# Patient Record
Sex: FEMALE | URBAN - METROPOLITAN AREA
[De-identification: names, ages, dates, MRNs, and addresses within clinical notes are randomized per-mention and may not be internally consistent; named-entity substitution may affect disease eponyms.]

---

## 2021-10-15 ENCOUNTER — HISTORICAL (OUTPATIENT)
Dept: ANESTHESIOLOGY | Facility: HOSPITAL | Age: 61
End: 2021-10-15

## 2021-10-15 LAB
ABS NEUT (OLG): 11.2 X10(3)/MCL (ref 1.5–6.9)
APTT PPP: 23.9 SEC (ref 23.4–34.9)
BASOPHILS # BLD AUTO: 0 X10(3)/MCL (ref 0–0.1)
BASOPHILS NFR BLD AUTO: 0 % (ref 0–1)
BUN SERPL-MCNC: 9 MG/DL (ref 9.8–20.1)
CALCIUM SERPL-MCNC: 9.5 MG/DL (ref 8.4–10.2)
CHLORIDE SERPL-SCNC: 103 MMOL/L (ref 98–107)
CO2 SERPL-SCNC: 26 MMOL/L (ref 23–31)
CREAT SERPL-MCNC: 0.83 MG/DL (ref 0.55–1.02)
CREAT/UREA NIT SERPL: 11
EOSINOPHIL # BLD AUTO: 0 X10(3)/MCL (ref 0–0.6)
EOSINOPHIL NFR BLD AUTO: 0 % (ref 0–5)
ERYTHROCYTE [DISTWIDTH] IN BLOOD BY AUTOMATED COUNT: 12.8 % (ref 11.5–17)
GLUCOSE SERPL-MCNC: 94 MG/DL (ref 82–115)
HCT VFR BLD AUTO: 41.2 % (ref 36–48)
HGB BLD-MCNC: 13.1 GM/DL (ref 12–16)
IMM GRANULOCYTES # BLD AUTO: 0.05 10*3/UL (ref 0–0.02)
IMM GRANULOCYTES NFR BLD AUTO: 0.4 % (ref 0–0.43)
INR PPP: 1 (ref 2–3)
LYMPHOCYTES # BLD AUTO: 0.5 X10(3)/MCL (ref 0.5–4.1)
LYMPHOCYTES NFR BLD AUTO: 4 % (ref 15–40)
MCH RBC QN AUTO: 29 PG (ref 27–34)
MCHC RBC AUTO-ENTMCNC: 32 GM/DL (ref 31–36)
MCV RBC AUTO: 92 FL (ref 80–99)
MONOCYTES # BLD AUTO: 0.6 X10(3)/MCL (ref 0–1.1)
MONOCYTES NFR BLD AUTO: 5 % (ref 4–12)
NEUTROPHILS # BLD AUTO: 11.2 X10(3)/MCL (ref 1.5–6.9)
NEUTROPHILS NFR BLD AUTO: 90 % (ref 43–75)
PLATELET # BLD AUTO: 246 X10(3)/MCL (ref 140–400)
PMV BLD AUTO: 11 FL (ref 6.8–10)
POTASSIUM SERPL-SCNC: 3.3 MMOL/L (ref 3.5–5.1)
PROTHROMBIN TIME: 13 SEC (ref 11.7–14.5)
RBC # BLD AUTO: 4.48 X10(6)/MCL (ref 4.2–5.4)
SARS-COV-2 AG RESP QL IA.RAPID: NOT DETECTED
SODIUM SERPL-SCNC: 140 MMOL/L (ref 136–145)
WBC # SPEC AUTO: 12.4 X10(3)/MCL (ref 4.5–11.5)

## 2022-04-30 NOTE — OP NOTE
PREOPERATIVE DIAGNOSIS:  Right Colles fracture.    POSTOPERATIVE DIAGNOSIS:  Right Colles fracture.    PROCEDURE:  Open reduction, internal fixation, right distal radius fracture.    IMPLANT TYPE:  Synthes volar plate.    INDICATIONS:  The patient is a 61-year-old female who had a severely displaced radius fracture, had numbness with tension on the median nerve.  She was consented and brought to surgery.  I saw her late yesterday afternoon.    DESCRIPTION OF PROCEDURE:  She was brought to the OR.  She was extremely anxious.  She had some tachycardia, blood pressure was a little high, so Anesthesia carefully brought that down.  A 12-lead EKG which Dr. Samaniego reviewed did not feel like it revealed any cardiac changes.  We gave her a block for safety versus general anesthesia.  Tourniquet was inflated.  Volar incision was made. The FCR tendon was retracted.  The flexor pollicis muscle belly was peeled off the pronator.  The pronator was taken down.  The fracture was reduced and secured with a volar plate with a compression screw and a locking screw in the shaft, and then distal locking __________.  The reduction was anatomic.  The wound was irrigated.  Permanents were taken.  The pronator was quite deficient, but I closed what I could.  Subcu was closed, subcuticular, and Exofin glue on the skin. Sterile dressing, volar splint.  No complications.        DILSHAD/WESLEY   DD: 10/15/2021 1424   DT: 10/15/2021 1435  Job # [id number]

## 2025-02-26 ENCOUNTER — HOSPITAL ENCOUNTER (INPATIENT)
Facility: HOSPITAL | Age: 65
LOS: 2 days | Discharge: HOME | DRG: 321 | End: 2025-02-28
Attending: STUDENT IN AN ORGANIZED HEALTH CARE EDUCATION/TRAINING PROGRAM | Admitting: STUDENT IN AN ORGANIZED HEALTH CARE EDUCATION/TRAINING PROGRAM
Payer: MEDICARE

## 2025-02-26 ENCOUNTER — HOSPITAL ENCOUNTER (OUTPATIENT)
Dept: RADIOLOGY | Facility: HOSPITAL | Age: 65
Discharge: HOME | End: 2025-02-26
Attending: FAMILY MEDICINE
Payer: MEDICARE

## 2025-02-26 ENCOUNTER — HOSPITAL ENCOUNTER (EMERGENCY)
Facility: HOSPITAL | Age: 65
Discharge: ANOTHER HOSPITAL | End: 2025-02-26
Attending: INTERNAL MEDICINE
Payer: MEDICARE

## 2025-02-26 VITALS
RESPIRATION RATE: 20 BRPM | TEMPERATURE: 99 F | OXYGEN SATURATION: 99 % | BODY MASS INDEX: 20.91 KG/M2 | HEIGHT: 63 IN | HEART RATE: 95 BPM | SYSTOLIC BLOOD PRESSURE: 138 MMHG | WEIGHT: 118 LBS | DIASTOLIC BLOOD PRESSURE: 99 MMHG

## 2025-02-26 DIAGNOSIS — R10.13 EPIGASTRIC PAIN: ICD-10-CM

## 2025-02-26 DIAGNOSIS — R07.9 CHEST PAIN: ICD-10-CM

## 2025-02-26 DIAGNOSIS — I25.10 CAD (CORONARY ARTERY DISEASE): ICD-10-CM

## 2025-02-26 DIAGNOSIS — R10.13 EPIGASTRIC ABDOMINAL PAIN: Primary | ICD-10-CM

## 2025-02-26 DIAGNOSIS — Z98.61 CAD S/P PERCUTANEOUS CORONARY ANGIOPLASTY: ICD-10-CM

## 2025-02-26 DIAGNOSIS — I21.4 NSTEMI (NON-ST ELEVATED MYOCARDIAL INFARCTION): ICD-10-CM

## 2025-02-26 DIAGNOSIS — R10.0 ACUTE ABDOMEN: ICD-10-CM

## 2025-02-26 DIAGNOSIS — I25.10 CAD S/P PERCUTANEOUS CORONARY ANGIOPLASTY: ICD-10-CM

## 2025-02-26 LAB
ALBUMIN SERPL-MCNC: 3.5 G/DL (ref 3.4–4.8)
ALBUMIN SERPL-MCNC: 4.7 G/DL (ref 3.4–4.8)
ALBUMIN/GLOB SERPL: 1.3 RATIO (ref 1.1–2)
ALBUMIN/GLOB SERPL: 1.6 RATIO (ref 1.1–2)
ALP SERPL-CCNC: 49 UNIT/L (ref 40–150)
ALP SERPL-CCNC: 60 UNIT/L (ref 40–150)
ALT SERPL-CCNC: 10 UNIT/L (ref 0–55)
ALT SERPL-CCNC: 12 UNIT/L (ref 0–55)
ANION GAP SERPL CALC-SCNC: 12 MEQ/L
ANION GAP SERPL CALC-SCNC: 9 MEQ/L
AST SERPL-CCNC: 43 UNIT/L (ref 5–34)
AST SERPL-CCNC: 56 UNIT/L (ref 5–34)
BACTERIA #/AREA URNS AUTO: NORMAL /HPF
BASOPHILS # BLD AUTO: 0.02 X10(3)/MCL
BASOPHILS # BLD AUTO: 0.02 X10(3)/MCL
BASOPHILS NFR BLD AUTO: 0.1 %
BASOPHILS NFR BLD AUTO: 0.2 %
BILIRUB SERPL-MCNC: 0.6 MG/DL
BILIRUB SERPL-MCNC: 0.7 MG/DL
BILIRUB UR QL STRIP.AUTO: NEGATIVE
BNP BLD-MCNC: 278.7 PG/ML
BUN SERPL-MCNC: 11.5 MG/DL (ref 9.8–20.1)
BUN SERPL-MCNC: 14 MG/DL (ref 9.8–20.1)
CALCIUM SERPL-MCNC: 10.5 MG/DL (ref 8.4–10.2)
CALCIUM SERPL-MCNC: 8.9 MG/DL (ref 8.4–10.2)
CHLORIDE SERPL-SCNC: 104 MMOL/L (ref 98–107)
CHLORIDE SERPL-SCNC: 105 MMOL/L (ref 98–107)
CLARITY UR: CLEAR
CO2 SERPL-SCNC: 22 MMOL/L (ref 23–31)
CO2 SERPL-SCNC: 23 MMOL/L (ref 23–31)
COLOR UR AUTO: YELLOW
CREAT SERPL-MCNC: 0.84 MG/DL (ref 0.55–1.02)
CREAT SERPL-MCNC: 0.89 MG/DL (ref 0.55–1.02)
CREAT/UREA NIT SERPL: 14
CREAT/UREA NIT SERPL: 16
EOSINOPHIL # BLD AUTO: 0 X10(3)/MCL (ref 0–0.9)
EOSINOPHIL # BLD AUTO: 0.01 X10(3)/MCL (ref 0–0.9)
EOSINOPHIL NFR BLD AUTO: 0 %
EOSINOPHIL NFR BLD AUTO: 0.1 %
ERYTHROCYTE [DISTWIDTH] IN BLOOD BY AUTOMATED COUNT: 13.3 % (ref 11.5–17)
ERYTHROCYTE [DISTWIDTH] IN BLOOD BY AUTOMATED COUNT: 13.3 % (ref 11.5–17)
GFR SERPLBLD CREATININE-BSD FMLA CKD-EPI: >60 ML/MIN/1.73/M2
GFR SERPLBLD CREATININE-BSD FMLA CKD-EPI: >60 ML/MIN/1.73/M2
GLOBULIN SER-MCNC: 2.2 GM/DL (ref 2.4–3.5)
GLOBULIN SER-MCNC: 3.5 GM/DL (ref 2.4–3.5)
GLUCOSE SERPL-MCNC: 104 MG/DL (ref 82–115)
GLUCOSE SERPL-MCNC: 122 MG/DL (ref 82–115)
GLUCOSE UR QL STRIP: NEGATIVE
HCT VFR BLD AUTO: 38.2 % (ref 37–47)
HCT VFR BLD AUTO: 46.6 % (ref 37–47)
HGB BLD-MCNC: 12.5 G/DL (ref 12–16)
HGB BLD-MCNC: 15.4 G/DL (ref 12–16)
HGB UR QL STRIP: ABNORMAL
IMM GRANULOCYTES # BLD AUTO: 0.04 X10(3)/MCL (ref 0–0.04)
IMM GRANULOCYTES # BLD AUTO: 0.06 X10(3)/MCL (ref 0–0.04)
IMM GRANULOCYTES NFR BLD AUTO: 0.4 %
IMM GRANULOCYTES NFR BLD AUTO: 0.4 %
KETONES UR QL STRIP: ABNORMAL
LEUKOCYTE ESTERASE UR QL STRIP: NEGATIVE
LIPASE SERPL-CCNC: 29 U/L
LYMPHOCYTES # BLD AUTO: 0.74 X10(3)/MCL (ref 0.6–4.6)
LYMPHOCYTES # BLD AUTO: 0.95 X10(3)/MCL (ref 0.6–4.6)
LYMPHOCYTES NFR BLD AUTO: 4.8 %
LYMPHOCYTES NFR BLD AUTO: 9.3 %
MCH RBC QN AUTO: 29.2 PG (ref 27–31)
MCH RBC QN AUTO: 29.6 PG (ref 27–31)
MCHC RBC AUTO-ENTMCNC: 32.7 G/DL (ref 33–36)
MCHC RBC AUTO-ENTMCNC: 33 G/DL (ref 33–36)
MCV RBC AUTO: 88.3 FL (ref 80–94)
MCV RBC AUTO: 90.3 FL (ref 80–94)
MONOCYTES # BLD AUTO: 0.57 X10(3)/MCL (ref 0.1–1.3)
MONOCYTES # BLD AUTO: 0.73 X10(3)/MCL (ref 0.1–1.3)
MONOCYTES NFR BLD AUTO: 4.7 %
MONOCYTES NFR BLD AUTO: 5.6 %
NEUTROPHILS # BLD AUTO: 13.94 X10(3)/MCL (ref 2.1–9.2)
NEUTROPHILS # BLD AUTO: 8.6 X10(3)/MCL (ref 2.1–9.2)
NEUTROPHILS NFR BLD AUTO: 84.4 %
NEUTROPHILS NFR BLD AUTO: 90 %
NITRITE UR QL STRIP: NEGATIVE
NRBC BLD AUTO-RTO: 0 %
NRBC BLD AUTO-RTO: 0 %
OHS QRS DURATION: 74 MS
OHS QTC CALCULATION: 387 MS
PH UR STRIP: 5 [PH]
PLATELET # BLD AUTO: 269 X10(3)/MCL (ref 130–400)
PLATELET # BLD AUTO: 326 X10(3)/MCL (ref 130–400)
PMV BLD AUTO: 11.3 FL (ref 7.4–10.4)
PMV BLD AUTO: 11.5 FL (ref 7.4–10.4)
POTASSIUM SERPL-SCNC: 3.4 MMOL/L (ref 3.5–5.1)
POTASSIUM SERPL-SCNC: 4 MMOL/L (ref 3.5–5.1)
PROT SERPL-MCNC: 5.7 GM/DL (ref 5.8–7.6)
PROT SERPL-MCNC: 8.2 GM/DL (ref 5.8–7.6)
PROT UR QL STRIP: NEGATIVE
RBC # BLD AUTO: 4.23 X10(6)/MCL (ref 4.2–5.4)
RBC # BLD AUTO: 5.28 X10(6)/MCL (ref 4.2–5.4)
RBC #/AREA URNS AUTO: NORMAL /HPF
SODIUM SERPL-SCNC: 137 MMOL/L (ref 136–145)
SODIUM SERPL-SCNC: 138 MMOL/L (ref 136–145)
SP GR UR STRIP.AUTO: <=1.005 (ref 1–1.03)
SQUAMOUS #/AREA URNS AUTO: NORMAL /HPF
TROPONIN I SERPL-MCNC: 8.37 NG/ML (ref 0–0.04)
TROPONIN I SERPL-MCNC: 9.41 NG/ML (ref 0–0.04)
UROBILINOGEN UR STRIP-ACNC: 0.2
WBC # BLD AUTO: 10.19 X10(3)/MCL (ref 4.5–11.5)
WBC # BLD AUTO: 15.49 X10(3)/MCL (ref 4.5–11.5)
WBC #/AREA URNS AUTO: NORMAL /HPF

## 2025-02-26 PROCEDURE — 63600175 PHARM REV CODE 636 W HCPCS: Performed by: STUDENT IN AN ORGANIZED HEALTH CARE EDUCATION/TRAINING PROGRAM

## 2025-02-26 PROCEDURE — 25000242 PHARM REV CODE 250 ALT 637 W/ HCPCS: Performed by: EMERGENCY MEDICINE

## 2025-02-26 PROCEDURE — 96376 TX/PRO/DX INJ SAME DRUG ADON: CPT

## 2025-02-26 PROCEDURE — 85025 COMPLETE CBC W/AUTO DIFF WBC: CPT | Performed by: STUDENT IN AN ORGANIZED HEALTH CARE EDUCATION/TRAINING PROGRAM

## 2025-02-26 PROCEDURE — 84484 ASSAY OF TROPONIN QUANT: CPT | Performed by: STUDENT IN AN ORGANIZED HEALTH CARE EDUCATION/TRAINING PROGRAM

## 2025-02-26 PROCEDURE — 11000001 HC ACUTE MED/SURG PRIVATE ROOM

## 2025-02-26 PROCEDURE — 93010 ELECTROCARDIOGRAM REPORT: CPT | Mod: 76,,, | Performed by: INTERNAL MEDICINE

## 2025-02-26 PROCEDURE — 93005 ELECTROCARDIOGRAM TRACING: CPT

## 2025-02-26 PROCEDURE — 80053 COMPREHEN METABOLIC PANEL: CPT | Performed by: STUDENT IN AN ORGANIZED HEALTH CARE EDUCATION/TRAINING PROGRAM

## 2025-02-26 PROCEDURE — 84484 ASSAY OF TROPONIN QUANT: CPT | Performed by: NURSE PRACTITIONER

## 2025-02-26 PROCEDURE — 21400001 HC TELEMETRY ROOM

## 2025-02-26 PROCEDURE — 83880 ASSAY OF NATRIURETIC PEPTIDE: CPT | Performed by: STUDENT IN AN ORGANIZED HEALTH CARE EDUCATION/TRAINING PROGRAM

## 2025-02-26 PROCEDURE — 96372 THER/PROPH/DIAG INJ SC/IM: CPT | Performed by: NURSE PRACTITIONER

## 2025-02-26 PROCEDURE — 83690 ASSAY OF LIPASE: CPT | Performed by: NURSE PRACTITIONER

## 2025-02-26 PROCEDURE — 80053 COMPREHEN METABOLIC PANEL: CPT | Performed by: NURSE PRACTITIONER

## 2025-02-26 PROCEDURE — 74176 CT ABD & PELVIS W/O CONTRAST: CPT | Mod: TC

## 2025-02-26 PROCEDURE — 81001 URINALYSIS AUTO W/SCOPE: CPT | Performed by: NURSE PRACTITIONER

## 2025-02-26 PROCEDURE — 96374 THER/PROPH/DIAG INJ IV PUSH: CPT

## 2025-02-26 PROCEDURE — 25500020 PHARM REV CODE 255: Performed by: NURSE PRACTITIONER

## 2025-02-26 PROCEDURE — 96361 HYDRATE IV INFUSION ADD-ON: CPT

## 2025-02-26 PROCEDURE — 25000003 PHARM REV CODE 250: Performed by: EMERGENCY MEDICINE

## 2025-02-26 PROCEDURE — 63600175 PHARM REV CODE 636 W HCPCS: Performed by: EMERGENCY MEDICINE

## 2025-02-26 PROCEDURE — 63600175 PHARM REV CODE 636 W HCPCS: Performed by: NURSE PRACTITIONER

## 2025-02-26 PROCEDURE — 25000003 PHARM REV CODE 250: Performed by: NURSE PRACTITIONER

## 2025-02-26 PROCEDURE — 99285 EMERGENCY DEPT VISIT HI MDM: CPT | Mod: 25

## 2025-02-26 PROCEDURE — 93010 ELECTROCARDIOGRAM REPORT: CPT | Mod: ,,, | Performed by: INTERNAL MEDICINE

## 2025-02-26 PROCEDURE — 96375 TX/PRO/DX INJ NEW DRUG ADDON: CPT

## 2025-02-26 PROCEDURE — 85025 COMPLETE CBC W/AUTO DIFF WBC: CPT | Performed by: NURSE PRACTITIONER

## 2025-02-26 RX ORDER — METOPROLOL TARTRATE 50 MG/1
50 TABLET ORAL
Status: COMPLETED | OUTPATIENT
Start: 2025-02-26 | End: 2025-02-26

## 2025-02-26 RX ORDER — MORPHINE SULFATE 4 MG/ML
2 INJECTION, SOLUTION INTRAMUSCULAR; INTRAVENOUS
Status: COMPLETED | OUTPATIENT
Start: 2025-02-26 | End: 2025-02-26

## 2025-02-26 RX ORDER — ONDANSETRON HYDROCHLORIDE 2 MG/ML
4 INJECTION, SOLUTION INTRAVENOUS EVERY 6 HOURS PRN
Status: DISCONTINUED | OUTPATIENT
Start: 2025-02-26 | End: 2025-02-27

## 2025-02-26 RX ORDER — ONDANSETRON HYDROCHLORIDE 2 MG/ML
4 INJECTION, SOLUTION INTRAVENOUS
Status: COMPLETED | OUTPATIENT
Start: 2025-02-26 | End: 2025-02-26

## 2025-02-26 RX ORDER — MORPHINE SULFATE 4 MG/ML
4 INJECTION, SOLUTION INTRAMUSCULAR; INTRAVENOUS
Refills: 0 | Status: DISCONTINUED | OUTPATIENT
Start: 2025-02-26 | End: 2025-02-26 | Stop reason: HOSPADM

## 2025-02-26 RX ORDER — NITROGLYCERIN 0.4 MG/1
0.4 TABLET SUBLINGUAL
Status: COMPLETED | OUTPATIENT
Start: 2025-02-26 | End: 2025-02-26

## 2025-02-26 RX ORDER — LABETALOL HYDROCHLORIDE 5 MG/ML
5 INJECTION, SOLUTION INTRAVENOUS
Status: COMPLETED | OUTPATIENT
Start: 2025-02-26 | End: 2025-02-26

## 2025-02-26 RX ORDER — NITROGLYCERIN 0.4 MG/1
0.4 TABLET SUBLINGUAL EVERY 5 MIN PRN
Status: DISCONTINUED | OUTPATIENT
Start: 2025-02-26 | End: 2025-02-28 | Stop reason: HOSPADM

## 2025-02-26 RX ORDER — IOPAMIDOL 755 MG/ML
100 INJECTION, SOLUTION INTRAVASCULAR
Status: COMPLETED | OUTPATIENT
Start: 2025-02-26 | End: 2025-02-26

## 2025-02-26 RX ORDER — ASPIRIN 325 MG
325 TABLET, DELAYED RELEASE (ENTERIC COATED) ORAL
Status: COMPLETED | OUTPATIENT
Start: 2025-02-26 | End: 2025-02-26

## 2025-02-26 RX ORDER — ENOXAPARIN SODIUM 100 MG/ML
60 INJECTION SUBCUTANEOUS
Status: COMPLETED | OUTPATIENT
Start: 2025-02-26 | End: 2025-02-26

## 2025-02-26 RX ADMIN — SODIUM CHLORIDE, POTASSIUM CHLORIDE, SODIUM LACTATE AND CALCIUM CHLORIDE 1000 ML: 600; 310; 30; 20 INJECTION, SOLUTION INTRAVENOUS at 08:02

## 2025-02-26 RX ADMIN — METOPROLOL TARTRATE 50 MG: 50 TABLET, FILM COATED ORAL at 06:02

## 2025-02-26 RX ADMIN — NITROGLYCERIN 1 INCH: 20 OINTMENT TOPICAL at 05:02

## 2025-02-26 RX ADMIN — ONDANSETRON 4 MG: 2 INJECTION INTRAMUSCULAR; INTRAVENOUS at 05:02

## 2025-02-26 RX ADMIN — MORPHINE SULFATE 2 MG: 4 INJECTION, SOLUTION INTRAMUSCULAR; INTRAVENOUS at 05:02

## 2025-02-26 RX ADMIN — IOPAMIDOL 100 ML: 755 INJECTION, SOLUTION INTRAVENOUS at 06:02

## 2025-02-26 RX ADMIN — LABETALOL HYDROCHLORIDE 5 MG: 5 INJECTION INTRAVENOUS at 06:02

## 2025-02-26 RX ADMIN — SODIUM CHLORIDE 1000 ML: 9 INJECTION, SOLUTION INTRAVENOUS at 05:02

## 2025-02-26 RX ADMIN — ASPIRIN 325 MG: 325 TABLET, COATED ORAL at 05:02

## 2025-02-26 RX ADMIN — ONDANSETRON 4 MG: 2 INJECTION INTRAMUSCULAR; INTRAVENOUS at 09:02

## 2025-02-26 RX ADMIN — ENOXAPARIN SODIUM 60 MG: 60 INJECTION SUBCUTANEOUS at 06:02

## 2025-02-26 RX ADMIN — NITROGLYCERIN 0.4 MG: 0.4 TABLET, ORALLY DISINTEGRATING SUBLINGUAL at 05:02

## 2025-02-26 NOTE — Clinical Note
The catheter was inserted into the left ventricle. Hemodynamics were performed.  and Pullback was recorded.  An angiography was performed of the LV. The angiography was performed via power injection.   40%.

## 2025-02-26 NOTE — ED PROVIDER NOTES
Encounter Date: 2025       History     Chief Complaint   Patient presents with    Abdominal Pain     Epigastric pain, n/v, and dizziness starting this morning. Had CT scan and lab work today.     See MDM    The history is provided by the patient. No  was used.     Review of patient's allergies indicates:   Allergen Reactions    Meperidine Shortness Of Breath    Sulfa (sulfonamide antibiotics) Shortness Of Breath     Past Medical History:   Diagnosis Date    Known health problems: none      Past Surgical History:   Procedure Laterality Date     SECTION       SECTION      WRIST SURGERY       No family history on file.  Social History[1]  Review of Systems   Constitutional:  Negative for fever.   Respiratory:  Negative for cough and shortness of breath.    Cardiovascular:  Negative for chest pain.   Gastrointestinal:  Positive for abdominal pain, nausea and vomiting.   Genitourinary:  Negative for difficulty urinating and dysuria.   Musculoskeletal:  Negative for gait problem.   Skin:  Negative for color change.   Neurological:  Positive for dizziness. Negative for speech difficulty and headaches.   Psychiatric/Behavioral:  Negative for hallucinations and suicidal ideas.    All other systems reviewed and are negative.      Physical Exam     Initial Vitals [25 1644]   BP Pulse Resp Temp SpO2   (!) 151/112 (!) 121 18 98.9 °F (37.2 °C) 97 %      MAP       --         Physical Exam    Nursing note and vitals reviewed.  Constitutional: She appears well-developed and well-nourished.   HENT:   Head: Normocephalic.   Eyes: EOM are normal.   Neck:   Normal range of motion.  Cardiovascular:  Normal rate, regular rhythm, normal heart sounds and intact distal pulses.           Pulmonary/Chest: Breath sounds normal. No respiratory distress.   Abdominal: Abdomen is soft. Bowel sounds are normal. There is no abdominal tenderness.   Musculoskeletal:         General: Normal range of motion.       Cervical back: Normal range of motion.     Neurological: She is alert and oriented to person, place, and time. She has normal strength.   Skin: Skin is warm and dry.   Psychiatric: She has a normal mood and affect. Her behavior is normal. Judgment and thought content normal.         ED Course   Procedures  Labs Reviewed   COMPREHENSIVE METABOLIC PANEL - Abnormal       Result Value    Sodium 138      Potassium 4.0      Chloride 104      CO2 22 (*)     Glucose 122 (*)     Blood Urea Nitrogen 14.0      Creatinine 0.89      Calcium 10.5 (*)     Protein Total 8.2 (*)     Albumin 4.7      Globulin 3.5      Albumin/Globulin Ratio 1.3      Bilirubin Total 0.7      ALP 60      ALT 12      AST 56 (*)     eGFR >60      Anion Gap 12.0      BUN/Creatinine Ratio 16     TROPONIN I - Abnormal    Troponin-I 9.409 (*)    CBC WITH DIFFERENTIAL - Abnormal    WBC 15.49 (*)     RBC 5.28      Hgb 15.4      Hct 46.6      MCV 88.3      MCH 29.2      MCHC 33.0      RDW 13.3      Platelet 326      MPV 11.3 (*)     Neut % 90.0      Lymph % 4.8      Mono % 4.7      Eos % 0.0      Basophil % 0.1      Imm Grans % 0.4      Neut # 13.94 (*)     Lymph # 0.74      Mono # 0.73      Eos # 0.00      Baso # 0.02      Imm Gran # 0.06 (*)     NRBC% 0.0     LIPASE - Normal    Lipase Level 29     CBC W/ AUTO DIFFERENTIAL    Narrative:     The following orders were created for panel order CBC auto differential.  Procedure                               Abnormality         Status                     ---------                               -----------         ------                     CBC with Differential[[id number]]        Abnormal            Final result                 Please view results for these tests on the individual orders.   URINALYSIS, REFLEX TO URINE CULTURE     EKG Readings: (Independently Interpreted)   Rhythm: Sinus Tachycardia. Heart Rate: 126. Ectopy: No Ectopy. Conduction: Normal. ST Segments: Normal ST Segments. T Waves: Normal. Axis:  Normal. Clinical Impression: Sinus Tachycardia       Imaging Results              X-Ray Chest 1 View (In process)                      CTA Chest Non-Coronary (PE Studies) (Final result)  Result time 02/26/25 18:23:26      Final result by Kaveh Webster MD (02/26/25 18:23:26)                   Impression:      1. No convincing evidence of pulmonary thromboembolism.  2. There are nonspecific subcentimeter bilateral hilar short axis lymph nodes.  Please correlate with clinical exam.      Electronically signed by: Kaveh Webster MD  Date:    02/26/2025  Time:    18:23               Narrative:    EXAMINATION:  CTA CHEST NON CORONARY (PE STUDIES)    CLINICAL HISTORY:  Pulmonary embolism (PE) suspected, high prob; epigastric pain, dizziness,    TECHNIQUE:  Helical imaging of the pulmonary arteries was performed.  Soft tissue and lung algorithms were applied.  Axial, sagittal and coronal images were generated.  3D MIP images were performed. Automated exposure control software was utilized to limit radiation exposure to the patient.  Total DLP for this study was 134 mgycm.    COMPARISON:  No prior pertinent study is currently available.    FINDINGS:  There is no central pulmonary arterial filling defect to suggest pulmonary thromboembolism.    Cardiac chamber sizes are within normal limits.  There is no pleural or pericardial fluid.  There are no enlarged intrathoracic lymph nodes.  There are subcentimeter bilateral hilar short axis lymph nodes.    There is no dense lobar consolidation or suspicious focal pulmonary parenchymal finding.    There is no acute finding in the included abdomen. There is no acute finding in the included body wall. There is no acute finding in the included base of the neck.                                       Medications   morphine injection 4 mg (4 mg Intravenous Not Given 2/26/25 1700)   enoxaparin injection 60 mg (has no administration in time range)   labetaloL injection 5 mg (has no  administration in time range)   metoprolol tartrate (LOPRESSOR) tablet 50 mg (has no administration in time range)   sodium chloride 0.9% bolus 1,000 mL 1,000 mL (0 mLs Intravenous Stopped 2/26/25 1836)   ondansetron injection 4 mg (4 mg Intravenous Given 2/26/25 1704)   aspirin EC tablet 325 mg (325 mg Oral Given 2/26/25 1735)   nitroGLYCERIN 2% TD oint ointment 1 inch (1 inch Topical (Top) Given 2/26/25 1747)   ondansetron injection 4 mg (4 mg Intravenous Given 2/26/25 1748)   nitroGLYCERIN SL tablet 0.4 mg (0.4 mg Sublingual Given 2/26/25 1750)   iopamidoL (ISOVUE-370) injection 100 mL (100 mLs Intravenous Given 2/26/25 1807)   morphine injection 2 mg (2 mg Intravenous Given 2/26/25 1752)     Medical Decision Making  Historian:  Patient.  Patient is a White 64 y.o. female that presents with epigastric pain  that has been present today. Associated symptoms nausea and dizziness. Surrounding information is nothing. Exacerbated by nothing. Relieved by nothing. Patient treatment prior to arrival none. Risk factors include none. Other history pertaining to this complaint nothing.   Assessment:  See physical exam.  DD:  MI, acute coronary syndrome, cholecystitis, pancreatitis, gastritis  ED Course: History was obtained.  Physical was performed.  Patient appears to have a non STEMI.  I will transfer her to [facility]. Medical or surgical consults:  [facility] Emergency room physician. Social determinants that affect healthcare:  None.     Patient was given metoprolol, Trandate, morphine, nitroglycerin, Zofran, aspirin, Lovenox.       Amount and/or Complexity of Data Reviewed  Labs: ordered.     Details: Elevated troponin  Radiology: ordered.     Details: CT shows no PE  Discussion of management or test interpretation with external provider(s): Discussed this case with Dr. Hector, emergency room physician at [facility], he accepts the admission    Risk  OTC drugs.  Prescription drug  management.         APC / Resident Notes:   I have personally had face to face with the patient.  All charts, labs, and imaging studies were reviewed.  I agree with this PA/NPs findings, exam and plan. I approve the management plan and take responsibility of the patient management.                                 Clinical Impression:  Final diagnoses:  [R10.13] Epigastric pain  [I21.4] NSTEMI (non-ST elevated myocardial infarction)          ED Disposition Condition    Transfer to Another Facility Stable                  David Aguirre, ASHLEIGH  02/26/25 1848         [1]   Social History  Tobacco Use    Smoking status: Never    Smokeless tobacco: Never   Substance Use Topics    Alcohol use: Yes     Comment: occ    Drug use: Not Currently        Chava Odonnell DO  02/27/25 2270

## 2025-02-26 NOTE — Clinical Note
Spoke to  regarding medication being sent in for patient see E-advice    The radial band was applied to the right radial artery. 10 cc's of air were inserted into the closure device.

## 2025-02-26 NOTE — Clinical Note
The catheter was removed from the, was repositioned into the and insertion attempt was made into the ostium   left main. The catheter was unable to engage the area..

## 2025-02-27 LAB
ALBUMIN SERPL-MCNC: 4.1 G/DL (ref 3.4–4.8)
ALBUMIN/GLOB SERPL: 1.7 RATIO (ref 1.1–2)
ALP SERPL-CCNC: 55 UNIT/L (ref 40–150)
ALT SERPL-CCNC: 13 UNIT/L (ref 0–55)
ANION GAP SERPL CALC-SCNC: 11 MEQ/L
APICAL FOUR CHAMBER EJECTION FRACTION: 47 %
APICAL TWO CHAMBER EJECTION FRACTION: 36 %
AST SERPL-CCNC: 49 UNIT/L (ref 5–34)
AV INDEX (PROSTH): 0.66
AV MEAN GRADIENT: 4 MMHG
AV PEAK GRADIENT: 8 MMHG
AV VELOCITY RATIO: 0.64
BASOPHILS # BLD AUTO: 0.02 X10(3)/MCL
BASOPHILS NFR BLD AUTO: 0.3 %
BILIRUB SERPL-MCNC: 0.7 MG/DL
BSA FOR ECHO PROCEDURE: 1.54 M2
BUN SERPL-MCNC: 11.5 MG/DL (ref 9.8–20.1)
CALCIUM SERPL-MCNC: 9.5 MG/DL (ref 8.4–10.2)
CHLORIDE SERPL-SCNC: 106 MMOL/L (ref 98–107)
CHOLEST SERPL-MCNC: 199 MG/DL
CHOLEST/HDLC SERPL: 3 {RATIO} (ref 0–5)
CO2 SERPL-SCNC: 23 MMOL/L (ref 23–31)
CREAT SERPL-MCNC: 0.95 MG/DL (ref 0.55–1.02)
CREAT/UREA NIT SERPL: 12
CV ECHO LV RWT: 0.4 CM
DOP CALC AO PEAK VEL: 1.4 M/S
DOP CALC AO VTI: 21.5 CM
DOP CALC LVOT PEAK VEL: 0.9 M/S
DOP CALCLVOT PEAK VEL VTI: 14.1 CM
E WAVE DECELERATION TIME: 169 MSEC
E/A RATIO: 0.92
E/E' RATIO: 11 M/S
ECHO LV POSTERIOR WALL: 0.9 CM (ref 0.6–1.1)
EOSINOPHIL # BLD AUTO: 0.01 X10(3)/MCL (ref 0–0.9)
EOSINOPHIL NFR BLD AUTO: 0.1 %
ERYTHROCYTE [DISTWIDTH] IN BLOOD BY AUTOMATED COUNT: 13.6 % (ref 11.5–17)
EST. AVERAGE GLUCOSE BLD GHB EST-MCNC: 111.2 MG/DL
FRACTIONAL SHORTENING: 46.7 % (ref 28–44)
GFR SERPLBLD CREATININE-BSD FMLA CKD-EPI: >60 ML/MIN/1.73/M2
GLOBULIN SER-MCNC: 2.4 GM/DL (ref 2.4–3.5)
GLUCOSE SERPL-MCNC: 95 MG/DL (ref 82–115)
HBA1C MFR BLD: 5.5 %
HCT VFR BLD AUTO: 41.2 % (ref 37–47)
HDLC SERPL-MCNC: 67 MG/DL (ref 35–60)
HGB BLD-MCNC: 13.4 G/DL (ref 12–16)
IMM GRANULOCYTES # BLD AUTO: 0.03 X10(3)/MCL (ref 0–0.04)
IMM GRANULOCYTES NFR BLD AUTO: 0.4 %
INTERVENTRICULAR SEPTUM: 0.9 CM (ref 0.6–1.1)
LDLC SERPL CALC-MCNC: 106 MG/DL (ref 50–140)
LEFT ATRIUM AREA SYSTOLIC (APICAL 2 CHAMBER): 15.3 CM2
LEFT ATRIUM AREA SYSTOLIC (APICAL 4 CHAMBER): 16.2 CM2
LEFT ATRIUM SIZE: 3.4 CM
LEFT ATRIUM VOLUME INDEX MOD: 27 ML/M2
LEFT ATRIUM VOLUME MOD: 42 ML
LEFT INTERNAL DIMENSION IN SYSTOLE: 2.4 CM (ref 2.1–4)
LEFT VENTRICLE DIASTOLIC VOLUME INDEX: 60.65 ML/M2
LEFT VENTRICLE DIASTOLIC VOLUME: 94 ML
LEFT VENTRICLE END DIASTOLIC VOLUME APICAL 2 CHAMBER: 86.8 ML
LEFT VENTRICLE END DIASTOLIC VOLUME APICAL 4 CHAMBER: 85 ML
LEFT VENTRICLE END SYSTOLIC VOLUME APICAL 2 CHAMBER: 38.2 ML
LEFT VENTRICLE END SYSTOLIC VOLUME APICAL 4 CHAMBER: 44.1 ML
LEFT VENTRICLE MASS INDEX: 85.7 G/M2
LEFT VENTRICLE SYSTOLIC VOLUME INDEX: 12.3 ML/M2
LEFT VENTRICLE SYSTOLIC VOLUME: 19 ML
LEFT VENTRICULAR INTERNAL DIMENSION IN DIASTOLE: 4.5 CM (ref 3.5–6)
LEFT VENTRICULAR MASS: 132.8 G
LV LATERAL E/E' RATIO: 10 M/S
LV SEPTAL E/E' RATIO: 11.7 M/S
LVED V (TEICH): 93.9 ML
LVES V (TEICH): 19.1 ML
LVOT MG: 2 MMHG
LVOT MV: 0.6 CM/S
LYMPHOCYTES # BLD AUTO: 1.69 X10(3)/MCL (ref 0.6–4.6)
LYMPHOCYTES NFR BLD AUTO: 22.3 %
MAGNESIUM SERPL-MCNC: 1.8 MG/DL (ref 1.6–2.6)
MCH RBC QN AUTO: 29.4 PG (ref 27–31)
MCHC RBC AUTO-ENTMCNC: 32.5 G/DL (ref 33–36)
MCV RBC AUTO: 90.4 FL (ref 80–94)
MONOCYTES # BLD AUTO: 0.56 X10(3)/MCL (ref 0.1–1.3)
MONOCYTES NFR BLD AUTO: 7.4 %
MR PISA EROA: 0.5 CM2
MV PEAK A VEL: 0.76 M/S
MV PEAK E VEL: 0.7 M/S
NEUTROPHILS # BLD AUTO: 5.26 X10(3)/MCL (ref 2.1–9.2)
NEUTROPHILS NFR BLD AUTO: 69.5 %
NRBC BLD AUTO-RTO: 0 %
OHS LV EJECTION FRACTION SIMPSONS BIPLANE MOD: 42 %
OHS QRS DURATION: 70 MS
OHS QRS DURATION: 76 MS
OHS QRS DURATION: 76 MS
OHS QTC CALCULATION: 417 MS
OHS QTC CALCULATION: 458 MS
OHS QTC CALCULATION: 515 MS
PISA MRMAX VEL: 5.78 M/S
PISA RADIUS: 1 CM
PISA TR MAX VEL: 3.1 M/S
PISA VN NYQUIST MS: 0.46 M/S
PISA VN NYQUIST: 0.46 M/S
PLATELET # BLD AUTO: 286 X10(3)/MCL (ref 130–400)
PMV BLD AUTO: 12.2 FL (ref 7.4–10.4)
POTASSIUM SERPL-SCNC: 3.9 MMOL/L (ref 3.5–5.1)
PROT SERPL-MCNC: 6.5 GM/DL (ref 5.8–7.6)
RBC # BLD AUTO: 4.56 X10(6)/MCL (ref 4.2–5.4)
SODIUM SERPL-SCNC: 140 MMOL/L (ref 136–145)
TDI LATERAL: 0.07 M/S
TDI SEPTAL: 0.06 M/S
TDI: 0.07 M/S
TRICUSPID ANNULAR PLANE SYSTOLIC EXCURSION: 1.68 CM
TRIGL SERPL-MCNC: 131 MG/DL (ref 37–140)
TROPONIN I SERPL-MCNC: 6.14 NG/ML (ref 0–0.04)
VLDLC SERPL CALC-MCNC: 26 MG/DL
WBC # BLD AUTO: 7.57 X10(3)/MCL (ref 4.5–11.5)
Z-SCORE OF LEFT VENTRICULAR DIMENSION IN END DIASTOLE: 0.03
Z-SCORE OF LEFT VENTRICULAR DIMENSION IN END SYSTOLE: -1.17

## 2025-02-27 PROCEDURE — 93458 L HRT ARTERY/VENTRICLE ANGIO: CPT | Mod: 59 | Performed by: INTERNAL MEDICINE

## 2025-02-27 PROCEDURE — C1725 CATH, TRANSLUMIN NON-LASER: HCPCS | Performed by: INTERNAL MEDICINE

## 2025-02-27 PROCEDURE — B240ZZ3 ULTRASONOGRAPHY OF SINGLE CORONARY ARTERY, INTRAVASCULAR: ICD-10-PCS | Performed by: INTERNAL MEDICINE

## 2025-02-27 PROCEDURE — 36415 COLL VENOUS BLD VENIPUNCTURE: CPT

## 2025-02-27 PROCEDURE — 85025 COMPLETE CBC W/AUTO DIFF WBC: CPT

## 2025-02-27 PROCEDURE — 92978 ENDOLUMINL IVUS OCT C 1ST: CPT | Performed by: INTERNAL MEDICINE

## 2025-02-27 PROCEDURE — 25000003 PHARM REV CODE 250: Performed by: INTERNAL MEDICINE

## 2025-02-27 PROCEDURE — C9600 PERC DRUG-EL COR STENT SING: HCPCS | Mod: LD | Performed by: INTERNAL MEDICINE

## 2025-02-27 PROCEDURE — 21400001 HC TELEMETRY ROOM

## 2025-02-27 PROCEDURE — C1769 GUIDE WIRE: HCPCS | Performed by: INTERNAL MEDICINE

## 2025-02-27 PROCEDURE — 83036 HEMOGLOBIN GLYCOSYLATED A1C: CPT | Performed by: NURSE PRACTITIONER

## 2025-02-27 PROCEDURE — 4A023N7 MEASUREMENT OF CARDIAC SAMPLING AND PRESSURE, LEFT HEART, PERCUTANEOUS APPROACH: ICD-10-PCS | Performed by: INTERNAL MEDICINE

## 2025-02-27 PROCEDURE — 25500020 PHARM REV CODE 255: Performed by: INTERNAL MEDICINE

## 2025-02-27 PROCEDURE — 93010 ELECTROCARDIOGRAM REPORT: CPT | Mod: ,,, | Performed by: INTERNAL MEDICINE

## 2025-02-27 PROCEDURE — 63600175 PHARM REV CODE 636 W HCPCS: Performed by: NURSE PRACTITIONER

## 2025-02-27 PROCEDURE — 83735 ASSAY OF MAGNESIUM: CPT

## 2025-02-27 PROCEDURE — 99152 MOD SED SAME PHYS/QHP 5/>YRS: CPT | Performed by: INTERNAL MEDICINE

## 2025-02-27 PROCEDURE — C1887 CATHETER, GUIDING: HCPCS | Performed by: INTERNAL MEDICINE

## 2025-02-27 PROCEDURE — B2111ZZ FLUOROSCOPY OF MULTIPLE CORONARY ARTERIES USING LOW OSMOLAR CONTRAST: ICD-10-PCS | Performed by: INTERNAL MEDICINE

## 2025-02-27 PROCEDURE — 63600175 PHARM REV CODE 636 W HCPCS: Performed by: INTERNAL MEDICINE

## 2025-02-27 PROCEDURE — C1753 CATH, INTRAVAS ULTRASOUND: HCPCS | Performed by: INTERNAL MEDICINE

## 2025-02-27 PROCEDURE — 25000003 PHARM REV CODE 250: Performed by: NURSE PRACTITIONER

## 2025-02-27 PROCEDURE — 99153 MOD SED SAME PHYS/QHP EA: CPT | Performed by: INTERNAL MEDICINE

## 2025-02-27 PROCEDURE — 36415 COLL VENOUS BLD VENIPUNCTURE: CPT | Performed by: NURSE PRACTITIONER

## 2025-02-27 PROCEDURE — 80061 LIPID PANEL: CPT | Performed by: NURSE PRACTITIONER

## 2025-02-27 PROCEDURE — 93005 ELECTROCARDIOGRAM TRACING: CPT

## 2025-02-27 PROCEDURE — 027034Z DILATION OF CORONARY ARTERY, ONE ARTERY WITH DRUG-ELUTING INTRALUMINAL DEVICE, PERCUTANEOUS APPROACH: ICD-10-PCS | Performed by: INTERNAL MEDICINE

## 2025-02-27 PROCEDURE — 27201423 OPTIME MED/SURG SUP & DEVICES STERILE SUPPLY: Performed by: INTERNAL MEDICINE

## 2025-02-27 PROCEDURE — C1874 STENT, COATED/COV W/DEL SYS: HCPCS | Performed by: INTERNAL MEDICINE

## 2025-02-27 PROCEDURE — 80053 COMPREHEN METABOLIC PANEL: CPT

## 2025-02-27 PROCEDURE — B2151ZZ FLUOROSCOPY OF LEFT HEART USING LOW OSMOLAR CONTRAST: ICD-10-PCS | Performed by: INTERNAL MEDICINE

## 2025-02-27 PROCEDURE — 25000003 PHARM REV CODE 250

## 2025-02-27 PROCEDURE — C1894 INTRO/SHEATH, NON-LASER: HCPCS | Performed by: INTERNAL MEDICINE

## 2025-02-27 PROCEDURE — 84484 ASSAY OF TROPONIN QUANT: CPT | Performed by: STUDENT IN AN ORGANIZED HEALTH CARE EDUCATION/TRAINING PROGRAM

## 2025-02-27 DEVICE — STENT ONYXNG35034UX ONYX 3.50X34RX
Type: IMPLANTABLE DEVICE | Site: CORONARY | Status: FUNCTIONAL
Brand: ONYX FRONTIER™

## 2025-02-27 RX ORDER — MAGNESIUM SULFATE HEPTAHYDRATE 40 MG/ML
2 INJECTION, SOLUTION INTRAVENOUS ONCE
Status: COMPLETED | OUTPATIENT
Start: 2025-02-27 | End: 2025-02-27

## 2025-02-27 RX ORDER — VERAPAMIL HYDROCHLORIDE 2.5 MG/ML
INJECTION, SOLUTION INTRAVENOUS
Status: DISCONTINUED | OUTPATIENT
Start: 2025-02-27 | End: 2025-02-27 | Stop reason: HOSPADM

## 2025-02-27 RX ORDER — ATORVASTATIN CALCIUM 40 MG/1
80 TABLET, FILM COATED ORAL DAILY
Status: DISCONTINUED | OUTPATIENT
Start: 2025-02-27 | End: 2025-02-28 | Stop reason: HOSPADM

## 2025-02-27 RX ORDER — NITROGLYCERIN 20 MG/100ML
INJECTION INTRAVENOUS
Status: DISCONTINUED | OUTPATIENT
Start: 2025-02-27 | End: 2025-02-27 | Stop reason: HOSPADM

## 2025-02-27 RX ORDER — SODIUM CHLORIDE 9 MG/ML
INJECTION, SOLUTION INTRAVENOUS CONTINUOUS
Status: ACTIVE | OUTPATIENT
Start: 2025-02-27 | End: 2025-02-28

## 2025-02-27 RX ORDER — ENOXAPARIN SODIUM 100 MG/ML
40 INJECTION SUBCUTANEOUS EVERY 24 HOURS
Status: DISCONTINUED | OUTPATIENT
Start: 2025-02-27 | End: 2025-02-28 | Stop reason: HOSPADM

## 2025-02-27 RX ORDER — MIDAZOLAM HYDROCHLORIDE 1 MG/ML
INJECTION INTRAMUSCULAR; INTRAVENOUS
Status: DISCONTINUED | OUTPATIENT
Start: 2025-02-27 | End: 2025-02-27 | Stop reason: HOSPADM

## 2025-02-27 RX ORDER — GLUCAGON 1 MG
1 KIT INJECTION
Status: DISCONTINUED | OUTPATIENT
Start: 2025-02-27 | End: 2025-02-28 | Stop reason: HOSPADM

## 2025-02-27 RX ORDER — HEPARIN SODIUM 1000 [USP'U]/ML
INJECTION, SOLUTION INTRAVENOUS; SUBCUTANEOUS
Status: DISCONTINUED | OUTPATIENT
Start: 2025-02-27 | End: 2025-02-27 | Stop reason: HOSPADM

## 2025-02-27 RX ORDER — ASPIRIN 81 MG/1
81 TABLET ORAL DAILY
Status: DISCONTINUED | OUTPATIENT
Start: 2025-02-27 | End: 2025-02-28 | Stop reason: HOSPADM

## 2025-02-27 RX ORDER — LIDOCAINE HYDROCHLORIDE 10 MG/ML
INJECTION, SOLUTION EPIDURAL; INFILTRATION; INTRACAUDAL; PERINEURAL
Status: DISCONTINUED | OUTPATIENT
Start: 2025-02-27 | End: 2025-02-27 | Stop reason: HOSPADM

## 2025-02-27 RX ORDER — IBUPROFEN 200 MG
16 TABLET ORAL
Status: DISCONTINUED | OUTPATIENT
Start: 2025-02-27 | End: 2025-02-28 | Stop reason: HOSPADM

## 2025-02-27 RX ORDER — SACUBITRIL AND VALSARTAN 24; 26 MG/1; MG/1
1 TABLET, FILM COATED ORAL 2 TIMES DAILY
Status: DISCONTINUED | OUTPATIENT
Start: 2025-02-27 | End: 2025-02-28

## 2025-02-27 RX ORDER — POLYETHYLENE GLYCOL 3350 17 G/17G
17 POWDER, FOR SOLUTION ORAL 2 TIMES DAILY PRN
Status: DISCONTINUED | OUTPATIENT
Start: 2025-02-27 | End: 2025-02-28 | Stop reason: HOSPADM

## 2025-02-27 RX ORDER — SODIUM CHLORIDE 0.9 % (FLUSH) 0.9 %
10 SYRINGE (ML) INJECTION
Status: DISCONTINUED | OUTPATIENT
Start: 2025-02-27 | End: 2025-02-28 | Stop reason: HOSPADM

## 2025-02-27 RX ORDER — IBUPROFEN 200 MG
24 TABLET ORAL
Status: DISCONTINUED | OUTPATIENT
Start: 2025-02-27 | End: 2025-02-28 | Stop reason: HOSPADM

## 2025-02-27 RX ORDER — ALUMINUM HYDROXIDE, MAGNESIUM HYDROXIDE, AND SIMETHICONE 1200; 120; 1200 MG/30ML; MG/30ML; MG/30ML
30 SUSPENSION ORAL 4 TIMES DAILY PRN
Status: DISCONTINUED | OUTPATIENT
Start: 2025-02-27 | End: 2025-02-28 | Stop reason: HOSPADM

## 2025-02-27 RX ORDER — DIPHENHYDRAMINE HYDROCHLORIDE 50 MG/ML
INJECTION INTRAMUSCULAR; INTRAVENOUS
Status: DISCONTINUED | OUTPATIENT
Start: 2025-02-27 | End: 2025-02-27 | Stop reason: HOSPADM

## 2025-02-27 RX ORDER — ALUMINUM HYDROXIDE, MAGNESIUM HYDROXIDE, AND SIMETHICONE 1200; 120; 1200 MG/30ML; MG/30ML; MG/30ML
SUSPENSION ORAL
Status: DISCONTINUED | OUTPATIENT
Start: 2025-02-27 | End: 2025-02-27 | Stop reason: HOSPADM

## 2025-02-27 RX ORDER — ONDANSETRON HYDROCHLORIDE 2 MG/ML
4 INJECTION, SOLUTION INTRAVENOUS EVERY 4 HOURS PRN
Status: DISCONTINUED | OUTPATIENT
Start: 2025-02-27 | End: 2025-02-28 | Stop reason: HOSPADM

## 2025-02-27 RX ORDER — ACETAMINOPHEN 500 MG
1000 TABLET ORAL EVERY 6 HOURS PRN
Status: DISCONTINUED | OUTPATIENT
Start: 2025-02-27 | End: 2025-02-28 | Stop reason: HOSPADM

## 2025-02-27 RX ORDER — PANTOPRAZOLE SODIUM 40 MG/1
40 TABLET, DELAYED RELEASE ORAL DAILY
Status: DISCONTINUED | OUTPATIENT
Start: 2025-02-27 | End: 2025-02-28 | Stop reason: HOSPADM

## 2025-02-27 RX ORDER — METOPROLOL SUCCINATE 25 MG/1
25 TABLET, EXTENDED RELEASE ORAL DAILY
Status: DISCONTINUED | OUTPATIENT
Start: 2025-02-27 | End: 2025-02-28

## 2025-02-27 RX ORDER — TALC
6 POWDER (GRAM) TOPICAL NIGHTLY PRN
Status: DISCONTINUED | OUTPATIENT
Start: 2025-02-27 | End: 2025-02-28 | Stop reason: HOSPADM

## 2025-02-27 RX ORDER — ONDANSETRON HYDROCHLORIDE 2 MG/ML
INJECTION, SOLUTION INTRAVENOUS
Status: DISCONTINUED | OUTPATIENT
Start: 2025-02-27 | End: 2025-02-27 | Stop reason: HOSPADM

## 2025-02-27 RX ORDER — IOPAMIDOL 755 MG/ML
INJECTION, SOLUTION INTRAVASCULAR
Status: DISCONTINUED | OUTPATIENT
Start: 2025-02-27 | End: 2025-02-27 | Stop reason: HOSPADM

## 2025-02-27 RX ORDER — BISACODYL 10 MG/1
10 SUPPOSITORY RECTAL DAILY PRN
Status: DISCONTINUED | OUTPATIENT
Start: 2025-02-27 | End: 2025-02-28 | Stop reason: HOSPADM

## 2025-02-27 RX ORDER — HEPARIN SODIUM,PORCINE/D5W 25000/250
0-40 INTRAVENOUS SOLUTION INTRAVENOUS CONTINUOUS
Status: DISCONTINUED | OUTPATIENT
Start: 2025-02-27 | End: 2025-02-27

## 2025-02-27 RX ORDER — FENTANYL CITRATE 50 UG/ML
INJECTION, SOLUTION INTRAMUSCULAR; INTRAVENOUS
Status: DISCONTINUED | OUTPATIENT
Start: 2025-02-27 | End: 2025-02-27 | Stop reason: HOSPADM

## 2025-02-27 RX ADMIN — PANTOPRAZOLE SODIUM 40 MG: 40 TABLET, DELAYED RELEASE ORAL at 12:02

## 2025-02-27 RX ADMIN — SACUBITRIL AND VALSARTAN 1 TABLET: 24; 26 TABLET, FILM COATED ORAL at 08:02

## 2025-02-27 RX ADMIN — METOPROLOL SUCCINATE 25 MG: 25 TABLET, EXTENDED RELEASE ORAL at 12:02

## 2025-02-27 RX ADMIN — ATORVASTATIN CALCIUM 80 MG: 40 TABLET, FILM COATED ORAL at 12:02

## 2025-02-27 RX ADMIN — ENOXAPARIN SODIUM 40 MG: 40 INJECTION SUBCUTANEOUS at 07:02

## 2025-02-27 RX ADMIN — ASPIRIN 81 MG: 81 TABLET, COATED ORAL at 08:02

## 2025-02-27 RX ADMIN — SODIUM CHLORIDE: 9 INJECTION, SOLUTION INTRAVENOUS at 12:02

## 2025-02-27 RX ADMIN — ACETAMINOPHEN 1000 MG: 500 TABLET ORAL at 02:02

## 2025-02-27 RX ADMIN — MAGNESIUM SULFATE HEPTAHYDRATE 2 G: 40 INJECTION, SOLUTION INTRAVENOUS at 12:02

## 2025-02-27 NOTE — CONSULTS
Inpatient consult to Cardiology  Consult performed by: Tana Phillips FNP  Consult ordered by: Tana Phillips FNP  Reason for consult: NSTEMI        [facility]    Cardiology  Consult Note    Patient Name: Hien Roy  MRN: [id number]  Admission Date: 2025  Hospital Length of Stay: 1 days  Code Status: Full Code   Attending Provider: Reyes, Thairy G, DO   Consulting Provider: ASHLEIGH Morrow  Primary Care Physician: Deana Jaimes FNP  Principal Problem:<principal problem not specified>    Patient information was obtained from patient, past medical records, ER records, and primary team.     Subjective:   Chief Complaint/Reason for Consult: NSTEMI    HPI:   Ms. Roy is a 64 year old female, unknown to [facility], who was transferred to [facility] from outside facility for cardiology services. Patient presented to Outside hospital with abdominal pain. Also reported nausea while drinking coffee that morning (25). CT Angiogram chest revealed no acute findings. Troponin elevated with peak value of 9.4. . CT Abdomen revealed Diverticulosis coli & Mild soft tissue fullness at the cervical vaginal region. CXR revealed Nonspecific central interstitial opacities. EKG revealed SR with non specific T Wave Abnormalities. Patient loaded with Aspirin and Given FD Lovenox on 25 afternoon. [facility] consulted for cardiac evaluation and workup related to the NSTEMI.    PMH: No Significant Past Medical History  PSH: , Wrist Surgery  Family History: None  Social History: Tobacco- Negative, Alcohol- Occasional Use, Substance Abuse- Negative    Previous Cardiac Diagnostics:   CT Angiogram Chest (25):  FINDINGS:  There is no central pulmonary arterial filling defect to suggest pulmonary thromboembolism.  Cardiac chamber sizes are within normal limits.  There is no pleural or pericardial fluid.  There are no enlarged intrathoracic lymph nodes.  There are subcentimeter bilateral  hilar short axis lymph nodes.  There is no dense lobar consolidation or suspicious focal pulmonary parenchymal finding.  There is no acute finding in the included abdomen. There is no acute finding in the included body wall. There is no acute finding in the included base of the neck.  Impression:  No convincing evidence of pulmonary thromboembolism.  There are nonspecific subcentimeter bilateral hilar short axis lymph nodes.    Review of patient's allergies indicates:   Allergen Reactions    Meperidine Shortness Of Breath    Sulfa (sulfonamide antibiotics) Shortness Of Breath     Current Facility-Administered Medications on File Prior to Encounter   Medication    [COMPLETED] aspirin EC tablet 325 mg    [COMPLETED] enoxaparin injection 60 mg    [COMPLETED] iopamidoL (ISOVUE-370) injection 100 mL    [COMPLETED] labetaloL injection 5 mg    [COMPLETED] metoprolol tartrate (LOPRESSOR) tablet 50 mg    [COMPLETED] morphine injection 2 mg    [COMPLETED] nitroGLYCERIN 2% TD oint ointment 1 inch    [COMPLETED] nitroGLYCERIN SL tablet 0.4 mg    [COMPLETED] ondansetron injection 4 mg    [COMPLETED] ondansetron injection 4 mg    [COMPLETED] sodium chloride 0.9% bolus 1,000 mL 1,000 mL    [DISCONTINUED] morphine injection 4 mg     No current outpatient medications on file prior to encounter.     Review of Systems   Respiratory:  Negative for chest tightness and shortness of breath.    Cardiovascular:  Negative for chest pain.   Gastrointestinal:         Epigastric Discomfort.    All other systems reviewed and are negative.    Objective:     Vital Signs (Most Recent):  Temp: 98.9 °F (37.2 °C) (02/27/25 0714)  Pulse: 84 (02/27/25 0714)  Resp: 18 (02/27/25 0423)  BP: 120/83 (02/27/25 0714)  SpO2: 96 % (02/27/25 0714) Vital Signs (24h Range):  Temp:  [98 °F (36.7 °C)-99.1 °F (37.3 °C)] 98.9 °F (37.2 °C)  Pulse:  [] 84  Resp:  [10-30] 18  SpO2:  [94 %-99 %] 96 %  BP: ()/() 120/83   Weight: 53.5 kg (118 lb)  Body mass  "index is 20.9 kg/m².  SpO2: 96 %       Intake/Output Summary (Last 24 hours) at 2/27/2025 0720  Last data filed at 2/27/2025 0200  Gross per 24 hour   Intake 20 ml   Output --   Net 20 ml     Lines/Drains/Airways       Peripheral Intravenous Line  Duration                  Peripheral IV - Single Lumen 02/26/25 1704 20 G Right Antecubital <1 day         Peripheral IV - Single Lumen 02/26/25 1845 20 G Anterior;Distal;Right Forearm <1 day                  Significant Labs:   Chemistries:   Recent Labs   Lab 02/26/25  1428 02/26/25  1705 02/26/25 2043    138 137   K 3.7 4.0 3.4*    104 105   CO2 23 22* 23   BUN 15.0 14.0 11.5   CREATININE 0.81 0.89 0.84   CALCIUM 11.1* 10.5* 8.9   BILITOT  --  0.7 0.6   ALKPHOS  --  60 49   ALT  --  12 10   AST  --  56* 43*   GLUCOSE 138* 122* 104   TROPONINI  --  9.409* 8.370*        CBC/Anemia Labs: Coags:    Recent Labs   Lab 02/26/25  1705 02/26/25 2043 02/27/25  0637   WBC 15.49* 10.19 7.57   HGB 15.4 12.5 13.4   HCT 46.6 38.2 41.2    269 286   MCV 88.3 90.3 90.4   RDW 13.3 13.3 13.6    No results for input(s): "PT", "INR", "APTT" in the last 168 hours.     Significant Imaging:  Imaging Results    None       EKG:       Telemetry:  SR    Physical Exam  Vitals and nursing note reviewed.   Constitutional:       Appearance: Normal appearance.   HENT:      Head: Normocephalic.      Mouth/Throat:      Mouth: Mucous membranes are moist.      Pharynx: Oropharynx is clear.   Cardiovascular:      Rate and Rhythm: Normal rate and regular rhythm.      Heart sounds: Normal heart sounds.   Pulmonary:      Effort: Pulmonary effort is normal. No respiratory distress.      Breath sounds: Normal breath sounds. No wheezing or rales.   Abdominal:      Palpations: Abdomen is soft.   Musculoskeletal:         General: Normal range of motion.      Cervical back: Neck supple.   Skin:     General: Skin is warm and dry.   Neurological:      General: No focal deficit present.      Mental " Status: She is alert and oriented to person, place, and time. Mental status is at baseline.   Psychiatric:         Mood and Affect: Mood normal.         Behavior: Behavior normal.       Home Medications:   Medications Ordered Prior to Encounter[1]  Current Schedule Inpatient Medications:        Assessment:   NSTEMI (Unspecified)    - Epigastric Pain    - CT Angiogram Chest (2.26.25): no central pulmonary arterial filling defect to suggest pulmonary thromboembolism.    - Aspirin Load given on 2.26.25, FD Lovenox Last Dose  2.26.25 1845 Hours  Diverticulosis Coli (CT Imaging)  Leukocytosis (Resolved)  Anxiety  No History of GI Bleed    Plan:   Transition to Aspirin 81 Mg Daily  Start Heparin Infusion Per Protocol Re: NSTEMI (Last Dose Lovenox  2.26.25 1845 Hours)  Keep NPO  Check Echo  Nitro PRN CP  Plan [facility] +/- PCI Today  Risks, benefits, alternatives of the left heart catheterization plus or minus intervention discussed in detail with the patient. Specific risks include but are not limited to stroke, death, heart attack, need for emergent surgery, bleeding, and renal failure. Patient voiced understanding and wishes to proceed.      Thank you for your consult.     Tana Phillips, DAMON  Cardiology  [facility]     Pt seen and examined by me, Inna Turner MD. I have reviewed the NP's note, assessment and plan. I have personally interviewed and examined the patient at bedside and agree with the findings. Medical decision making listed above were done under my guidance.     Physical exam:  NAD  Cardiovascular system: regular rhythm, no murmur.  Lungs: CTAB.  Abd: S/ST/ND  Extremities: No leg edema.      Assessment and Plan:   [facility] today for NSTEMI with ongoing angina  No acute ECG changes         [1]   Current Facility-Administered Medications on File Prior to Encounter   Medication Dose Route Frequency Provider Last Rate Last Admin    [COMPLETED] aspirin EC tablet 325 mg  325 mg Oral ED 1 Time Timothy  David G, FNP   325 mg at 02/26/25 1735    [COMPLETED] enoxaparin injection 60 mg  60 mg Subcutaneous ED 1 Time David Aguirre, FNP   60 mg at 02/26/25 1845    [COMPLETED] iopamidoL (ISOVUE-370) injection 100 mL  100 mL Intravenous ONCE PRN David Aguirre, FNP   100 mL at 02/26/25 1807    [COMPLETED] labetaloL injection 5 mg  5 mg Intravenous ED 1 Time David Aguirre, FNP   5 mg at 02/26/25 1845    [COMPLETED] metoprolol tartrate (LOPRESSOR) tablet 50 mg  50 mg Oral ED 1 Time LarosaDavid valdes, FNP   50 mg at 02/26/25 1845    [COMPLETED] morphine injection 2 mg  2 mg Intravenous ED 1 Time Larosakeisha David G, FNP   2 mg at 02/26/25 1752    [COMPLETED] nitroGLYCERIN 2% TD oint ointment 1 inch  1 inch Topical (Top) ED 1 Time Chava Odonnell, DO   1 inch at 02/26/25 1747    [COMPLETED] nitroGLYCERIN SL tablet 0.4 mg  0.4 mg Sublingual ED 1 Time Chava Odonnell, DO   0.4 mg at 02/26/25 1750    [COMPLETED] ondansetron injection 4 mg  4 mg Intravenous ED 1 Time JanisDavid valdes, FNP   4 mg at 02/26/25 1704    [COMPLETED] ondansetron injection 4 mg  4 mg Intravenous ED 1 Time Chava Odonnell, DO   4 mg at 02/26/25 1748    [COMPLETED] sodium chloride 0.9% bolus 1,000 mL 1,000 mL  1,000 mL Intravenous ED 1 Time David Aguirre, FNP   Stopped at 02/26/25 1836    [DISCONTINUED] morphine injection 4 mg  4 mg Intravenous ED 1 Time David Aguirre, FNP         No current outpatient medications on file prior to encounter.

## 2025-02-27 NOTE — PLAN OF CARE
Problem: Adult Inpatient Plan of Care  Goal: Plan of Care Review  Outcome: Progressing  Flowsheets (Taken 2/26/2025 2352)  Plan of Care Reviewed With: patient  Goal: Patient-Specific Goal (Individualized)  Outcome: Progressing  Goal: Absence of Hospital-Acquired Illness or Injury  Outcome: Progressing  Intervention: Identify and Manage Fall Risk  Flowsheets (Taken 2/26/2025 2352)  Safety Promotion/Fall Prevention:   assistive device/personal item within reach   Fall Risk signage in place   medications reviewed   side rails raised x 3  Intervention: Prevent Skin Injury  Flowsheets (Taken 2/26/2025 2352)  Body Position: position changed independently  Skin Protection: transparent dressing maintained  Device Skin Pressure Protection: positioning supports utilized  Intervention: Prevent and Manage VTE (Venous Thromboembolism) Risk  Flowsheets (Taken 2/26/2025 2352)  VTE Prevention/Management:   bleeding risk assessed   ambulation promoted   bleeding precautions maintained  Intervention: Prevent Infection  Flowsheets (Taken 2/26/2025 2352)  Infection Prevention:   rest/sleep promoted   hand hygiene promoted   environmental surveillance performed  Goal: Optimal Comfort and Wellbeing  Outcome: Progressing  Intervention: Monitor Pain and Promote Comfort  Flowsheets (Taken 2/26/2025 2352)  Pain Management Interventions:   care clustered   relaxation techniques promoted   quiet environment facilitated  Intervention: Provide Person-Centered Care  Flowsheets (Taken 2/26/2025 2352)  Trust Relationship/Rapport:   care explained   thoughts/feelings acknowledged   reassurance provided   emotional support provided  Goal: Readiness for Transition of Care  Outcome: Progressing

## 2025-02-27 NOTE — PLAN OF CARE
02/27/25 1203   Discharge Assessment   Assessment Type Discharge Planning Assessment   Confirmed/corrected address, phone number and insurance Yes   Confirmed Demographics Correct on Facesheet   Source of Information family   If unable to respond/provide information was family/caregiver contacted? Yes  (at bedside, pt in procedure)   Contact Name/Number Jayant Roy (Spouse)  [phone] (   When was your last doctors appointment? 02/26/25   Communicated JOANA with patient/caregiver Date not available/Unable to determine   Reason For Admission Epigastric abd pain   People in Home child(shasta), adult;grandchild(shasta);spouse   Do you expect to return to your current living situation? Yes   Do you have help at home or someone to help you manage your care at home? Yes   Who are your caregiver(s) and their phone number(s)? KirillJayant (Spouse)  [phone]   Prior to hospitilization cognitive status: Unable to Assess   Current cognitive status: Unable to Assess   Walking or Climbing Stairs Difficulty no   Dressing/Bathing Difficulty no   Equipment Currently Used at Home none   Readmission within 30 days? No   Patient currently being followed by outpatient case management? No   Do you currently have service(s) that help you manage your care at home? No   Do you have prescription coverage? Yes   Coverage mcr   Who is going to help you get home at discharge? KirillJayant (Spouse)  [phone]   How do you get to doctors appointments? car, drives self   Are you on dialysis? No   Do you take coumadin? No   Discharge Plan A Home with family   Discharge Plan B Home with family   DME Needed Upon Discharge  none   Discharge Plan discussed with: Adult children;Spouse/sig other   Name(s) and Number(s) KirillJayant (Spouse)  [phone]   Transition of Care Barriers None   Physical Activity   On average, how many days per week do you engage in moderate to strenuous exercise (like a brisk walk)? 0 days   On average, how  many minutes do you engage in exercise at this level? 0 min   Financial Resource Strain   How hard is it for you to pay for the very basics like food, housing, medical care, and heating? Not very   Housing Stability   In the last 12 months, was there a time when you were not able to pay the mortgage or rent on time? N   At any time in the past 12 months, were you homeless or living in a shelter (including now)? N   Transportation Needs   Has the lack of transportation kept you from medical appointments, meetings, work or from getting things needed for daily living? No   Food Insecurity   Within the past 12 months, you worried that your food would run out before you got the money to buy more. Never true   Within the past 12 months, the food you bought just didn't last and you didn't have money to get more. Never true   Stress   Do you feel stress - tense, restless, nervous, or anxious, or unable to sleep at night because your mind is troubled all the time - these days? Pt Unable   Social Isolation   How often do you feel lonely or isolated from those around you?  Patient unable to answer   Alcohol Use   Q1: How often do you have a drink containing alcohol? Monthly or l   Utilities   In the past 12 months has the electric, gas, oil, or water company threatened to shut off services in your home? No   Health Literacy   How often do you need to have someone help you when you read instructions, pamphlets, or other written material from your doctor or pharmacy? Rarely   OTHER   Name(s) of People in Home Jayant Roy (Spouse)  [phone] /Daughter Leonela

## 2025-02-27 NOTE — H&P
[facility]  Hospital Medicine History & Physical Examination       Patient Name: Hien Roy  MRN: [id number]  Patient Class: IP- Inpatient   Admission Date: 2025   Admitting Physician: LUZ ELENA Service   Length of Stay: 1  Attending Physician: Daron Wylie MD  Primary Care Provider: Deana Jaimes FNP  Face-to-Face encounter date: 2025  Code Status:FUll  Chief Complaint: Transfer (Arrives [facility] unit [facility] tx [facility] NSTEMI)              HISTORY OF PRESENT ILLNESS:     64-year-old woman with no known significant past medical history initially seen at an outside facility for complaints of epigastric/lower chest abdominal pain associated with nausea.  Workup at outside hospital ruled out PE but patient was seen to have elevated troponin with BNP of 278.  C diff abdomen showed diverticulosis.  She was given aspirin, nitro, full-dose Lovenox and transferred to [facility] for cardiology evaluation.      At presentation she was afebrile, hemodynamically stable.  Lab work showed no major electrolyte abnormalities.  Troponin trended down but remains elevated.  HDL 67, total cholesterol was 199.    Patient is scheduled for angiogram today      PAST MEDICAL HISTORY:     Past Medical History:   Diagnosis Date    Known health problems: none        PAST SURGICAL HISTORY:     Past Surgical History:   Procedure Laterality Date     SECTION       SECTION      WRIST SURGERY         ALLERGIES:   Meperidine and Sulfa (sulfonamide antibiotics)    FAMILY HISTORY:   Reviewed and negative    SOCIAL HISTORY:     Social History     Tobacco Use    Smoking status: Never    Smokeless tobacco: Never   Substance Use Topics    Alcohol use: Yes     Comment: occ        HOME MEDICATIONS:     Prior to Admission medications    Not on File       REVIEW OF SYSTEMS:   Except as documented, all other systems reviewed and negative     PHYSICAL EXAM:     VITAL SIGNS: 24 HRS MIN &  MAX LAST   Temp  Min: 98 °F (36.7 °C)  Max: 99.1 °F (37.3 °C) 98.9 °F (37.2 °C)   BP  Min: 98/72  Max: 162/119 108/74   Pulse  Min: 72  Max: 137  75   Resp  Min: 10  Max: 30 18   SpO2  Min: 92 %  Max: 99 % (!) 92 %     General appearance: Well-developed, well-nourished female in no apparent distress.  HENT: Atraumatic head. Moist mucous membranes of oral cavity.  Eyes: Normal extraocular movements.   Neck: Supple.   Lungs: Clear to auscultation bilaterally. No wheezing present.   Heart: Regular rate and rhythm. S1 and S2 present with no murmurs/gallop/rub. No pedal edema. No JVD present.   Abdomen: Soft, non-distended, non-tender. No rebound tenderness/guarding. Bowel sounds are normal.   Extremities: No cyanosis, clubbing, or edema.  Skin: No Rash.   Neuro: Motor and sensory exams grossly intact. Good tone. Muscle strength 5/5 in all 4 extremities  Psych/mental status: Appropriate mood and affect. Responds appropriately to questions.     LABS AND IMAGING:     Recent Labs   Lab 02/26/25  1705 02/26/25 2043 02/27/25  0637   WBC 15.49* 10.19 7.57   RBC 5.28 4.23 4.56   HGB 15.4 12.5 13.4   HCT 46.6 38.2 41.2   MCV 88.3 90.3 90.4   MCH 29.2 29.6 29.4   MCHC 33.0 32.7* 32.5*   RDW 13.3 13.3 13.6    269 286   MPV 11.3* 11.5* 12.2*       Recent Labs   Lab 02/26/25  1705 02/26/25  2043 02/27/25  0637    137 140   K 4.0 3.4* 3.9    105 106   CO2 22* 23 23   BUN 14.0 11.5 11.5   CREATININE 0.89 0.84 0.95   CALCIUM 10.5* 8.9 9.5   MG  --   --  1.80   ALBUMIN 4.7 3.5 4.1   ALKPHOS 60 49 55   ALT 12 10 13   AST 56* 43* 49*   BILITOT 0.7 0.6 0.7       Microbiology Results (last 7 days)       ** No results found for the last 168 hours. **             Cardiac catheterization    Proximal to mid LAD with 80 % noncalcified stenosis treated with IVUS   TIEN using a 3.5 x 34 marcela stent post dilated with a 4.0 x 6 NC.  The   moderate-sized diagonal was placed in stent alf with KAYE 3 flow at the   completion of the  procedure.    Remaining coronaries demonstrated patent anatomy    The ejection fraction was 40% with apical ballooning suggestive of   takotsubo.  The EDP was 17 mmHg.    Right radial approach    Estimated blood loss was less than 10 cc.  Contrast delivery 200 cc.    The procedure log was documented by No documenter listed and verified by   Inna Turner MD.    Date: 2/27/2025  Time: 10:21 AM    Procedure:   Selective coronary angiography  Left ventriculography  Left heart catheterization  IVUS guided TIEN of proximal to mid LAD    Indication:    Non STEMI with angina    Consent: The patient was brought to the cardiac catheterization lab. Was   instructed and explained about the risk, benefit and alternatives of the   procedure included but not limited to sudden cardiac death, myocardial   infarction, bleeding, vascular injury, renal failure, stroke, contrast   allergy, risk of conscious sedation and need for emergent bypass surgery.    the patient was agreeable to proceed.  Signed the consent form.  time-out was completed verifying correct patient, procedure, site,   positioning, and special equipment if applicable.    Access:  The patient was prepped using the usual sterile fashion.    Right radial artery  was accessed with micropuncture technique, ultrasound   guidance.  Sheath size:6F     Kali test:  Verified with pulse oximetry deemed to be adequate for radial   artery procedure.    Diagnostic catheters :  Clem JL 3.5 and EBU 3.0 guide    Coronary findings:  Dominance: right   Left main:  Patent vessel that bifurcates into a LAD and circumflex   system.  Left anterior descending artery:  Large type 2 vessel with diffuse   proximal to mid segment stenosis up to 80% by IVUS.  1st diagonal is a   moderate-sized vessel that originates proximal to the stenosis.  Circumflex artery:  Patent nondominant vessel that gives rise to a large   obtuse marginal-1.  The AV groove artery arises before the obtuse marginal    and terminates in a small terminal OM-2  Right coronary artery:  Patent dominant vessel terminates in a PDA and a   moderate-sized JHONATHAN    Left ventriculography:  The ejection fraction was 40% with apical ballooning suggestive of   takotsubo. The EDP was 17 mmHg.     Intervention: Proximal to mid LAD 80% stenosis reduced to 0% following   IVUS guided TIEN using a 3.5 x 34 marcela post dilated proximally with a 4 x 6   NC.  The moderate-sized diagonal was placed in stent long-term with KAYE 3 flow   throughout the vessel at the completion of the procedure.  -an EBU 3.0 was used to cannulate the left coronary system and a  50   wire was advanced into the distal LAD  -a Prowater wire was advanced into the moderate-sized diagonal  -IVUS was performed  -primary stenting using a 3.5 x 34 marcela TIEN followed by IVUS.  The   diagonal was placed in stent long-term with no compromise to flow  -the Prowater wire was removed and the proximal stent was post dilated   using a 4 x 6 NC    Access Closure :    At the end of the procedure the sheath was removed. A TR band applied to   the right radial artery . Excellent hemostasis achieved.    Impression/plan:  Emergent left heart catheterization for non STEMI with   persistent angina.  Proximal to mid LAD with diffuse disease up to 80% by   IVUS treated with TIEN.  The moderate-sized diagonal was placed in stent   long-term  -patient loaded with Aggrastat single bolus and Brilinta  -continue DA PT for 1 year  -started on atorvastatin 80 mg daily  -GD MT      ASSESSMENT & PLAN:     NSTEMI-type 1 versus type 2   Epigastric/lower chest pain at admission-?  Angina  Incidental diverticulosis coli seen on CT  Chronic severe anxiety    Plan:   -scheduled for angiogram today.  Continue heparin drip.  Continue telemetry and electrolyte monitoring.  TTE pending  -we will use anxiolytics as needed.  Review home meds after reconciliation    SCDs     Critical care diagnosis:  NSTEMI, Chest pain requiring  heparin GTT  Critical care time: 50 minutes  __________________________________________________________________________  INPATIENT LIST OF MEDICATIONS   Current Medications[1]      Scheduled Meds:   aspirin  81 mg Oral Daily    atorvastatin  80 mg Oral Daily    heparin (PORCINE)  60 Units/kg Intravenous Once    magnesium sulfate 2 g IVPB  2 g Intravenous Once    nitroGLYCERIN 2% TD oint  0.5 inch Topical (Top) Q6H    ticagrelor  90 mg Oral BID     Continuous Infusions:   0.9% NaCl   Intravenous Continuous        heparin (porcine) in D5W  0-40 Units/kg/hr Intravenous Continuous         PRN Meds:.  Current Facility-Administered Medications:     acetaminophen, 1,000 mg, Oral, Q6H PRN    aluminum-magnesium hydroxide-simethicone, 30 mL, Oral, QID PRN    aluminum-magnesium hydroxide-simethicone, , , PRN    bisacodyL, 10 mg, Rectal, Daily PRN    dextrose 50%, 12.5 g, Intravenous, PRN    dextrose 50%, 25 g, Intravenous, PRN    diphenhydrAMINE, , , PRN    fentaNYL, , , PRN    glucagon (human recombinant), 1 mg, Intramuscular, PRN    glucose, 16 g, Oral, PRN    glucose, 24 g, Oral, PRN    heparin (porcine), , , PRN    heparin (PORCINE), 60 Units/kg, Intravenous, PRN    heparin (PORCINE), 30 Units/kg, Intravenous, PRN    iopamidoL, , , PRN    LIDOcaine (PF) 10 mg/ml (1%), , , PRN    melatonin, 6 mg, Oral, Nightly PRN    midazolam, , , PRN    nitroGLYCERIN in dextrose 5%, , , PRN    nitroGLYCERIN, 0.4 mg, Sublingual, Q5 Min PRN    ondansetron, 4 mg, Intravenous, Q4H PRN    ondansetron, , , PRN    polyethylene glycol, 17 g, Oral, BID PRN    sodium chloride 0.9%, 10 mL, Intravenous, PRN    ticagrelor, , , PRN    tirofiban-0.9% sodium chloride 12.5 mg/250ml, , , PRN    verapamiL, , , PRN      Daron Wylie MD   02/27/2025     Screening for Social Drivers for health:  Patient screened for food insecurity, housing instability, transportation needs, utility difficulties, and interpersonal safety (select all that apply as  identified as concern)  []Housing or Food  []Transportation Needs  []Utility Difficulties  []Interpersonal safety  [x]None       [1]   Current Facility-Administered Medications:     0.9% NaCl infusion, , Intravenous, Continuous, Inna Turner MD    acetaminophen tablet 1,000 mg, 1,000 mg, Oral, Q6H PRN, Lori Maravilla, CHRISTINAP, 1,000 mg at 02/27/25 0204    aluminum-magnesium hydroxide-simethicone 200-200-20 mg/5 mL suspension 30 mL, 30 mL, Oral, QID PRN, Lori Maravilla, CHRISTINAP    aluminum-magnesium hydroxide-simethicone 200-200-20 mg/5 mL suspension, , , PRN, Inna Turner MD, 30 mL at 02/27/25 1014    aspirin EC tablet 81 mg, 81 mg, Oral, Daily, Tana Phillips T, FNP, 81 mg at 02/27/25 0855    atorvastatin tablet 80 mg, 80 mg, Oral, Daily, Inna Turner MD    bisacodyL suppository 10 mg, 10 mg, Rectal, Daily PRN, Lori Maravilla, CHRISTINAP    dextrose 50% injection 12.5 g, 12.5 g, Intravenous, PRN, Lori Maravilla, CHRISTINAP    dextrose 50% injection 25 g, 25 g, Intravenous, PRN, Emma Maravillasa, FNP    diphenhydrAMINE injection, , , PRN, Inna Turner MD, 50 mg at 02/27/25 0929    fentaNYL injection, , , PRN, Inna Turner MD, 50 mcg at 02/27/25 0958    glucagon (human recombinant) injection 1 mg, 1 mg, Intramuscular, PRN, Lori Maravilla, CHRISTINAP    glucose chewable tablet 16 g, 16 g, Oral, PRNTaiwo Alyssa, CHRISTINAP    glucose chewable tablet 24 g, 24 g, Oral, PRN, Emma Maravillasa, FNP    heparin (porcine) injection, , , PRN, Inna Turner MD, 2,500 Units at 02/27/25 0944    heparin 25,000 units in dextrose 5% (100 units/ml) IV bolus from bag LOW INTENSITY nomogram - LAF, 60 Units/kg, Intravenous, Once, Alan, Rein T, FNP    heparin 25,000 units in dextrose 5% (100 units/ml) IV bolus from bag LOW INTENSITY nomogram - LAF, 60 Units/kg, Intravenous, PRN, Alan, Rein T, FNP    heparin 25,000 units in dextrose 5% (100 units/ml) IV bolus from bag LOW INTENSITY nomogram - LAF, 30 Units/kg, Intravenous, PRN,  Tana Phillips, ASHLEIGH    heparin 25,000 units in dextrose 5% 250 mL (100 units/mL) infusion LOW INTENSITY nomogram - LAF, 0-40 Units/kg/hr, Intravenous, Continuous, Tana Phillips, ASHLEIGH    iopamidoL (ISOVUE-370) injection, , , PRN, Inna Turner MD, 200 mL at 02/27/25 1009    LIDOcaine (PF) 10 mg/ml (1%) injection, , , PRN, Inna Turner MD, 5 mL at 02/27/25 0927    magnesium sulfate 2g in water 50mL IVPB (premix), 2 g, Intravenous, Once, Tana Phillips FNP    melatonin tablet 6 mg, 6 mg, Oral, Nightly PRN, Lori Maravilla FNP    midazolam (VERSED) 1 mg/mL injection, , , PRN, Inna Turner MD, 1 mg at 02/27/25 0958    nitroGLYCERIN 2% TD oint ointment 0.5 inch, 0.5 inch, Topical (Top), Q6H, Tana Phillips, ASHLEIGH    nitroGLYCERIN in dextrose 5% 200 mcg/mL IVP, , , PRN, Inna Turner MD, 100 mcg at 02/27/25 0942    nitroGLYCERIN SL tablet 0.4 mg, 0.4 mg, Sublingual, Q5 Min PRN, Srini Solis MD    ondansetron injection 4 mg, 4 mg, Intravenous, Q4H PRN, Lori Maravilla FNP    ondansetron injection, , , PRN, Inna Turner MD, 4 mg at 02/27/25 0926    polyethylene glycol packet 17 g, 17 g, Oral, BID PRN, Lori Maravilla FNP    sodium chloride 0.9% flush 10 mL, 10 mL, Intravenous, PRN, Lori Maravilla FNP    ticagrelor tablet 90 mg, 90 mg, Oral, BID, Inna Turner MD    ticagrelor tablet, , , PRN, Inna Turner MD, 180 mg at 02/27/25 1014    tirofiban 12.5 mg in NS 12.5 MG/ 250 mL bolus from bag, , , PRN, Inna Turner MD, 1,337.5 mcg at 02/27/25 0952    verapamiL injection, , , PRN, Inna Turner MD, 2.5 mg at 02/27/25 0997

## 2025-02-27 NOTE — ED PROVIDER NOTES
Encounter Date: 2025    SCRIBE #1 NOTE: I, Milagros Garcia, am scribing for, and in the presence of,  Srini Solis MD. I have scribed the following portions of the note - Other sections scribed: HPI, ROS, PE.       History     Chief Complaint   Patient presents with    Transfer     Arrives [facility] unit 49 tx [facility] NSTEMI     Patient is a 64-year-old female with no significant past medical history presenting to the ED transferred from an outlying facility for cardiology services. The pt presented to the outside facility with c/o epigastric abdominal pain with associated nausea onset this morning around 0800 while drinking coffee. There, she had a CTA chest performed that showed no acute findings; however, lab work showed a troponin of 9.409. She was given 1 mg/kg of Lovenox and transferred for cardiology services. EMS gave the pt 325 mg of aspirin, sublingual nitroglycerin, multiple rounds of Lopresor, 2 mg of morphine, and 4 mg of Zofran while en route. The pt states her pain is now improved but not resolved. She denies any numbness or tingling.    The history is provided by the patient and medical records. No  was used.     Review of patient's allergies indicates:   Allergen Reactions    Meperidine Shortness Of Breath    Sulfa (sulfonamide antibiotics) Shortness Of Breath     Past Medical History:   Diagnosis Date    Known health problems: none      Past Surgical History:   Procedure Laterality Date     SECTION       SECTION      WRIST SURGERY       No family history on file.  Social History[1]  Review of Systems    Physical Exam     Initial Vitals [25 1946]   BP Pulse Resp Temp SpO2   126/87 96 18 99.1 °F (37.3 °C) 96 %      MAP       --         Physical Exam    ED Course   Procedures  Labs Reviewed   CBC W/ AUTO DIFFERENTIAL    Narrative:     The following orders were created for panel order CBC auto differential.  Procedure                                Abnormality         Status                     ---------                               -----------         ------                     CBC with Differential[[id number]]                                                        Please view results for these tests on the individual orders.   COMPREHENSIVE METABOLIC PANEL   TROPONIN I   B-TYPE NATRIURETIC PEPTIDE   CBC WITH DIFFERENTIAL          Imaging Results    None          Medications   lactated ringers bolus 1,000 mL (has no administration in time range)   nitroGLYCERIN SL tablet 0.4 mg (has no administration in time range)   ondansetron injection 4 mg (has no administration in time range)     Medical Decision Making  Differential diagnosis (includes but is not limited to):   ***    MDM Narrative  ***    Update: ***    Dispo: ***    My independent radiology interpretation: ***  Point of care US (independently performed and interpreted): ***  Decision rules/clinical scoring: ***    Sepsis Perfusion Assessment: ***    Amount and/or Complexity of Data Reviewed  Independent historian: {MDMINDEPENDENTHISTORIAN:26740}   Summary of history: ***  External data reviewed: notes from previous ED visits  Summary of data reviewed: The pt presented to the outside facility with c/o epigastric abdominal pain with associated nausea onset this morning around 0800 while drinking coffee. There, she had a CTA chest performed that showed no acute findings; however, lab work showed a troponin of 9.409. She was given 1 mg/kg of Lovenox and transferred for cardiology services. EMS gave the pt 325 mg of aspirin, sublingual nitroglycerin, multiple rounds of Lopresor, 2 mg of morphine, and 4 mg of Zofran while en route.  Risk and benefits of testing: discussed   Labs: ordered and reviewed  Radiology: ordered and independent interpretation performed (see above or ED course)  ECG/medicine tests: ordered and independent interpretation performed (see above or ED course)  Discussion of management or  test interpretation with external provider(s): {MDMEXTERNALPROVIDER:91861}   Summary of discussion: ***    Risk  {MDMRISK:00165}    Critical Care  {MDMCRITICALCARE:47020}    Data Reviewed/Counseling: I have personally reviewed the patient's vital signs, nursing notes, and other relevant tests, information, and imaging. I had a detailed discussion regarding the historical points, exam findings, and any diagnostic results supporting the discharge diagnosis. I personally performed the history, PE, MDM and procedures as documented above and agree with the scribe's documentation.    Portions of this note were dictated using voice recognition software. Although it was reviewed for accuracy, some inherent voice recognition errors may have occurred and may be present in this document.         Scribe Attestation:   Scribe #1: I performed the above scribed service and the documentation accurately describes the services I performed. I attest to the accuracy of the note.    Attending Attestation:           Physician Attestation for Scribe:  Physician Attestation Statement for Scribe #1: I, Srini Solis MD, reviewed documentation, as scribed by Milagros Garcia in my presence, and it is both accurate and complete.             ED Course as of 02/26/25 2027 Wed Feb 26, 2025 1955 Transfer records reviewed:    64-year-old female presented to an outside hospital in [place] for epigastric pain that has been ongoing throughout the day today.  Her EKG showed no evidence of STEMI criteria.  She underwent a CTA of the chest that has no acute abnormalities.  Her troponin was found to be elevated at 9.409.  She was given 1 milligram/kilogram of Lovenox and transferred to us for cardiology evaluation.  She also received 325 mg of aspirin, sublingual nitro, multiple rounds of Lopressor, 2 of morphine, 4 of Zofran. []   1958 2 previous EKGs were obtained of the outside hospital prior to transfer which I have reviewed showing the  following:    #1: ST @ 126, no ST changes, Qtc 417    #2: ST @ 125, lateral TWI, Qtc 458 []   1958 EKG independently interpreted by me.  EKG: NSR @ 100, no STEMI, Qtc 387 []   1958 OSH CXR reviewed:    No acute lobar consolidation or PTX []   2010 Paged CIS. [RB]      ED Course User Index  [MC] Srini Solis MD  [RB] Milagros Garcia                           Clinical Impression:  Final diagnoses:  [I21.4] NSTEMI (non-ST elevated myocardial infarction)                     [1]  Social History  Tobacco Use    Smoking status: Never    Smokeless tobacco: Never   Substance Use Topics    Alcohol use: Yes     Comment: occ    Drug use: Not Currently

## 2025-02-27 NOTE — PLAN OF CARE
Problem: Adult Inpatient Plan of Care  Goal: Plan of Care Review  2/27/2025 0501 by Virginia Cat RN  Outcome: Progressing  Flowsheets (Taken 2/27/2025 0501)  Plan of Care Reviewed With: patient  2/26/2025 2352 by Virginia Cat RN  Outcome: Progressing  Flowsheets (Taken 2/26/2025 2352)  Plan of Care Reviewed With: patient  Goal: Patient-Specific Goal (Individualized)  2/27/2025 0501 by Virginia Cat RN  Outcome: Progressing  2/26/2025 2352 by Virginia Cat RN  Outcome: Progressing  Goal: Absence of Hospital-Acquired Illness or Injury  2/27/2025 0501 by Virginia Cat RN  Outcome: Progressing  2/26/2025 2352 by Virginia Cat RN  Outcome: Progressing  Intervention: Identify and Manage Fall Risk  2/27/2025 0501 by Virginia Cat RN  Flowsheets (Taken 2/27/2025 0501)  Safety Promotion/Fall Prevention:   assistive device/personal item within reach   Fall Risk signage in place   medications reviewed   side rails raised x 3  2/26/2025 2352 by Virginia Cat RN  Flowsheets (Taken 2/26/2025 2352)  Safety Promotion/Fall Prevention:   assistive device/personal item within reach   Fall Risk signage in place   medications reviewed   side rails raised x 3  Intervention: Prevent Skin Injury  2/27/2025 0501 by Virginia Cat RN  Flowsheets (Taken 2/27/2025 0501)  Body Position: position changed independently  Skin Protection: transparent dressing maintained  Device Skin Pressure Protection: positioning supports utilized  2/26/2025 2352 by Virginia Cat RN  Flowsheets (Taken 2/26/2025 2352)  Body Position: position changed independently  Skin Protection: transparent dressing maintained  Device Skin Pressure Protection: positioning supports utilized  Intervention: Prevent and Manage VTE (Venous Thromboembolism) Risk  2/27/2025 0501 by Virginia Cat RN  Flowsheets (Taken 2/27/2025 0501)  VTE Prevention/Management:   bleeding risk assessed   ambulation promoted   bleeding  precautions maintained  2/26/2025 2352 by Virginia Cat RN  Flowsheets (Taken 2/26/2025 2352)  VTE Prevention/Management:   bleeding risk assessed   ambulation promoted   bleeding precautions maintained  Intervention: Prevent Infection  2/27/2025 0501 by Virginia Cat RN  Flowsheets (Taken 2/27/2025 0501)  Infection Prevention:   rest/sleep promoted   hand hygiene promoted  2/26/2025 2352 by Virginia Cat RN  Flowsheets (Taken 2/26/2025 2352)  Infection Prevention:   rest/sleep promoted   hand hygiene promoted   environmental surveillance performed  Goal: Optimal Comfort and Wellbeing  2/27/2025 0501 by Virginia Cat RN  Outcome: Progressing  2/26/2025 2352 by Virginia Cat RN  Outcome: Progressing  Intervention: Monitor Pain and Promote Comfort  2/27/2025 0501 by Virginia Cat RN  Flowsheets (Taken 2/27/2025 0501)  Pain Management Interventions:   care clustered   medication offered   relaxation techniques promoted  2/26/2025 2352 by Virginia Cat RN  Flowsheets (Taken 2/26/2025 2352)  Pain Management Interventions:   care clustered   relaxation techniques promoted   quiet environment facilitated  Intervention: Provide Person-Centered Care  2/27/2025 0501 by Virginia Cat RN  Flowsheets (Taken 2/27/2025 0501)  Trust Relationship/Rapport:   care explained   thoughts/feelings acknowledged   emotional support provided  2/26/2025 2352 by Virginia Cat RN  Flowsheets (Taken 2/26/2025 2352)  Trust Relationship/Rapport:   care explained   thoughts/feelings acknowledged   reassurance provided   emotional support provided  Goal: Readiness for Transition of Care  2/27/2025 0501 by Virginia Cat RN  Outcome: Progressing  2/26/2025 2352 by Virginia Cat RN  Outcome: Progressing

## 2025-02-28 VITALS
DIASTOLIC BLOOD PRESSURE: 60 MMHG | OXYGEN SATURATION: 95 % | WEIGHT: 118 LBS | HEART RATE: 77 BPM | RESPIRATION RATE: 17 BRPM | SYSTOLIC BLOOD PRESSURE: 90 MMHG | BODY MASS INDEX: 20.91 KG/M2 | HEIGHT: 63 IN | TEMPERATURE: 99 F

## 2025-02-28 PROBLEM — R07.9 CHEST PAIN: Status: ACTIVE | Noted: 2025-02-28

## 2025-02-28 PROBLEM — I21.4 NSTEMI (NON-ST ELEVATED MYOCARDIAL INFARCTION): Status: ACTIVE | Noted: 2025-02-28

## 2025-02-28 LAB
ALBUMIN SERPL-MCNC: 3.5 G/DL (ref 3.4–4.8)
ALBUMIN/GLOB SERPL: 1.5 RATIO (ref 1.1–2)
ALP SERPL-CCNC: 45 UNIT/L (ref 40–150)
ALT SERPL-CCNC: 11 UNIT/L (ref 0–55)
ANION GAP SERPL CALC-SCNC: 9 MEQ/L
AST SERPL-CCNC: 40 UNIT/L (ref 5–34)
BASOPHILS # BLD AUTO: 0.03 X10(3)/MCL
BASOPHILS NFR BLD AUTO: 0.3 %
BILIRUB SERPL-MCNC: 0.6 MG/DL
BUN SERPL-MCNC: 8.6 MG/DL (ref 9.8–20.1)
CALCIUM SERPL-MCNC: 8.3 MG/DL (ref 8.4–10.2)
CHLORIDE SERPL-SCNC: 109 MMOL/L (ref 98–107)
CO2 SERPL-SCNC: 21 MMOL/L (ref 23–31)
CREAT SERPL-MCNC: 0.79 MG/DL (ref 0.55–1.02)
CREAT/UREA NIT SERPL: 11
EOSINOPHIL # BLD AUTO: 0.02 X10(3)/MCL (ref 0–0.9)
EOSINOPHIL NFR BLD AUTO: 0.2 %
ERYTHROCYTE [DISTWIDTH] IN BLOOD BY AUTOMATED COUNT: 13.5 % (ref 11.5–17)
GFR SERPLBLD CREATININE-BSD FMLA CKD-EPI: >60 ML/MIN/1.73/M2
GLOBULIN SER-MCNC: 2.3 GM/DL (ref 2.4–3.5)
GLUCOSE SERPL-MCNC: 97 MG/DL (ref 82–115)
HCT VFR BLD AUTO: 38.1 % (ref 37–47)
HGB BLD-MCNC: 12.3 G/DL (ref 12–16)
IMM GRANULOCYTES # BLD AUTO: 0.04 X10(3)/MCL (ref 0–0.04)
IMM GRANULOCYTES NFR BLD AUTO: 0.3 %
LYMPHOCYTES # BLD AUTO: 0.7 X10(3)/MCL (ref 0.6–4.6)
LYMPHOCYTES NFR BLD AUTO: 5.9 %
MAGNESIUM SERPL-MCNC: 2.2 MG/DL (ref 1.6–2.6)
MCH RBC QN AUTO: 29.5 PG (ref 27–31)
MCHC RBC AUTO-ENTMCNC: 32.3 G/DL (ref 33–36)
MCV RBC AUTO: 91.4 FL (ref 80–94)
MONOCYTES # BLD AUTO: 1 X10(3)/MCL (ref 0.1–1.3)
MONOCYTES NFR BLD AUTO: 8.4 %
NEUTROPHILS # BLD AUTO: 10.11 X10(3)/MCL (ref 2.1–9.2)
NEUTROPHILS NFR BLD AUTO: 84.9 %
NRBC BLD AUTO-RTO: 0 %
OHS QRS DURATION: 66 MS
OHS QRS DURATION: 76 MS
OHS QTC CALCULATION: 534 MS
OHS QTC CALCULATION: 580 MS
PLATELET # BLD AUTO: 230 X10(3)/MCL (ref 130–400)
PMV BLD AUTO: 11.9 FL (ref 7.4–10.4)
POTASSIUM SERPL-SCNC: 3.9 MMOL/L (ref 3.5–5.1)
PROT SERPL-MCNC: 5.8 GM/DL (ref 5.8–7.6)
RBC # BLD AUTO: 4.17 X10(6)/MCL (ref 4.2–5.4)
SODIUM SERPL-SCNC: 139 MMOL/L (ref 136–145)
WBC # BLD AUTO: 11.9 X10(3)/MCL (ref 4.5–11.5)

## 2025-02-28 PROCEDURE — 93010 ELECTROCARDIOGRAM REPORT: CPT | Mod: ,,, | Performed by: INTERNAL MEDICINE

## 2025-02-28 PROCEDURE — 25000003 PHARM REV CODE 250: Performed by: NURSE PRACTITIONER

## 2025-02-28 PROCEDURE — 93005 ELECTROCARDIOGRAM TRACING: CPT

## 2025-02-28 PROCEDURE — 36415 COLL VENOUS BLD VENIPUNCTURE: CPT | Performed by: INTERNAL MEDICINE

## 2025-02-28 PROCEDURE — 80053 COMPREHEN METABOLIC PANEL: CPT | Performed by: INTERNAL MEDICINE

## 2025-02-28 PROCEDURE — 93010 ELECTROCARDIOGRAM REPORT: CPT | Mod: 76,,, | Performed by: INTERNAL MEDICINE

## 2025-02-28 PROCEDURE — 85025 COMPLETE CBC W/AUTO DIFF WBC: CPT | Performed by: INTERNAL MEDICINE

## 2025-02-28 PROCEDURE — 25000003 PHARM REV CODE 250

## 2025-02-28 PROCEDURE — 83735 ASSAY OF MAGNESIUM: CPT | Performed by: INTERNAL MEDICINE

## 2025-02-28 PROCEDURE — 25000003 PHARM REV CODE 250: Performed by: INTERNAL MEDICINE

## 2025-02-28 PROCEDURE — 25000242 PHARM REV CODE 250 ALT 637 W/ HCPCS: Performed by: STUDENT IN AN ORGANIZED HEALTH CARE EDUCATION/TRAINING PROGRAM

## 2025-02-28 RX ORDER — ATORVASTATIN CALCIUM 80 MG/1
80 TABLET, FILM COATED ORAL DAILY
Qty: 90 TABLET | Refills: 1 | Status: SHIPPED | OUTPATIENT
Start: 2025-02-28 | End: 2026-02-28

## 2025-02-28 RX ORDER — ASPIRIN 81 MG/1
81 TABLET ORAL DAILY
Qty: 90 TABLET | Refills: 3 | Status: ON HOLD | OUTPATIENT
Start: 2025-02-28 | End: 2025-03-06 | Stop reason: HOSPADM

## 2025-02-28 RX ORDER — METOPROLOL SUCCINATE 50 MG/1
50 TABLET, EXTENDED RELEASE ORAL DAILY
Qty: 90 TABLET | Refills: 3 | Status: ON HOLD | OUTPATIENT
Start: 2025-02-28 | End: 2025-03-06 | Stop reason: HOSPADM

## 2025-02-28 RX ORDER — LOSARTAN POTASSIUM 25 MG/1
12.5 TABLET ORAL DAILY
Qty: 45 TABLET | Refills: 3 | Status: SHIPPED | OUTPATIENT
Start: 2025-02-28 | End: 2026-02-28

## 2025-02-28 RX ORDER — METOPROLOL SUCCINATE 50 MG/1
50 TABLET, EXTENDED RELEASE ORAL DAILY
Status: DISCONTINUED | OUTPATIENT
Start: 2025-02-28 | End: 2025-02-28 | Stop reason: HOSPADM

## 2025-02-28 RX ORDER — POTASSIUM CHLORIDE 20 MEQ/1
20 TABLET, EXTENDED RELEASE ORAL ONCE
Status: COMPLETED | OUTPATIENT
Start: 2025-02-28 | End: 2025-02-28

## 2025-02-28 RX ORDER — PANTOPRAZOLE SODIUM 40 MG/1
40 TABLET, DELAYED RELEASE ORAL DAILY
Qty: 30 TABLET | Refills: 0 | Status: SHIPPED | OUTPATIENT
Start: 2025-02-28 | End: 2026-02-28

## 2025-02-28 RX ORDER — NITROGLYCERIN 0.4 MG/1
0.4 TABLET SUBLINGUAL EVERY 5 MIN PRN
Qty: 10 TABLET | Refills: 1 | Status: SHIPPED | OUTPATIENT
Start: 2025-02-28 | End: 2026-02-28

## 2025-02-28 RX ADMIN — POTASSIUM CHLORIDE 20 MEQ: 1500 TABLET, EXTENDED RELEASE ORAL at 09:02

## 2025-02-28 RX ADMIN — TICAGRELOR 90 MG: 90 TABLET ORAL at 09:02

## 2025-02-28 RX ADMIN — METOPROLOL SUCCINATE 50 MG: 25 TABLET, EXTENDED RELEASE ORAL at 09:02

## 2025-02-28 RX ADMIN — ATORVASTATIN CALCIUM 80 MG: 40 TABLET, FILM COATED ORAL at 09:02

## 2025-02-28 RX ADMIN — ACETAMINOPHEN 1000 MG: 500 TABLET ORAL at 07:02

## 2025-02-28 RX ADMIN — NITROGLYCERIN 0.4 MG: 0.4 TABLET, ORALLY DISINTEGRATING SUBLINGUAL at 06:02

## 2025-02-28 RX ADMIN — PANTOPRAZOLE SODIUM 40 MG: 40 TABLET, DELAYED RELEASE ORAL at 09:02

## 2025-02-28 RX ADMIN — SODIUM CHLORIDE: 9 INJECTION, SOLUTION INTRAVENOUS at 06:02

## 2025-02-28 RX ADMIN — ASPIRIN 81 MG: 81 TABLET, COATED ORAL at 09:02

## 2025-02-28 NOTE — PROGRESS NOTES
[facility]    Cardiology  Progress Note    Patient Name: Hien Roy  MRN: [id number]  Admission Date: 2025  Hospital Length of Stay: 2 days  Code Status: Full Code   Attending Physician: Reyes, Thairy G, DO   Primary Care Physician: Deana Jaimes FNP  Expected Discharge Date: 2025  Principal Problem:NSTEMI (non-ST elevated myocardial infarction)    Subjective:     HPI:   Chief Complaint/Reason for Consult: NSTEMI     HPI:   Ms. Roy is a 64 year old female, unknown to [facility], who was transferred to [facility] from outside facility for cardiology services. Patient presented to Outside hospital with abdominal pain. Also reported nausea while drinking coffee that morning (25). CT Angiogram chest revealed no acute findings. Troponin elevated with peak value of 9.4. . CT Abdomen revealed Diverticulosis coli & Mild soft tissue fullness at the cervical vaginal region. CXR revealed Nonspecific central interstitial opacities. EKG revealed SR with non specific T Wave Abnormalities. Patient loaded with Aspirin and Given FD Lovenox on 25 afternoon. [facility] consulted for cardiac evaluation and workup related to the NSTEMI.    Hospital Course:  25: NAD Noted. BP Stable. SR HR 90's. Planning DC Home Today.     PMH: No Significant Past Medical History  PSH: , Wrist Surgery  Family History: None  Social History: Tobacco- Negative, Alcohol- Occasional Use, Substance Abuse- Negative     Previous Cardiac Diagnostics:   Echocardiogram (25):  Left Ventricle: The left ventricle is normal in size. Normal wall thickness. There is mildly reduced systolic function with a visually estimated ejection fraction of 40 - 45%. Grade I diastolic dysfunction. [place] is akinetic and resembles takosubo syndrome  Right Ventricle: The right ventricle is normal in size. Systolic function is normal. TAPSE is 1.68 cm.  Mitral Valve: Mildly thickened leaflets. There is moderate  regurgitation.  Tricuspid Valve: There is mild regurgitation.  Pericardium: There is no pericardial effusion.    [facility] with PCI (2.27.25):  Coronary findings:  Dominance: right   Left main:  Patent vessel that bifurcates into a LAD and circumflex system.  Left anterior descending artery:  Large type 2 vessel with diffuse proximal to mid segment stenosis up to 80% by IVUS.  1st diagonal is a moderate-sized vessel that originates proximal to the stenosis.  Circumflex artery:  Patent nondominant vessel that gives rise to a large obtuse marginal-1.  The AV groove artery arises before the obtuse marginal and terminates in a small terminal OM-2  Right coronary artery:  Patent dominant vessel terminates in a PDA and a moderate-sized JHONATHAN  Left ventriculography:  The ejection fraction was 40% with apical ballooning suggestive of takotsubo. The EDP was 17 mmHg.   Intervention: Proximal to mid LAD 80% stenosis reduced to 0% following IVUS guided TIEN using a 3.5 x 34 marcela post dilated proximally with a 4 x 6 NC.  The moderate-sized diagonal was placed in stent retirement with KAYE 3 flow throughout the vessel at the completion of the procedure.     CT Angiogram Chest (2.26.25):  FINDINGS:  There is no central pulmonary arterial filling defect to suggest pulmonary thromboembolism.  Cardiac chamber sizes are within normal limits.  There is no pleural or pericardial fluid.  There are no enlarged intrathoracic lymph nodes.  There are subcentimeter bilateral hilar short axis lymph nodes.  There is no dense lobar consolidation or suspicious focal pulmonary parenchymal finding.  There is no acute finding in the included abdomen. There is no acute finding in the included body wall. There is no acute finding in the included base of the neck.  Impression:  No convincing evidence of pulmonary thromboembolism.  There are nonspecific subcentimeter bilateral hilar short axis lymph nodes.    Review of Systems   Cardiovascular:  Negative for chest  "pain.   Respiratory:  Negative for shortness of breath.    All other systems reviewed and are negative.    Objective:     Vital Signs (Most Recent):  Temp: 99.7 °F (37.6 °C) (02/28/25 0743)  Pulse: 89 (02/28/25 0743)  Resp: 18 (02/27/25 2318)  BP: 105/71 (02/28/25 0743)  SpO2: 97 % (02/28/25 0743) Vital Signs (24h Range):  Temp:  [97.5 °F (36.4 °C)-99.7 °F (37.6 °C)] 99.7 °F (37.6 °C)  Pulse:  [64-93] 89  Resp:  [17-18] 18  SpO2:  [89 %-98 %] 97 %  BP: (102-127)/(71-85) 105/71   Weight: 53.5 kg (118 lb)  Body mass index is 20.9 kg/m².  SpO2: 97 %       Intake/Output Summary (Last 24 hours) at 2/28/2025 0941  Last data filed at 2/27/2025 1336  Gross per 24 hour   Intake 800 ml   Output --   Net 800 ml     Lines/Drains/Airways       Peripheral Intravenous Line  Duration                  Peripheral IV - Single Lumen 02/26/25 1704 20 G Right Antecubital 1 day                    Significant Labs:   Chemistries:   Recent Labs   Lab 02/26/25  1428 02/26/25  1428 02/26/25  1705 02/26/25  2043 02/27/25  0637 02/28/25  0307     --  138 137 140 139   K 3.7  --  4.0 3.4* 3.9 3.9     --  104 105 106 109*   CO2 23  --  22* 23 23 21*   BUN 15.0  --  14.0 11.5 11.5 8.6*   CREATININE 0.81  --  0.89 0.84 0.95 0.79   CALCIUM 11.1*  --  10.5* 8.9 9.5 8.3*   BILITOT  --    < > 0.7 0.6 0.7 0.6   ALKPHOS  --    < > 60 49 55 45   ALT  --    < > 12 10 13 11   AST  --    < > 56* 43* 49* 40*   GLUCOSE 138*  --  122* 104 95 97   MG  --   --   --   --  1.80 2.20   TROPONINI  --   --  9.409* 8.370* 6.143*  --     < > = values in this interval not displayed.        CBC/Anemia Labs: Coags:    Recent Labs   Lab 02/26/25  2043 02/27/25  0637 02/28/25  0307   WBC 10.19 7.57 11.90*   HGB 12.5 13.4 12.3   HCT 38.2 41.2 38.1    286 230   MCV 90.3 90.4 91.4   RDW 13.3 13.6 13.5    No results for input(s): "PT", "INR", "APTT" in the last 168 hours.     EKG:      Telemetry:  SR    Physical Exam  Vitals and nursing note reviewed. "   Constitutional:       General: She is not in acute distress.     Appearance: Normal appearance. She is not ill-appearing.   HENT:      Head: Normocephalic.      Mouth/Throat:      Mouth: Mucous membranes are moist.      Pharynx: Oropharynx is clear.   Cardiovascular:      Rate and Rhythm: Normal rate and regular rhythm.      Heart sounds: Normal heart sounds.   Pulmonary:      Effort: Pulmonary effort is normal. No respiratory distress.      Breath sounds: Normal breath sounds. No wheezing or rales.   Abdominal:      General: There is no distension.      Palpations: Abdomen is soft.      Tenderness: There is no abdominal tenderness. There is no guarding.   Musculoskeletal:         General: Normal range of motion.      Right lower leg: No edema.      Left lower leg: No edema.   Skin:     General: Skin is warm and dry.      Comments: Right Radial Cath Site Soft with no evidence of hematoma. 2+ Right Radial Pulse Noted.    Neurological:      General: No focal deficit present.      Mental Status: She is alert and oriented to person, place, and time. Mental status is at baseline.   Psychiatric:         Mood and Affect: Mood normal.         Behavior: Behavior normal.         Current Schedule Inpatient Medications:   aspirin  81 mg Oral Daily    atorvastatin  80 mg Oral Daily    enoxaparin  40 mg Subcutaneous Daily    losartan  12.5 mg Oral Daily    metoprolol succinate  50 mg Oral Daily    pantoprazole  40 mg Oral Daily    potassium chloride  20 mEq Oral Once    ticagrelor  90 mg Oral BID     Continuous Infusions:   0.9% NaCl   Intravenous Continuous 75 mL/hr at 02/28/25 0618 New Bag at 02/28/25 0618       Assessment:   NSTEMI Type I (Anterior Wall)    - Epigastric Pain    - CT Angiogram Chest (2.26.25): no central pulmonary arterial filling defect to suggest pulmonary thromboembolism.    - Aspirin Load given on 2.26.25, FD Lovenox Last Dose  2.26.25 1845 Hours  CAD    - Status Post PCI/TIEN Placement Proximal to Mid LAD  80% Lesion (2.27.25)  ICMO/Takotsubo Cardiomyopathy    - EF 40-45% with [place] is akinetic and resembles takosubo syndrome (2.27.25)  Acute Combined Sysotlic & Diastolic HF (Compensated on RA)    - EF 40-45% with Grade I Diastolic Dysfunction  VHD    - MR: Moderate   Diverticulosis Coli (CT Imaging)  Leukocytosis (Resolved)  Anxiety  No History of GI Bleed    Plan:   Continue Brilinta 90 Mg PO BID & Aspirin 81 Mg Daily (Post LAD Stenting 2.27.25)  Continue HF Medications  Continue High Intensity Statin  Continue Protonix for Gastric Protection while on DAPT, Nitro SL PRN CP  Right Radial Precautions/Activity Restrictions Discussed  DC Home Today  Follow up with [facility]- ideally patient would like to transition outpatient cardiac care to [facility] in [place].    Heart Failure GDMT:  LVEF: 40-45%  BB: Toprol XL 50 Mg Daily  ACEi/ARB/ARNI: Losartan 12.5 Mg Daily  MRNA: None  SGLT2-Inhibitor: None  Maintenance Diuretic: None (Euvolemic on Exam)  ICD/Device: None  Discharge Dry Weight: 53.5 Kg  Cardiomems Candidate: Not at current time  HF Clinic Referral Placed:  No    ASHLEIGH Morrow  Cardiology  [facility]

## 2025-02-28 NOTE — DISCHARGE SUMMARY
[facility] - 9th Floor [facility] Medicine  Discharge Summary      Patient Name: Hien Roy  MRN: [id number]  [facility]: [id number]  Patient Class: IP- Inpatient  Admission Date: 2/26/2025  Hospital Length of Stay: 2 days  Discharge Date and Time:  02/28/2025 9:59 AM  Attending Physician: Reyes, Thairy G, DO   Discharging Provider: Daron Wylie MD  Primary Care Provider: Deana Jaimes FNP    Primary Care Team: Networked reference to record PCT     64-year-old woman with no known significant past medical history initially seen at an outside facility for complaints of epigastric/lower chest abdominal pain associated with nausea.  Workup at outside hospital ruled out PE but patient was seen to have elevated troponin with BNP of 278.  C diff abdomen showed diverticulosis.  She was given aspirin, nitro, full-dose Lovenox and transferred to [facility] for cardiology evaluation.       At presentation she was afebrile, hemodynamically stable.  Lab work showed no major electrolyte abnormalities.  Troponin trended down but remains elevated.  HDL 67, total cholesterol was 199.    Heparin drip was continued while inpatient and cardiology scheduled for angiogram.  Angiogram revealed:          Proximal to mid LAD with 80 % noncalcified stenosis treated with IVUS TIEN using a 3.5 x 34 marcela stent post dilated with a 4.0 x 6 NC.  The moderate-sized diagonal was placed in stent assisted with KAYE 3 flow at the completion of the procedure.    Remaining coronaries demonstrated patent anatomy    The ejection fraction was 40% with apical ballooning suggestive of takotsubo.  The EDP was 17 mmHg.    Right radial approach    Estimated blood loss was less than 10 cc.  Contrast delivery 200 cc.    She will be discharged to home today.  Counseled on medication adherence, outpatient follow up.  Patient verbalized understanding.           NSTEMI-type 1   CAD s/p PCI 2/27/25  HF 40% with  ballooning-suggestive of takotsubo   Epigastric/lower chest pain at admission-?  Angina  Incidental diverticulosis coli seen on CT  Chronic severe anxiety        Procedure(s) (LRB):  Left heart cath (N/A)          Goals of Care Treatment Preferences:  Code Status: Full Code    Vitals:    02/28/25 0743   BP: 105/71   Pulse: 89   Resp:    Temp: 99.7 °F (37.6 °C)        General: Comfortable, not in distress  Respiratory: Clear to auscultation bilaterally, nonlabored breathing  Cardiovascular: RRR, S1, S2  Abdominal: Soft, nontender, nondistended  Neurological: AOx4, no focal deficits  Psychiatric: Cooperative        SDOH Screening:  The patient was screened for utility difficulties, food insecurity, transport difficulties, housing insecurity, and interpersonal safety and there were no concerns identified this admission.     Consults:   Consults (From admission, onward)          Status Ordering Provider     Inpatient consult to Cardiology  Once        Provider:  Danny White MD    Completed LEÓN MOTTA notes have been filed under this hospital service.  Service: Hospital Medicine    Final Active Diagnoses:    Diagnosis Date Noted POA    PRINCIPAL PROBLEM:  NSTEMI (non-ST elevated myocardial infarction) [I21.4] 02/28/2025 Yes    Chest pain [R07.9] 02/28/2025 Yes      Problems Resolved During this Admission:       Discharged Condition: good    Disposition: Home or Self Care    Follow Up:   Follow-up Information       Inna Turner MD Follow up in 1 week(s).    Specialties: Cardiovascular Disease, Cardiology  Why: Hospital Follow Up  Contact information:  [address] [place] [address] [id number] [phone]                           Patient Instructions:      Diet Cardiac     Lifting restrictions   Order Comments: Avoid Heavy Lifting with Right Arm > 5-10 lbs x 1 Week.  Keep Right Radial Site Clean Dry Open to Air.     Notify your health care provider if you experience any of the following:  temperature  >100.4     Notify your health care provider if you experience any of the following:  severe uncontrolled pain     Notify your health care provider if you experience any of the following:  redness, tenderness, or signs of infection (pain, swelling, redness, odor or green/yellow discharge around incision site)     Notify your health care provider if you experience any of the following:  difficulty breathing or increased cough     Activity as tolerated       Significant Diagnostic Studies: Labs: CMP   Recent Labs   Lab 02/26/25  2043 02/27/25  0637 02/28/25  0307    140 139   K 3.4* 3.9 3.9    106 109*   CO2 23 23 21*   BUN 11.5 11.5 8.6*   CREATININE 0.84 0.95 0.79   CALCIUM 8.9 9.5 8.3*   ALBUMIN 3.5 4.1 3.5   BILITOT 0.6 0.7 0.6   ALKPHOS 49 55 45   AST 43* 49* 40*   ALT 10 13 11       Pending Diagnostic Studies:       None           Medications:  Reconciled Home Medications:      Medication List        START taking these medications      aspirin 81 MG EC tablet  Commonly known as: ECOTRIN  Take 1 tablet (81 mg total) by mouth once daily.     atorvastatin 80 MG tablet  Commonly known as: LIPITOR  Take 1 tablet (80 mg total) by mouth once daily.     losartan 25 MG tablet  Commonly known as: COZAAR  Take 0.5 tablets (12.5 mg total) by mouth once daily.     metoprolol succinate 50 MG 24 hr tablet  Commonly known as: TOPROL-XL  Take 1 tablet (50 mg total) by mouth once daily.     nitroGLYCERIN 0.4 MG SL tablet  Commonly known as: NITROSTAT  Place 1 tablet (0.4 mg total) under the tongue every 5 (five) minutes as needed for Chest pain.     pantoprazole 40 MG tablet  Commonly known as: PROTONIX  Take 1 tablet (40 mg total) by mouth once daily.     ticagrelor 90 mg tablet  Commonly known as: BRILINTA  Take 1 tablet (90 mg total) by mouth 2 (two) times daily.              Indwelling Lines/Drains at time of discharge:   Lines/Drains/Airways       None                   Time spent on the discharge of patient: 35  minutes         Daron Wylie MD  Department of Hospital Medicine  [facility] - 9th Floor Medical Telemetry

## 2025-03-01 ENCOUNTER — HOSPITAL ENCOUNTER (EMERGENCY)
Facility: HOSPITAL | Age: 65
Discharge: ANOTHER HOSPITAL | End: 2025-03-01
Attending: EMERGENCY MEDICINE
Payer: MEDICARE

## 2025-03-01 ENCOUNTER — HOSPITAL ENCOUNTER (INPATIENT)
Facility: HOSPITAL | Age: 65
LOS: 5 days | Discharge: HOME | DRG: 280 | End: 2025-03-06
Attending: INTERNAL MEDICINE | Admitting: INTERNAL MEDICINE
Payer: MEDICARE

## 2025-03-01 VITALS
DIASTOLIC BLOOD PRESSURE: 61 MMHG | SYSTOLIC BLOOD PRESSURE: 91 MMHG | WEIGHT: 118 LBS | OXYGEN SATURATION: 96 % | RESPIRATION RATE: 18 BRPM | HEIGHT: 63 IN | HEART RATE: 83 BPM | BODY MASS INDEX: 20.91 KG/M2 | TEMPERATURE: 100 F

## 2025-03-01 DIAGNOSIS — I49.9 ABNORMAL HEART RHYTHM: ICD-10-CM

## 2025-03-01 DIAGNOSIS — I48.92 ATRIAL FLUTTER: ICD-10-CM

## 2025-03-01 DIAGNOSIS — R07.9 CHEST PAIN: ICD-10-CM

## 2025-03-01 DIAGNOSIS — I48.91 ATRIAL FIBRILLATION: ICD-10-CM

## 2025-03-01 DIAGNOSIS — I49.9 ARRHYTHMIA: ICD-10-CM

## 2025-03-01 DIAGNOSIS — R10.13 EPIGASTRIC PAIN: Primary | ICD-10-CM

## 2025-03-01 DIAGNOSIS — R79.89 ELEVATED TROPONIN: ICD-10-CM

## 2025-03-01 DIAGNOSIS — R10.9 ABDOMINAL PAIN: ICD-10-CM

## 2025-03-01 DIAGNOSIS — J18.9 PNEUMONIA OF RIGHT LOWER LOBE DUE TO INFECTIOUS ORGANISM: Primary | ICD-10-CM

## 2025-03-01 DIAGNOSIS — R00.0 TACHYCARDIA: ICD-10-CM

## 2025-03-01 LAB
ALBUMIN SERPL-MCNC: 3.5 G/DL (ref 3.4–4.8)
ALBUMIN/GLOB SERPL: 0.9 RATIO (ref 1.1–2)
ALP SERPL-CCNC: 65 UNIT/L (ref 40–150)
ALT SERPL-CCNC: 16 UNIT/L (ref 0–55)
ANION GAP SERPL CALC-SCNC: 11 MEQ/L
AST SERPL-CCNC: 28 UNIT/L (ref 5–34)
BASOPHILS # BLD AUTO: 0.03 X10(3)/MCL
BASOPHILS NFR BLD AUTO: 0.2 %
BILIRUB SERPL-MCNC: 1 MG/DL
BNP BLD-MCNC: 257.9 PG/ML
BUN SERPL-MCNC: 9 MG/DL (ref 9.8–20.1)
CALCIUM SERPL-MCNC: 9.4 MG/DL (ref 8.4–10.2)
CHLORIDE SERPL-SCNC: 106 MMOL/L (ref 98–107)
CO2 SERPL-SCNC: 20 MMOL/L (ref 23–31)
CREAT SERPL-MCNC: 0.76 MG/DL (ref 0.55–1.02)
CREAT/UREA NIT SERPL: 12
EOSINOPHIL # BLD AUTO: 0 X10(3)/MCL (ref 0–0.9)
EOSINOPHIL NFR BLD AUTO: 0 %
ERYTHROCYTE [DISTWIDTH] IN BLOOD BY AUTOMATED COUNT: 13.7 % (ref 11.5–17)
GFR SERPLBLD CREATININE-BSD FMLA CKD-EPI: >60 ML/MIN/1.73/M2
GLOBULIN SER-MCNC: 3.8 GM/DL (ref 2.4–3.5)
GLUCOSE SERPL-MCNC: 131 MG/DL (ref 82–115)
HCT VFR BLD AUTO: 37.5 % (ref 37–47)
HGB BLD-MCNC: 12.8 G/DL (ref 12–16)
IMM GRANULOCYTES # BLD AUTO: 0.07 X10(3)/MCL (ref 0–0.04)
IMM GRANULOCYTES NFR BLD AUTO: 0.5 %
LACTATE SERPL-SCNC: 1.4 MMOL/L (ref 0.5–2.2)
LIPASE SERPL-CCNC: 21 U/L
LYMPHOCYTES # BLD AUTO: 0.55 X10(3)/MCL (ref 0.6–4.6)
LYMPHOCYTES NFR BLD AUTO: 3.6 %
MCH RBC QN AUTO: 30 PG (ref 27–31)
MCHC RBC AUTO-ENTMCNC: 34.1 G/DL (ref 33–36)
MCV RBC AUTO: 87.8 FL (ref 80–94)
MONOCYTES # BLD AUTO: 1.73 X10(3)/MCL (ref 0.1–1.3)
MONOCYTES NFR BLD AUTO: 11.3 %
NEUTROPHILS # BLD AUTO: 12.93 X10(3)/MCL (ref 2.1–9.2)
NEUTROPHILS NFR BLD AUTO: 84.4 %
NRBC BLD AUTO-RTO: 0 %
PLATELET # BLD AUTO: 275 X10(3)/MCL (ref 130–400)
PMV BLD AUTO: 11.8 FL (ref 7.4–10.4)
POTASSIUM SERPL-SCNC: 3.9 MMOL/L (ref 3.5–5.1)
PROT SERPL-MCNC: 7.3 GM/DL (ref 5.8–7.6)
RBC # BLD AUTO: 4.27 X10(6)/MCL (ref 4.2–5.4)
SODIUM SERPL-SCNC: 137 MMOL/L (ref 136–145)
TROPONIN I SERPL-MCNC: 1.39 NG/ML (ref 0–0.04)
TROPONIN I SERPL-MCNC: 1.43 NG/ML (ref 0–0.04)
WBC # BLD AUTO: 15.31 X10(3)/MCL (ref 4.5–11.5)

## 2025-03-01 PROCEDURE — 83880 ASSAY OF NATRIURETIC PEPTIDE: CPT

## 2025-03-01 PROCEDURE — 93005 ELECTROCARDIOGRAM TRACING: CPT

## 2025-03-01 PROCEDURE — 83605 ASSAY OF LACTIC ACID: CPT

## 2025-03-01 PROCEDURE — 80053 COMPREHEN METABOLIC PANEL: CPT

## 2025-03-01 PROCEDURE — 84484 ASSAY OF TROPONIN QUANT: CPT

## 2025-03-01 PROCEDURE — 25000003 PHARM REV CODE 250

## 2025-03-01 PROCEDURE — 25500020 PHARM REV CODE 255: Performed by: EMERGENCY MEDICINE

## 2025-03-01 PROCEDURE — 63700000 PHARM REV CODE 250 ALT 637 W/O HCPCS

## 2025-03-01 PROCEDURE — 83690 ASSAY OF LIPASE: CPT

## 2025-03-01 PROCEDURE — 93010 ELECTROCARDIOGRAM REPORT: CPT | Mod: ,,, | Performed by: INTERNAL MEDICINE

## 2025-03-01 PROCEDURE — 87040 BLOOD CULTURE FOR BACTERIA: CPT

## 2025-03-01 PROCEDURE — 63600175 PHARM REV CODE 636 W HCPCS

## 2025-03-01 PROCEDURE — 99285 EMERGENCY DEPT VISIT HI MDM: CPT | Mod: 25

## 2025-03-01 PROCEDURE — 85025 COMPLETE CBC W/AUTO DIFF WBC: CPT

## 2025-03-01 PROCEDURE — 96365 THER/PROPH/DIAG IV INF INIT: CPT

## 2025-03-01 PROCEDURE — 11000001 HC ACUTE MED/SURG PRIVATE ROOM

## 2025-03-01 PROCEDURE — 25000003 PHARM REV CODE 250: Performed by: EMERGENCY MEDICINE

## 2025-03-01 PROCEDURE — 96366 THER/PROPH/DIAG IV INF ADDON: CPT

## 2025-03-01 PROCEDURE — 96375 TX/PRO/DX INJ NEW DRUG ADDON: CPT

## 2025-03-01 RX ORDER — ALUMINUM HYDROXIDE, MAGNESIUM HYDROXIDE, AND SIMETHICONE 1200; 120; 1200 MG/30ML; MG/30ML; MG/30ML
30 SUSPENSION ORAL ONCE
Status: COMPLETED | OUTPATIENT
Start: 2025-03-01 | End: 2025-03-01

## 2025-03-01 RX ORDER — LIDOCAINE HYDROCHLORIDE 20 MG/ML
15 SOLUTION OROPHARYNGEAL ONCE
Status: COMPLETED | OUTPATIENT
Start: 2025-03-01 | End: 2025-03-01

## 2025-03-01 RX ORDER — DILTIAZEM HYDROCHLORIDE 5 MG/ML
15 INJECTION INTRAVENOUS
Status: COMPLETED | OUTPATIENT
Start: 2025-03-01 | End: 2025-03-01

## 2025-03-01 RX ORDER — IOPAMIDOL 755 MG/ML
100 INJECTION, SOLUTION INTRAVASCULAR
Status: COMPLETED | OUTPATIENT
Start: 2025-03-01 | End: 2025-03-01

## 2025-03-01 RX ORDER — FUROSEMIDE 40 MG/1
40 TABLET ORAL
Status: COMPLETED | OUTPATIENT
Start: 2025-03-01 | End: 2025-03-01

## 2025-03-01 RX ORDER — AZITHROMYCIN 250 MG/1
500 TABLET, FILM COATED ORAL
Status: COMPLETED | OUTPATIENT
Start: 2025-03-01 | End: 2025-03-01

## 2025-03-01 RX ORDER — DILTIAZEM HYDROCHLORIDE 5 MG/ML
20 INJECTION INTRAVENOUS
Status: COMPLETED | OUTPATIENT
Start: 2025-03-01 | End: 2025-03-01

## 2025-03-01 RX ORDER — ASPIRIN 325 MG
325 TABLET ORAL
Status: COMPLETED | OUTPATIENT
Start: 2025-03-01 | End: 2025-03-01

## 2025-03-01 RX ORDER — HYDROMORPHONE HYDROCHLORIDE 2 MG/ML
1 INJECTION, SOLUTION INTRAMUSCULAR; INTRAVENOUS; SUBCUTANEOUS
Status: COMPLETED | OUTPATIENT
Start: 2025-03-01 | End: 2025-03-01

## 2025-03-01 RX ORDER — MORPHINE SULFATE 4 MG/ML
1 INJECTION, SOLUTION INTRAMUSCULAR; INTRAVENOUS
Refills: 0 | Status: COMPLETED | OUTPATIENT
Start: 2025-03-01 | End: 2025-03-01

## 2025-03-01 RX ADMIN — DILTIAZEM HYDROCHLORIDE 15 MG: 5 INJECTION INTRAVENOUS at 08:03

## 2025-03-01 RX ADMIN — DILTIAZEM HYDROCHLORIDE 20 MG: 5 INJECTION INTRAVENOUS at 09:03

## 2025-03-01 RX ADMIN — HYDROMORPHONE HYDROCHLORIDE 1 MG: 2 INJECTION INTRAMUSCULAR; INTRAVENOUS; SUBCUTANEOUS at 05:03

## 2025-03-01 RX ADMIN — IOPAMIDOL 100 ML: 755 INJECTION, SOLUTION INTRAVENOUS at 05:03

## 2025-03-01 RX ADMIN — DEXTROSE MONOHYDRATE 5 MG/HR: 50 INJECTION, SOLUTION INTRAVENOUS at 09:03

## 2025-03-01 RX ADMIN — ALUMINUM HYDROXIDE, MAGNESIUM HYDROXIDE, AND SIMETHICONE 30 ML: 200; 200; 20 SUSPENSION ORAL at 02:03

## 2025-03-01 RX ADMIN — FUROSEMIDE 40 MG: 40 TABLET ORAL at 04:03

## 2025-03-01 RX ADMIN — LIDOCAINE HYDROCHLORIDE 15 ML: 20 SOLUTION ORAL at 02:03

## 2025-03-01 RX ADMIN — MORPHINE SULFATE 1 MG: 4 INJECTION INTRAVENOUS at 03:03

## 2025-03-01 RX ADMIN — ASPIRIN 325 MG ORAL TABLET 325 MG: 325 PILL ORAL at 08:03

## 2025-03-01 RX ADMIN — SODIUM CHLORIDE, POTASSIUM CHLORIDE, SODIUM LACTATE AND CALCIUM CHLORIDE 1000 ML: 600; 310; 30; 20 INJECTION, SOLUTION INTRAVENOUS at 02:03

## 2025-03-01 RX ADMIN — AZITHROMYCIN 500 MG: 250 TABLET, FILM COATED ORAL at 04:03

## 2025-03-01 NOTE — ED PROVIDER NOTES
Encounter Date: 3/1/2025       History     Chief Complaint   Patient presents with    Chest Pain     Pt c/o epigastric pain radiating to chest and back and having periods of sweating.      See mdm    The history is provided by the patient.     Review of patient's allergies indicates:   Allergen Reactions    Meperidine Shortness Of Breath    Sulfa (sulfonamide antibiotics) Shortness Of Breath     Past Medical History:   Diagnosis Date    Known health problems: none      Past Surgical History:   Procedure Laterality Date     SECTION       SECTION      LEFT HEART CATHETERIZATION N/A 2025    Procedure: Left heart cath;  Surgeon: Inna Turner MD;  Location: [facility] CATH LAB;  Service: Cardiology;  Laterality: N/A;    WRIST SURGERY       No family history on file.  Social History[1]  Review of Systems   Respiratory:  Negative for cough and shortness of breath.    Cardiovascular:  Positive for chest pain.   Gastrointestinal:  Positive for abdominal pain and nausea. Negative for constipation, diarrhea and vomiting.   All other systems reviewed and are negative.      Physical Exam     Initial Vitals [25 1418]   BP Pulse Resp Temp SpO2   (!) 109/94 (!) 111 18 99.9 °F (37.7 °C) 96 %      MAP       --         Physical Exam    Nursing note and vitals reviewed.  Constitutional: She is cooperative. She has a sickly appearance.   HENT:   Head: Normocephalic.   Right Ear: External ear normal.   Left Ear: External ear normal.   Nose: Nose normal. Mouth/Throat: Oropharynx is clear and moist.   Eyes: EOM are normal.   Neck:   Normal range of motion.  Cardiovascular:  Regular rhythm and intact distal pulses.           Pulmonary/Chest: She has no wheezes. She has no rhonchi. She has no rales. She exhibits no tenderness.   Abdominal: Abdomen is soft and flat. She exhibits no mass. Bowel sounds are increased. There is abdominal tenderness in the right upper quadrant and epigastric area. There is guarding.    Musculoskeletal:         General: Normal range of motion.      Cervical back: Normal range of motion.     Neurological: She is alert and oriented to person, place, and time. GCS score is 15. GCS eye subscore is 4. GCS verbal subscore is 5. GCS motor subscore is 6.   Skin: Skin is warm. Capillary refill takes less than 2 seconds.         ED Course   Procedures  Labs Reviewed   COMPREHENSIVE METABOLIC PANEL - Abnormal       Result Value    Sodium 137      Potassium 3.9      Chloride 106      CO2 20 (*)     Glucose 131 (*)     Blood Urea Nitrogen 9.0 (*)     Creatinine 0.76      Calcium 9.4      Protein Total 7.3      Albumin 3.5      Globulin 3.8 (*)     Albumin/Globulin Ratio 0.9 (*)     Bilirubin Total 1.0      ALP 65      ALT 16      AST 28      eGFR >60      Anion Gap 11.0      BUN/Creatinine Ratio 12     TROPONIN I - Abnormal    Troponin-I 1.387 (*)    B-TYPE NATRIURETIC PEPTIDE - Abnormal    Natriuretic Peptide 257.9 (*)    CBC WITH DIFFERENTIAL - Abnormal    WBC 15.31 (*)     RBC 4.27      Hgb 12.8      Hct 37.5      MCV 87.8      MCH 30.0      MCHC 34.1      RDW 13.7      Platelet 275      MPV 11.8 (*)     Neut % 84.4      Lymph % 3.6      Mono % 11.3      Eos % 0.0      Basophil % 0.2      Imm Grans % 0.5      Neut # 12.93 (*)     Lymph # 0.55 (*)     Mono # 1.73 (*)     Eos # 0.00      Baso # 0.03      Imm Gran # 0.07 (*)     NRBC% 0.0     TROPONIN I - Abnormal    Troponin-I 1.426 (*)    LIPASE - Normal    Lipase Level 21     LACTIC ACID, PLASMA - Normal    Lactic Acid Level 1.4     BLOOD CULTURE OLG   BLOOD CULTURE OLG   CBC W/ AUTO DIFFERENTIAL    Narrative:     The following orders were created for panel order CBC auto differential.  Procedure                               Abnormality         Status                     ---------                               -----------         ------                     CBC with Differential[[id number]]       Abnormal            Final result                 Please view  results for these tests on the individual orders.     EKG Readings: (Independently Interpreted)   Initial Reading: No STEMI. Rhythm: Sinus Tachycardia. Heart Rate: 160. Ectopy: No Ectopy. Conduction: Normal. ST Segments: Normal ST Segments. T Waves: Normal. Axis: Normal. Clinical Impression: Sinus Tachycardia and Atrial Flutter     ECG Results              EKG 12-lead (In process)        Collection Time Result Time QRS Duration OHS QTC Calculation    03/01/25 14:17:22 03/01/25 15:52:08 68 452                     In process by Interface, Lab In [facility] (03/01/25 15:52:16)                   Narrative:    Test Reason : R07.9,    Vent. Rate : 109 BPM     Atrial Rate : 109 BPM     P-R Int : 136 ms          QRS Dur :  68 ms      QT Int : 336 ms       P-R-T Axes :  17 145  65 degrees    QTcB Int : 452 ms    Sinus tachycardia  Right axis deviation  T wave abnormality, consider anterolateral ischemia  Abnormal ECG  When compared with ECG of 28-Feb-2025 09:48,  The axis Shifted right  ST no longer elevated in Inferior leads  Nonspecific T wave abnormality has replaced inverted T waves in Inferior  leads  T wave inversion less evident in Lateral leads  QT has shortened    Referred By:            Confirmed By:                                   Imaging Results              CTA Abdomen and Pelvis (Final result)  Result time 03/01/25 17:34:20      Final result by Nitin Camarena MD (03/01/25 17:34:20)                   Impression:      1. No evidence of mesenteric ischemia.  Patent mesenteric arterial vasculature.  2. Small bilateral pleural effusions.  3. Diffuse gallbladder wall thickening with small amount of high density material layering dependently.  Findings may be secondary to volume overload.  If there is concern for gallbladder pathology such is cholecystitis, recommend right upper quadrant ultrasound.  However, the CT findings are nonspecific.  4. Colonic diverticuli.  No diverticulitis.      Electronically signed  by: Nitin Camarena MD  Date:    03/01/2025  Time:    17:34               Narrative:    EXAMINATION:  CTA ABDOMEN AND PELVIS    CLINICAL HISTORY:  Acute abdominal pain.  Concern for mesenteric ischemia, acute    TECHNIQUE:  Axial CT images were obtained through the abdomen and pelvis before and after IV contrast.  100 mL Isovue 370..  Coronal and sagittal reconstructions submitted and interpreted.  Automated exposure control, dose radiation lowering technique, was utilized.  Maximum intensity pixel reconstructions were performed.    COMPARISON:  CT abdomen and pelvis without contrast from 02/26/5    FINDINGS:  CTA:    Heart size is within normal limits.  Nonaneurysmal and patent descending thoracic aorta.  There is a patent runoff from the descending thoracic aorta to the aortic bifurcation and from the aortic bifurcation to both groins.  There is a background of minimal to mild soft and calcified atherosclerotic plaque.    Widely patent celiac trunk, SMA and MARITA.  Patent renal arteries bilaterally.  Patent internal iliac arteries bilaterally.  Deep femoral arteries are widely patent bilaterally.    OTHER:    There are new partially visualized layering small bilateral effusions associated compressive atelectasis.  Diffuse gallbladder wall thickening is present.  There appears to be some high-density material within the gallbladder.    Liver appears within normal limits.  Spleen, pancreas, adrenal glands appear normal.  Right kidney is slightly low lying.  No hydronephrosis or nephrolithiasis.    The bowel is nonobstructed.  There are fluid-filled loops of small bowel in the left hemiabdomen.  Loops small bowel are nondilated.  Colonic diverticuli are present.  No mural thickening or pericolonic fat stranding to suggest diverticulitis.  Appendix is not seen.    Unremarkable bladder.  No free fluid.  No free air.  No suspicious osteolytic or osteoblastic lesion.                                       X-Ray Chest AP  Portable (Final result)  Result time 03/01/25 15:00:08      Final result by Isaac Mccarty MD (03/01/25 15:00:08)                   Impression:      Small right basilar infiltrate and associated small effusion are present.      Electronically signed by: Isaac Mccarty  Date:    03/01/2025  Time:    15:00               Narrative:    EXAMINATION:  XR CHEST AP PORTABLE    CLINICAL HISTORY:  Chest Pain;    TECHNIQUE:  Single frontal view of the chest was performed.    COMPARISON:  February 26, 2025    FINDINGS:  The cardiomediastinal silhouette is within normal limits.  Right basilar infiltrate and small effusion is noted.  The remainder of the lung fields appear clear.  No pneumothorax is seen                                       Medications   diltiaZEM 100 mg in D5W 100 mL infusion (ADD-vantage) (1 mg/hr Intravenous New Bag 3/1/25 2103)   lactated ringers bolus 1,000 mL (0 mLs Intravenous Stopped 3/1/25 1554)   aluminum-magnesium hydroxide-simethicone 200-200-20 mg/5 mL suspension 30 mL (30 mLs Oral Given 3/1/25 1453)     And   LIDOcaine viscous HCl 2% oral solution 15 mL (15 mLs Oral Given 3/1/25 1453)   morphine injection 1 mg (1 mg Intravenous Given 3/1/25 1534)   furosemide tablet 40 mg (40 mg Oral Given 3/1/25 1631)   azithromycin tablet 500 mg (500 mg Oral Given 3/1/25 1631)   HYDROmorphone (PF) injection 1 mg (1 mg Intravenous Given 3/1/25 1725)   iopamidoL (ISOVUE-370) injection 100 mL (100 mLs Intravenous Given 3/1/25 1717)   aspirin tablet 325 mg (325 mg Oral Given 3/1/25 2012)   diltiaZEM injection 15 mg (15 mg Intravenous Given 3/1/25 2101)   diltiaZEM injection 20 mg (20 mg Intravenous Given 3/1/25 2101)     Medical Decision Making  64 year old patient presents to the ED c/o abdominal pain that radiates to chest and left arm. The patient was seen in ED w/ abdominal pain/chest pain on 2/26/25. The patient did have elevated troponin in ED and was transferred and patient did have a heart cath  with stent placement at [facility].  The patient states that she has been having abdominal pains since stent placement and admits that she did have multiple EKG's during admission. The patient states that hospitalist advised her to f/u with PCP outpatient for GI referral. Patient states that she has not been able to eat or sleep at home. Patient states that with eating and laying flat she has increased abdominal pain with nausea but denies any vomiting or diarrhea. Patient denies any current dizziness or weakness.     Amount and/or Complexity of Data Reviewed  Labs: ordered.  Radiology: ordered.    Risk  OTC drugs.  Prescription drug management.               ED Course as of 03/01/25 2105   Sat Mar 01, 2025   1650 Patient evaluated. Patient troponin 1.387, trending down, last troponin 6.143 on 2/27.25.  BNP elevated, treated with 40mg lasix PO. Patient XR positive for right basilar pneumonia. Patient treated with azithromycin with blood cultures prior to administration. Discussed patient with Dr. Mcneil, cardiac consult advised due to recent cardiac stent. Did talk to Dr. Robin with cardiology and he recommends that patient be admitted to hospital where heart cath was performed with inpatient GI consult. Will put in transfer for patient. Will also order CTA abdomen to rule out ischemic gut.  [DL]   2036 Patient sitting quietly in bed and patient HR elevated to 160. Patient not in any acute distress. EKG ordered, Aflutter noted. 15mg cardiazem given to patient. Patient HR decreased to 80's. Patient still sitting quietly in bed w/o complaints.  [DL]      ED Course User Index  [DL] Tj Corral NP                           Clinical Impression:  Final diagnoses:  [R07.9] Chest pain  [J18.9] Pneumonia of right lower lobe due to infectious organism (Primary)          ED Disposition Condition    Transfer to Another Facility Stable                  Tj Corral NP  03/01/25 1905         [1]   Social  History  Tobacco Use    Smoking status: Never    Smokeless tobacco: Never   Substance Use Topics    Alcohol use: Yes     Comment: occ    Drug use: Not Currently        Tj Corral NP  03/01/25 3550

## 2025-03-01 NOTE — ED PROVIDER NOTES
Patient seen and examined.  She presents with upper abdominal pain that has been ongoing since undergoing LHC with stent placement on 2/26 at [facility].  She was discharged yesterday, still in pain.  She was reassured her pain was not due to cardiac causes.  She continues to have severe pain and states she cannot eat or sleep.  Work-up thus far is unremarkable.  Her troponin is elevated, but way down from its peak a few days ago.  She had CTA chest and CT abdomen/pelvis while in the hospital that were unremarkable.  She states all symptoms began after the heart cath.  Will obtain lactate and CTA abdomen/pelvis - perhaps she has developed some bowel ischemia?  Case discussed with Dr. Robin (cardiology) who recommends transfer to [facility] for cardiology and GI evaluation.  PFC order entered.    I am in agreement with Mr. Corral' documentation and plan of care and I assume responsibility for said plan.     Nitin Mcneil MD  03/01/25 9929

## 2025-03-02 LAB
ALBUMIN SERPL-MCNC: 3.1 G/DL (ref 3.4–4.8)
ALBUMIN/GLOB SERPL: 1.2 RATIO (ref 1.1–2)
ALP SERPL-CCNC: 77 UNIT/L (ref 40–150)
ALT SERPL-CCNC: 19 UNIT/L (ref 0–55)
ANION GAP SERPL CALC-SCNC: 10 MEQ/L
APICAL FOUR CHAMBER EJECTION FRACTION: 39 %
APICAL TWO CHAMBER EJECTION FRACTION: 42 %
AST SERPL-CCNC: 25 UNIT/L (ref 5–34)
B PERT.PT PRMT NPH QL NAA+NON-PROBE: NOT DETECTED
BASOPHILS # BLD AUTO: 0.02 X10(3)/MCL
BASOPHILS NFR BLD AUTO: 0.2 %
BILIRUB SERPL-MCNC: 0.9 MG/DL
BSA FOR ECHO PROCEDURE: 1.54 M2
BUN SERPL-MCNC: 9.8 MG/DL (ref 9.8–20.1)
C PNEUM DNA NPH QL NAA+NON-PROBE: NOT DETECTED
CALCIUM SERPL-MCNC: 8.4 MG/DL (ref 8.4–10.2)
CHLORIDE SERPL-SCNC: 104 MMOL/L (ref 98–107)
CO2 SERPL-SCNC: 21 MMOL/L (ref 23–31)
CREAT SERPL-MCNC: 0.78 MG/DL (ref 0.55–1.02)
CREAT/UREA NIT SERPL: 13
CV ECHO LV RWT: 0.43 CM
ECHO LV POSTERIOR WALL: 0.9 CM (ref 0.6–1.1)
EOSINOPHIL # BLD AUTO: 0.01 X10(3)/MCL (ref 0–0.9)
EOSINOPHIL NFR BLD AUTO: 0.1 %
ERYTHROCYTE [DISTWIDTH] IN BLOOD BY AUTOMATED COUNT: 13.5 % (ref 11.5–17)
FLUAV AG UPPER RESP QL IA.RAPID: NOT DETECTED
FLUBV AG UPPER RESP QL IA.RAPID: NOT DETECTED
FRACTIONAL SHORTENING: 33.3 % (ref 28–44)
GFR SERPLBLD CREATININE-BSD FMLA CKD-EPI: >60 ML/MIN/1.73/M2
GLOBULIN SER-MCNC: 2.6 GM/DL (ref 2.4–3.5)
GLUCOSE SERPL-MCNC: 130 MG/DL (ref 82–115)
HADV DNA NPH QL NAA+NON-PROBE: NOT DETECTED
HCOV 229E RNA NPH QL NAA+NON-PROBE: NOT DETECTED
HCOV HKU1 RNA NPH QL NAA+NON-PROBE: NOT DETECTED
HCOV NL63 RNA NPH QL NAA+NON-PROBE: NOT DETECTED
HCOV OC43 RNA NPH QL NAA+NON-PROBE: NOT DETECTED
HCT VFR BLD AUTO: 33.8 % (ref 37–47)
HGB BLD-MCNC: 10.9 G/DL (ref 12–16)
HMPV RNA NPH QL NAA+NON-PROBE: NOT DETECTED
HPIV1 RNA NPH QL NAA+NON-PROBE: NOT DETECTED
HPIV2 RNA NPH QL NAA+NON-PROBE: NOT DETECTED
HPIV3 RNA NPH QL NAA+NON-PROBE: NOT DETECTED
HPIV4 RNA NPH QL NAA+NON-PROBE: NOT DETECTED
IMM GRANULOCYTES # BLD AUTO: 0.04 X10(3)/MCL (ref 0–0.04)
IMM GRANULOCYTES NFR BLD AUTO: 0.4 %
INTERVENTRICULAR SEPTUM: 1.1 CM (ref 0.6–1.1)
LEFT INTERNAL DIMENSION IN SYSTOLE: 2.8 CM (ref 2.1–4)
LEFT VENTRICLE DIASTOLIC VOLUME INDEX: 50 ML/M2
LEFT VENTRICLE DIASTOLIC VOLUME: 77 ML
LEFT VENTRICLE END DIASTOLIC VOLUME APICAL 2 CHAMBER: 83.4 ML
LEFT VENTRICLE END DIASTOLIC VOLUME APICAL 4 CHAMBER: 77.7 ML
LEFT VENTRICLE MASS INDEX: 89.1 G/M2
LEFT VENTRICLE SYSTOLIC VOLUME INDEX: 18.8 ML/M2
LEFT VENTRICLE SYSTOLIC VOLUME: 29 ML
LEFT VENTRICULAR INTERNAL DIMENSION IN DIASTOLE: 4.2 CM (ref 3.5–6)
LEFT VENTRICULAR MASS: 137.2 G
LVED V (TEICH): 77.3 ML
LVES V (TEICH): 29.3 ML
LYMPHOCYTES # BLD AUTO: 0.44 X10(3)/MCL (ref 0.6–4.6)
LYMPHOCYTES NFR BLD AUTO: 3.9 %
M PNEUMO DNA NPH QL NAA+NON-PROBE: NOT DETECTED
MAGNESIUM SERPL-MCNC: 1.8 MG/DL (ref 1.6–2.6)
MCH RBC QN AUTO: 29.4 PG (ref 27–31)
MCHC RBC AUTO-ENTMCNC: 32.2 G/DL (ref 33–36)
MCV RBC AUTO: 91.1 FL (ref 80–94)
MONOCYTES # BLD AUTO: 1.15 X10(3)/MCL (ref 0.1–1.3)
MONOCYTES NFR BLD AUTO: 10.2 %
MRSA PCR SCRN (OHS): NOT DETECTED
NEUTROPHILS # BLD AUTO: 9.67 X10(3)/MCL (ref 2.1–9.2)
NEUTROPHILS NFR BLD AUTO: 85.2 %
NRBC BLD AUTO-RTO: 0 %
OHS LV EJECTION FRACTION SIMPSONS BIPLANE MOD: 40 %
PLATELET # BLD AUTO: 235 X10(3)/MCL (ref 130–400)
PMV BLD AUTO: 12.3 FL (ref 7.4–10.4)
POTASSIUM SERPL-SCNC: 3.3 MMOL/L (ref 3.5–5.1)
PROT SERPL-MCNC: 5.7 GM/DL (ref 5.8–7.6)
RBC # BLD AUTO: 3.71 X10(6)/MCL (ref 4.2–5.4)
RSV A 5' UTR RNA NPH QL NAA+PROBE: NOT DETECTED
RSV RNA NPH QL NAA+NON-PROBE: NOT DETECTED
RV+EV RNA NPH QL NAA+NON-PROBE: NOT DETECTED
SARS-COV-2 RNA RESP QL NAA+PROBE: NOT DETECTED
SODIUM SERPL-SCNC: 135 MMOL/L (ref 136–145)
TROPONIN I SERPL-MCNC: 0.91 NG/ML (ref 0–0.04)
TROPONIN I SERPL-MCNC: 0.92 NG/ML (ref 0–0.04)
TROPONIN I SERPL-MCNC: 1.26 NG/ML (ref 0–0.04)
WBC # BLD AUTO: 11.33 X10(3)/MCL (ref 4.5–11.5)
Z-SCORE OF LEFT VENTRICULAR DIMENSION IN END DIASTOLE: -0.63
Z-SCORE OF LEFT VENTRICULAR DIMENSION IN END SYSTOLE: 0.06

## 2025-03-02 PROCEDURE — 63600175 PHARM REV CODE 636 W HCPCS: Performed by: NURSE PRACTITIONER

## 2025-03-02 PROCEDURE — 36415 COLL VENOUS BLD VENIPUNCTURE: CPT

## 2025-03-02 PROCEDURE — 80053 COMPREHEN METABOLIC PANEL: CPT

## 2025-03-02 PROCEDURE — 83735 ASSAY OF MAGNESIUM: CPT

## 2025-03-02 PROCEDURE — 25000003 PHARM REV CODE 250

## 2025-03-02 PROCEDURE — 63600175 PHARM REV CODE 636 W HCPCS: Performed by: INTERNAL MEDICINE

## 2025-03-02 PROCEDURE — 84443 ASSAY THYROID STIM HORMONE: CPT | Performed by: INTERNAL MEDICINE

## 2025-03-02 PROCEDURE — 25000003 PHARM REV CODE 250: Performed by: NURSE PRACTITIONER

## 2025-03-02 PROCEDURE — 63600175 PHARM REV CODE 636 W HCPCS

## 2025-03-02 PROCEDURE — 87641 MR-STAPH DNA AMP PROBE: CPT

## 2025-03-02 PROCEDURE — 0241U COVID/RSV/FLU A&B PCR: CPT

## 2025-03-02 PROCEDURE — 93010 ELECTROCARDIOGRAM REPORT: CPT | Mod: ,,, | Performed by: INTERNAL MEDICINE

## 2025-03-02 PROCEDURE — 93005 ELECTROCARDIOGRAM TRACING: CPT

## 2025-03-02 PROCEDURE — 85025 COMPLETE CBC W/AUTO DIFF WBC: CPT

## 2025-03-02 PROCEDURE — 84484 ASSAY OF TROPONIN QUANT: CPT

## 2025-03-02 PROCEDURE — 87486 CHLMYD PNEUM DNA AMP PROBE: CPT

## 2025-03-02 PROCEDURE — 25000003 PHARM REV CODE 250: Performed by: INTERNAL MEDICINE

## 2025-03-02 PROCEDURE — 21400001 HC TELEMETRY ROOM

## 2025-03-02 RX ORDER — POLYETHYLENE GLYCOL 3350 17 G/17G
17 POWDER, FOR SOLUTION ORAL 2 TIMES DAILY PRN
Status: DISCONTINUED | OUTPATIENT
Start: 2025-03-02 | End: 2025-03-05

## 2025-03-02 RX ORDER — GLUCAGON 1 MG
1 KIT INJECTION
Status: DISCONTINUED | OUTPATIENT
Start: 2025-03-02 | End: 2025-03-06 | Stop reason: HOSPADM

## 2025-03-02 RX ORDER — SODIUM CHLORIDE 0.9 % (FLUSH) 0.9 %
10 SYRINGE (ML) INJECTION
Status: DISCONTINUED | OUTPATIENT
Start: 2025-03-02 | End: 2025-03-06 | Stop reason: HOSPADM

## 2025-03-02 RX ORDER — IBUPROFEN 200 MG
24 TABLET ORAL
Status: DISCONTINUED | OUTPATIENT
Start: 2025-03-02 | End: 2025-03-06 | Stop reason: HOSPADM

## 2025-03-02 RX ORDER — POTASSIUM CHLORIDE 20 MEQ/1
20 TABLET, EXTENDED RELEASE ORAL ONCE
Status: COMPLETED | OUTPATIENT
Start: 2025-03-02 | End: 2025-03-02

## 2025-03-02 RX ORDER — ENOXAPARIN SODIUM 100 MG/ML
30 INJECTION SUBCUTANEOUS EVERY 24 HOURS
Status: DISCONTINUED | OUTPATIENT
Start: 2025-03-02 | End: 2025-03-02

## 2025-03-02 RX ORDER — POTASSIUM CHLORIDE 20 MEQ/1
40 TABLET, EXTENDED RELEASE ORAL ONCE
Status: COMPLETED | OUTPATIENT
Start: 2025-03-02 | End: 2025-03-02

## 2025-03-02 RX ORDER — ONDANSETRON HYDROCHLORIDE 2 MG/ML
4 INJECTION, SOLUTION INTRAVENOUS EVERY 6 HOURS PRN
Status: DISCONTINUED | OUTPATIENT
Start: 2025-03-02 | End: 2025-03-06 | Stop reason: HOSPADM

## 2025-03-02 RX ORDER — FUROSEMIDE 10 MG/ML
40 INJECTION INTRAMUSCULAR; INTRAVENOUS ONCE
Status: COMPLETED | OUTPATIENT
Start: 2025-03-02 | End: 2025-03-02

## 2025-03-02 RX ORDER — ACETAMINOPHEN 10 MG/ML
1000 INJECTION, SOLUTION INTRAVENOUS ONCE
Status: COMPLETED | OUTPATIENT
Start: 2025-03-02 | End: 2025-03-03

## 2025-03-02 RX ORDER — METOPROLOL SUCCINATE 50 MG/1
100 TABLET, EXTENDED RELEASE ORAL DAILY
Status: DISCONTINUED | OUTPATIENT
Start: 2025-03-02 | End: 2025-03-04

## 2025-03-02 RX ORDER — ALUMINUM HYDROXIDE, MAGNESIUM HYDROXIDE, AND SIMETHICONE 1200; 120; 1200 MG/30ML; MG/30ML; MG/30ML
30 SUSPENSION ORAL 4 TIMES DAILY PRN
Status: DISCONTINUED | OUTPATIENT
Start: 2025-03-02 | End: 2025-03-05

## 2025-03-02 RX ORDER — ATORVASTATIN CALCIUM 40 MG/1
80 TABLET, FILM COATED ORAL DAILY
Status: DISCONTINUED | OUTPATIENT
Start: 2025-03-03 | End: 2025-03-06 | Stop reason: HOSPADM

## 2025-03-02 RX ORDER — TALC
6 POWDER (GRAM) TOPICAL NIGHTLY PRN
Status: DISCONTINUED | OUTPATIENT
Start: 2025-03-02 | End: 2025-03-06 | Stop reason: HOSPADM

## 2025-03-02 RX ORDER — BISACODYL 10 MG/1
10 SUPPOSITORY RECTAL DAILY PRN
Status: DISCONTINUED | OUTPATIENT
Start: 2025-03-02 | End: 2025-03-05

## 2025-03-02 RX ORDER — PANTOPRAZOLE SODIUM 40 MG/10ML
40 INJECTION, POWDER, LYOPHILIZED, FOR SOLUTION INTRAVENOUS 2 TIMES DAILY
Status: DISCONTINUED | OUTPATIENT
Start: 2025-03-02 | End: 2025-03-06 | Stop reason: HOSPADM

## 2025-03-02 RX ORDER — ASPIRIN 81 MG/1
81 TABLET ORAL DAILY
Status: DISCONTINUED | OUTPATIENT
Start: 2025-03-03 | End: 2025-03-04

## 2025-03-02 RX ORDER — ENOXAPARIN SODIUM 100 MG/ML
1 INJECTION SUBCUTANEOUS EVERY 12 HOURS
Status: DISCONTINUED | OUTPATIENT
Start: 2025-03-02 | End: 2025-03-05

## 2025-03-02 RX ORDER — ENOXAPARIN SODIUM 100 MG/ML
1 INJECTION SUBCUTANEOUS 2 TIMES DAILY
Status: DISCONTINUED | OUTPATIENT
Start: 2025-03-02 | End: 2025-03-02

## 2025-03-02 RX ORDER — IBUPROFEN 200 MG
16 TABLET ORAL
Status: DISCONTINUED | OUTPATIENT
Start: 2025-03-02 | End: 2025-03-06 | Stop reason: HOSPADM

## 2025-03-02 RX ORDER — METOPROLOL TARTRATE 1 MG/ML
5 INJECTION, SOLUTION INTRAVENOUS EVERY 5 MIN PRN
Status: COMPLETED | OUTPATIENT
Start: 2025-03-02 | End: 2025-03-02

## 2025-03-02 RX ADMIN — PANTOPRAZOLE SODIUM 40 MG: 40 INJECTION, POWDER, LYOPHILIZED, FOR SOLUTION INTRAVENOUS at 10:03

## 2025-03-02 RX ADMIN — METOPROLOL TARTRATE 5 MG: 1 INJECTION, SOLUTION INTRAVENOUS at 06:03

## 2025-03-02 RX ADMIN — POTASSIUM CHLORIDE 20 MEQ: 1500 TABLET, EXTENDED RELEASE ORAL at 05:03

## 2025-03-02 RX ADMIN — TICAGRELOR 90 MG: 90 TABLET ORAL at 08:03

## 2025-03-02 RX ADMIN — METOPROLOL TARTRATE 5 MG: 1 INJECTION, SOLUTION INTRAVENOUS at 10:03

## 2025-03-02 RX ADMIN — PIPERACILLIN SODIUM AND TAZOBACTAM SODIUM 4.5 G: 4; .5 INJECTION, POWDER, LYOPHILIZED, FOR SOLUTION INTRAVENOUS at 03:03

## 2025-03-02 RX ADMIN — POTASSIUM CHLORIDE 40 MEQ: 1500 TABLET, EXTENDED RELEASE ORAL at 12:03

## 2025-03-02 RX ADMIN — POLYETHYLENE GLYCOL 3350 17 G: 17 POWDER, FOR SOLUTION ORAL at 01:03

## 2025-03-02 RX ADMIN — PIPERACILLIN SODIUM AND TAZOBACTAM SODIUM 4.5 G: 4; .5 INJECTION, POWDER, LYOPHILIZED, FOR SOLUTION INTRAVENOUS at 08:03

## 2025-03-02 RX ADMIN — DILTIAZEM HYDROCHLORIDE 10 MG/HR: 5 INJECTION, SOLUTION INTRAVENOUS at 01:03

## 2025-03-02 RX ADMIN — METOPROLOL SUCCINATE 100 MG: 50 TABLET, EXTENDED RELEASE ORAL at 12:03

## 2025-03-02 RX ADMIN — ONDANSETRON 4 MG: 2 INJECTION INTRAMUSCULAR; INTRAVENOUS at 09:03

## 2025-03-02 RX ADMIN — VANCOMYCIN HYDROCHLORIDE 1250 MG: 1.25 INJECTION, POWDER, LYOPHILIZED, FOR SOLUTION INTRAVENOUS at 01:03

## 2025-03-02 RX ADMIN — TICAGRELOR 90 MG: 90 TABLET ORAL at 02:03

## 2025-03-02 RX ADMIN — TICAGRELOR 90 MG: 90 TABLET ORAL at 09:03

## 2025-03-02 RX ADMIN — PANTOPRAZOLE SODIUM 40 MG: 40 INJECTION, POWDER, LYOPHILIZED, FOR SOLUTION INTRAVENOUS at 09:03

## 2025-03-02 RX ADMIN — ENOXAPARIN SODIUM 50 MG: 60 INJECTION SUBCUTANEOUS at 02:03

## 2025-03-02 RX ADMIN — ENOXAPARIN SODIUM 50 MG: 60 INJECTION SUBCUTANEOUS at 09:03

## 2025-03-02 RX ADMIN — FUROSEMIDE 40 MG: 10 INJECTION, SOLUTION INTRAVENOUS at 12:03

## 2025-03-02 NOTE — NURSING
3/2/25: Patient spontaneously converted to a-fib RVR -190 at approx 0530 while on diltiazem 10mg/hr. Patient was sleeping at the time. Patient's pain returned, epigastric radiating to right shoulder. Vitals stable. Diltiazem increased to 15mg/ hr.  2 doses of 5mg metoprolol PRN given. Stat EKG completed, a-flutter. Paper copy in chart. Paper copy of EKG from [facility] also in chart, also showing a-flutter. Patient converted to NSR approx 0700, HR 80s. Jerry, RN 3/2/25 0730

## 2025-03-02 NOTE — CONSULTS
Inpatient consult to Cardiology  Consult performed by: Tana Phillips FNP  Consult ordered by: Lori Maravilla FNP  Reason for consult: AF [facility]    Cardiology  Consult Note    Patient Name: Hien Ryo  MRN: [id number]  Admission Date: 3/1/2025  Hospital Length of Stay: 1 days  Code Status: Full Code   Attending Provider: Nitin Nash MD   Consulting Provider: ASHLEIGH Morrow  Primary Care Physician: Deana Jaimes FNP  Principal Problem:<principal problem not specified>    Patient information was obtained from patient, past medical records, ER records, and primary team.     Subjective:   Chief Complaint/Reason for Consult: Epigastric Pain & AFL    HPI:   Ms. Roy is a 64 year old female, known to [facility] through recent hospitalization, who presented to the hospital with upper abdominal pain. Patient with recent history of presenting NSTEMI where she underwent LAD Stenting and found to have Takotsubo Cardiomyopathy with EF ~ 45%. Patient was placed on HF Medications including DAPT Post PCI and discharged on 25. Patient reported that abdominal discomfort worsened at home to the point where she could not eat or sleep. She had CTA chest and CT abdomen/pelvis while in the hospital that were  Unremarkable. Lab work up is stable, although troponin value is elevated but down from recent peak (prob due to recent PCI). Lactate normal. EKG revealed atrial flutter- new diagnosis. Respiratory panel negative. Cardizem infusion was started for acute HR Control. CXR revealed Small right basilar infiltrate and associated small effusion are present. Patient admitted to Hospital Medicine Service. [facility] consulted for cardiac evaluation/management.     PMH: NSTEMI, CAD/PCI LAD, Takotsubo Cardiomyopathy, Anxiety  PSH: , Wrist Surgery, LHC/PCI  Family History: None  Social History: Tobacco- Negative, Alcohol- Occasional Use, Substance Abuse- Negative     Previous  Cardiac Diagnostics:   CT Angiogram Abdomen & Pelvis (3.1.25):  No evidence of mesenteric ischemia.  Patent mesenteric arterial vasculature.  Small bilateral pleural effusions.  Diffuse gallbladder wall thickening with small amount of high density material layering dependently.  Findings may be secondary to volume overload.  If there is concern for gallbladder pathology such is cholecystitis, recommend right upper quadrant ultrasound.  However, the CT findings are nonspecific.  Colonic diverticuli.  No diverticulitis.    Echocardiogram (2.27.25):  Left Ventricle: The left ventricle is normal in size. Normal wall thickness. There is mildly reduced systolic function with a visually estimated ejection fraction of 40 - 45%. Grade I diastolic dysfunction. [place] is akinetic and resembles takosubo syndrome  Right Ventricle: The right ventricle is normal in size. Systolic function is normal. TAPSE is 1.68 cm.  Mitral Valve: Mildly thickened leaflets. There is moderate regurgitation.  Tricuspid Valve: There is mild regurgitation.  Pericardium: There is no pericardial effusion.     LHC with PCI (2.27.25):  Coronary findings:  Dominance: right   Left main:  Patent vessel that bifurcates into a LAD and circumflex system.  Left anterior descending artery:  Large type 2 vessel with diffuse proximal to mid segment stenosis up to 80% by IVUS.  1st diagonal is a moderate-sized vessel that originates proximal to the stenosis.  Circumflex artery:  Patent nondominant vessel that gives rise to a large obtuse marginal-1.  The AV groove artery arises before the obtuse marginal and terminates in a small terminal OM-2  Right coronary artery:  Patent dominant vessel terminates in a PDA and a moderate-sized JHONATHAN  Left ventriculography:  The ejection fraction was 40% with apical ballooning suggestive of takotsubo. The EDP was 17 mmHg.   Intervention: Proximal to mid LAD 80% stenosis reduced to 0% following IVUS guided TIEN using a 3.5 x 34 marcela  post dilated proximally with a 4 x 6 NC.  The moderate-sized diagonal was placed in stent half-way with KAYE 3 flow throughout the vessel at the completion of the procedure.      CT Angiogram Chest (2.26.25):  FINDINGS:  There is no central pulmonary arterial filling defect to suggest pulmonary thromboembolism.  Cardiac chamber sizes are within normal limits.  There is no pleural or pericardial fluid.  There are no enlarged intrathoracic lymph nodes.  There are subcentimeter bilateral hilar short axis lymph nodes.  There is no dense lobar consolidation or suspicious focal pulmonary parenchymal finding.  There is no acute finding in the included abdomen. There is no acute finding in the included body wall. There is no acute finding in the included base of the neck.  Impression:  No convincing evidence of pulmonary thromboembolism.  There are nonspecific subcentimeter bilateral hilar short axis lymph nodes.    Review of patient's allergies indicates:   Allergen Reactions    Meperidine Shortness Of Breath    Sulfa (sulfonamide antibiotics) Shortness Of Breath     Current Facility-Administered Medications on File Prior to Encounter   Medication    [COMPLETED] aluminum-magnesium hydroxide-simethicone 200-200-20 mg/5 mL suspension 30 mL    And    [COMPLETED] LIDOcaine viscous HCl 2% oral solution 15 mL    [COMPLETED] aspirin tablet 325 mg    [COMPLETED] azithromycin tablet 500 mg    [COMPLETED] diltiaZEM injection 15 mg    [COMPLETED] diltiaZEM injection 20 mg    [COMPLETED] furosemide tablet 40 mg    [COMPLETED] HYDROmorphone (PF) injection 1 mg    [COMPLETED] iopamidoL (ISOVUE-370) injection 100 mL    [COMPLETED] lactated ringers bolus 1,000 mL    [COMPLETED] morphine injection 1 mg    [DISCONTINUED] diltiaZEM 100 mg in D5W 100 mL infusion (ADD-vantage)     Current Outpatient Medications on File Prior to Encounter   Medication Sig    aspirin (ECOTRIN) 81 MG EC tablet Take 1 tablet (81 mg total) by mouth once daily.     atorvastatin (LIPITOR) 80 MG tablet Take 1 tablet (80 mg total) by mouth once daily.    losartan (COZAAR) 25 MG tablet Take 0.5 tablets (12.5 mg total) by mouth once daily.    metoprolol succinate (TOPROL-XL) 50 MG 24 hr tablet Take 1 tablet (50 mg total) by mouth once daily.    nitroGLYCERIN (NITROSTAT) 0.4 MG SL tablet Place 1 tablet (0.4 mg total) under the tongue every 5 (five) minutes as needed for Chest pain.    pantoprazole (PROTONIX) 40 MG tablet Take 1 tablet (40 mg total) by mouth once daily.    ticagrelor (BRILINTA) 90 mg tablet Take 1 tablet (90 mg total) by mouth 2 (two) times daily.     Review of Systems   Respiratory:  Negative for chest tightness and shortness of breath.    Cardiovascular:  Negative for chest pain.   Gastrointestinal:         Epigastric Discomfort.   All other systems reviewed and are negative.    Objective:     Vital Signs (Most Recent):  Temp: 98.4 °F (36.9 °C) (03/02/25 0453)  Pulse: (!) 113 (03/02/25 0638)  Resp: 18 (03/02/25 0530)  BP: 107/68 (03/02/25 0638)  SpO2: 96 % (03/02/25 0638) Vital Signs (24h Range):  Temp:  [98.4 °F (36.9 °C)-99.9 °F (37.7 °C)] 98.4 °F (36.9 °C)  Pulse:  [] 113  Resp:  [12-22] 18  SpO2:  [91 %-98 %] 96 %  BP: ()/(61-98) 107/68   Weight: 53.1 kg (117 lb)  Body mass index is 20.73 kg/m².  SpO2: 96 %     No intake or output data in the 24 hours ending 03/02/25 0711  Lines/Drains/Airways       Peripheral Intravenous Line  Duration                  Peripheral IV - Single Lumen 03/01/25 1700 20 G Anterior;Left Forearm <1 day         Peripheral IV - Single Lumen 03/02/25 0144 22 G No Anterior;Left;Proximal Forearm <1 day                  Significant Labs:   Chemistries:   Recent Labs   Lab 02/26/25  1705 02/26/25  2043 02/27/25  0637 02/28/25  0307 03/01/25  1434 03/01/25  1727 03/02/25  0051    137 140 139 137  --   --    K 4.0 3.4* 3.9 3.9 3.9  --   --     105 106 109* 106  --   --    CO2 22* 23 23 21* 20*  --   --    BUN 14.0 11.5  "11.5 8.6* 9.0*  --   --    CREATININE 0.89 0.84 0.95 0.79 0.76  --   --    CALCIUM 10.5* 8.9 9.5 8.3* 9.4  --   --    BILITOT 0.7 0.6 0.7 0.6 1.0  --   --    ALKPHOS 60 49 55 45 65  --   --    ALT 12 10 13 11 16  --   --    AST 56* 43* 49* 40* 28  --   --    GLUCOSE 122* 104 95 97 131*  --   --    MG  --   --  1.80 2.20  --   --   --    TROPONINI 9.409* 8.370* 6.143*  --  1.387* 1.426* 1.265*        CBC/Anemia Labs: Coags:    Recent Labs   Lab 02/27/25  0637 02/28/25  0307 03/01/25  1433   WBC 7.57 11.90* 15.31*   HGB 13.4 12.3 12.8   HCT 41.2 38.1 37.5    230 275   MCV 90.4 91.4 87.8   RDW 13.6 13.5 13.7    No results for input(s): "PT", "INR", "APTT" in the last 168 hours.       EKG:       Telemetry:  SR    Physical Exam  Vitals and nursing note reviewed.   Constitutional:       Appearance: Normal appearance.   HENT:      Head: Normocephalic.      Mouth/Throat:      Mouth: Mucous membranes are moist.      Pharynx: Oropharynx is clear.   Cardiovascular:      Rate and Rhythm: Normal rate and regular rhythm.      Heart sounds: Normal heart sounds.   Pulmonary:      Effort: Pulmonary effort is normal. No respiratory distress.      Breath sounds: Normal breath sounds. No wheezing or rales.   Abdominal:      Palpations: Abdomen is soft.   Musculoskeletal:      Right lower leg: No edema.      Left lower leg: No edema.   Skin:     General: Skin is warm and dry.   Neurological:      General: No focal deficit present.      Mental Status: She is alert and oriented to person, place, and time. Mental status is at baseline.   Psychiatric:         Behavior: Behavior normal.       Home Medications:   Medications Ordered Prior to Encounter[1]  Current Schedule Inpatient Medications:   [START ON 3/3/2025] aspirin  81 mg Oral Daily    [START ON 3/3/2025] atorvastatin  80 mg Oral Daily    enoxparin  1 mg/kg Subcutaneous BID    piperacillin-tazobactam (Zosyn) IV (PEDS and ADULTS) (extended infusion is not appropriate)  4.5 g " Intravenous Q8H    ticagrelor  90 mg Oral BID     Continuous Infusions:   dilTIAZem  0-15 mg/hr Intravenous Continuous 15 mL/hr at 03/02/25 0552 15 mg/hr at 03/02/25 0552     Assessment:   Epigastric Pain of Unclear Etiology  Atrial Flutter (New Onset)- Now SR    - [id number]: 4    - On FD Lovenox for Stroke Risk Reduction  NSTEMI Type IV Post Recent LAD Stenting  Recent NSTEMI Type I (Anterior Wall) (2.27.25)    - CT Angiogram Chest (2.26.25): no central pulmonary arterial filling defect to suggest pulmonary thromboembolism.  CAD    - Status Post PCI/TIEN Placement Proximal to Mid LAD 80% Lesion (2.27.25)  ICMO/Takotsubo Cardiomyopathy    - EF 40-45% with [place] is akinetic and resembles takosubo syndrome (2.27.25)  Acute Combined Sysotlic & Diastolic HF     - EF 40-45% with Grade I Diastolic Dysfunction  VHD    - MR: Moderate   Possible Pneumonia    - On Antibiotics  Diverticulosis Coli (CT Imaging)  Leukocytosis (Resolved)  Anxiety  No History of GI Bleed    Plan:   Advance Toprol XL to 100 Mg PO Daily. Give this Morning, & Wean Cardizem Infusion off once Oral Beta Blocker given.   Continue DAPT (Aspirin 81 Mg Daily & Brilinta 90 Mg PO BID) Re: LAD Stenting (2.27.25)  Continue FD Lovenox for Stroke Risk Reduction  Give Lasix 40 mg IVP x 1 Dose (Received 1 Dose 40 mg 3.1.25 1630 Hours)  Replete Potassium   Abdominal Workup in Progress  Plan initiation of Eliquis 5 Mg PO BID & Brilinta 90 Mg PO BID at discharge (No Aspirin to avoid triple therapy)    Thank you for your consult.     Tana Phillips, [facility]  Cardiology  [facility]   Physician addendum:  I have seen and examined this patient as a split-shared visit with the ANETA d/t complicated medical management of above problems written in assessment and high acuity requiring physician expertise in medical decision-making. I performed the substantive portion of the history and exam. Above medical decision-making is also formulated by me.    Cardiovascular exam:   S1, S2  Lungs:  fine crackles at bases.  Extremities:  + edema bilaterally    Plan:  Medications as above.  Continue supportive therapy.     Danny White MD  Cardiologist         [1]   Current Facility-Administered Medications on File Prior to Encounter   Medication Dose Route Frequency Provider Last Rate Last Admin    [COMPLETED] aluminum-magnesium hydroxide-simethicone 200-200-20 mg/5 mL suspension 30 mL  30 mL Oral Once Tj Corral NP   30 mL at 03/01/25 1453    And    [COMPLETED] LIDOcaine viscous HCl 2% oral solution 15 mL  15 mL Oral Once Tj Corral NP   15 mL at 03/01/25 1453    [COMPLETED] aspirin tablet 325 mg  325 mg Oral ED 1 Time Chava Odonnell, DO   325 mg at 03/01/25 2012    [COMPLETED] azithromycin tablet 500 mg  500 mg Oral ED 1 Time Tj Corral NP   500 mg at 03/01/25 1631    [COMPLETED] diltiaZEM injection 15 mg  15 mg Intravenous ED 1 Time Tj Corral NP   15 mg at 03/01/25 2050    [COMPLETED] diltiaZEM injection 20 mg  20 mg Intravenous ED 1 Time Chava Odonnell, DO   20 mg at 03/01/25 2101    [COMPLETED] furosemide tablet 40 mg  40 mg Oral ED 1 Time Tj Corral NP   40 mg at 03/01/25 1631    [COMPLETED] HYDROmorphone (PF) injection 1 mg  1 mg Intravenous ED 1 Time Tj Corral NP   1 mg at 03/01/25 1725    [COMPLETED] iopamidoL (ISOVUE-370) injection 100 mL  100 mL Intravenous ONCE PRN Nitin Mcneil MD   100 mL at 03/01/25 1717    [COMPLETED] lactated ringers bolus 1,000 mL  1,000 mL Intravenous ED 1 Time Tj Corral NP   Stopped at 03/01/25 1554    [COMPLETED] morphine injection 1 mg  1 mg Intravenous ED 1 Time Tj Corral NP   1 mg at 03/01/25 1534    [DISCONTINUED] diltiaZEM 100 mg in D5W 100 mL infusion (ADD-vantage)  0-15 mg/hr Intravenous Continuous Chava Odonnell, DO 10 mL/hr at 03/01/25 2204 10 mg/hr at 03/01/25 2204     Current Outpatient Medications on File Prior to Encounter   Medication Sig Dispense Refill    aspirin (ECOTRIN) 81 MG EC tablet  Take 1 tablet (81 mg total) by mouth once daily. 90 tablet 3    atorvastatin (LIPITOR) 80 MG tablet Take 1 tablet (80 mg total) by mouth once daily. 90 tablet 1    losartan (COZAAR) 25 MG tablet Take 0.5 tablets (12.5 mg total) by mouth once daily. 45 tablet 3    metoprolol succinate (TOPROL-XL) 50 MG 24 hr tablet Take 1 tablet (50 mg total) by mouth once daily. 90 tablet 3    nitroGLYCERIN (NITROSTAT) 0.4 MG SL tablet Place 1 tablet (0.4 mg total) under the tongue every 5 (five) minutes as needed for Chest pain. 10 tablet 1    pantoprazole (PROTONIX) 40 MG tablet Take 1 tablet (40 mg total) by mouth once daily. 30 tablet 0    ticagrelor (BRILINTA) 90 mg tablet Take 1 tablet (90 mg total) by mouth 2 (two) times daily. 60 tablet 11

## 2025-03-02 NOTE — CONSULTS
GI CONSULT      Reason for Consultation: epig pain    HPI:  64-year-old woman with coronary artery disease with stents, CHF who presented here with upper abdominal discomfort.  Patient states this is in the epigastric area and in her upper abdomen.  This is moderate severity.  Duration is 1 week.  She does have associated nausea.  She states she takes NSAIDs at home.  She will also state that periodically this will wake her up it odd hours in the night with radiation to her right shoulder.    She does take Prilosec at home     CT scan done this admission did show patent vasculature and of note this was a CTA.  There was also some gallbladder wall thickening on the CT scan    She has never had an EGD or colonoscopy.  She states there was no family history of colon cancer or stomach ulcers.    She denies fevers chills or any foreign travel.      Review of patient's allergies indicates:   Allergen Reactions    Meperidine Shortness Of Breath    Sulfa (sulfonamide antibiotics) Shortness Of Breath          Current Medications[1]    Prescriptions Prior to Admission[2]      Past Medical History:    Past Medical History:   Diagnosis Date    Known health problems: none         Past Surgical History:    Past Surgical History:   Procedure Laterality Date     SECTION       SECTION      LEFT HEART CATHETERIZATION N/A 2025    Procedure: Left heart cath;  Surgeon: Inna Turner MD;  Location: [facility] CATH LAB;  Service: Cardiology;  Laterality: N/A;    WRIST SURGERY          Family History:    No family history on file.    Social History:    Social History     Tobacco Use    Smoking status: Never    Smokeless tobacco: Never   Substance Use Topics    Alcohol use: Yes     Comment: occ            Review of Systems:    Review of Systems   Constitutional: Negative.    HENT: Negative.     Eyes: Negative.    Respiratory: Negative.     Cardiovascular: Negative.    Gastrointestinal:  Positive for abdominal pain.    Genitourinary: Negative.    Skin: Negative.    Neurological: Negative.    Psychiatric/Behavioral: Negative.           Objective:        VITALS:     Temp Readings from Last 3 Encounters:   03/02/25 98.4 °F (36.9 °C) (Oral)   03/01/25 99.9 °F (37.7 °C)   02/28/25 98.5 °F (36.9 °C) (Oral)     BP Readings from Last 3 Encounters:   03/02/25 104/68   03/01/25 91/61   02/28/25 90/60     Pulse Readings from Last 3 Encounters:   03/02/25 96   03/01/25 83   02/28/25 77            Intake/Output Summary (Last 24 hours) at 3/2/2025 1403  Last data filed at 3/2/2025 1341  Gross per 24 hour   Intake 700 ml   Output 1 ml   Net 699 ml         Physical Exam  Vitals reviewed.   Constitutional:       General: She is not in acute distress.     Appearance: Normal appearance.   HENT:      Head: Normocephalic and atraumatic.      Mouth/Throat:      Pharynx: No oropharyngeal exudate or posterior oropharyngeal erythema.   Eyes:      General: No scleral icterus.        Right eye: No discharge.         Left eye: No discharge.      Extraocular Movements: Extraocular movements intact.   Cardiovascular:      Rate and Rhythm: Normal rate.   Pulmonary:      Effort: Pulmonary effort is normal.      Breath sounds: Normal breath sounds.   Abdominal:      General: Abdomen is flat. Bowel sounds are normal.      Palpations: Abdomen is soft.   Skin:     Capillary Refill: Capillary refill takes less than 2 seconds.   Neurological:      General: No focal deficit present.      Mental Status: She is alert and oriented to person, place, and time.   Psychiatric:         Behavior: Behavior normal.             Recent Results (from the past 48 hours)   EKG 12-lead    Collection Time: 03/01/25  2:17 PM   Result Value Ref Range    QRS Duration 68 ms    OHS QTC Calculation 452 ms   CBC with Differential    Collection Time: 03/01/25  2:33 PM   Result Value Ref Range    WBC 15.31 (H) 4.50 - 11.50 x10(3)/mcL    RBC 4.27 4.20 - 5.40 x10(6)/mcL    Hgb 12.8 12.0 - 16.0  g/dL    Hct 37.5 37.0 - 47.0 %    MCV 87.8 80.0 - 94.0 fL    MCH 30.0 27.0 - 31.0 pg    MCHC 34.1 33.0 - 36.0 g/dL    RDW 13.7 11.5 - 17.0 %    Platelet 275 130 - 400 x10(3)/mcL    MPV 11.8 (H) 7.4 - 10.4 fL    Neut % 84.4 %    Lymph % 3.6 %    Mono % 11.3 %    Eos % 0.0 %    Basophil % 0.2 %    Imm Grans % 0.5 %    Neut # 12.93 (H) 2.1 - 9.2 x10(3)/mcL    Lymph # 0.55 (L) 0.6 - 4.6 x10(3)/mcL    Mono # 1.73 (H) 0.1 - 1.3 x10(3)/mcL    Eos # 0.00 0 - 0.9 x10(3)/mcL    Baso # 0.03 <=0.2 x10(3)/mcL    Imm Gran # 0.07 (H) 0.00 - 0.04 x10(3)/mcL    NRBC% 0.0 %   Comprehensive metabolic panel    Collection Time: 03/01/25  2:34 PM   Result Value Ref Range    Sodium 137 136 - 145 mmol/L    Potassium 3.9 3.5 - 5.1 mmol/L    Chloride 106 98 - 107 mmol/L    CO2 20 (L) 23 - 31 mmol/L    Glucose 131 (H) 82 - 115 mg/dL    Blood Urea Nitrogen 9.0 (L) 9.8 - 20.1 mg/dL    Creatinine 0.76 0.55 - 1.02 mg/dL    Calcium 9.4 8.4 - 10.2 mg/dL    Protein Total 7.3 5.8 - 7.6 gm/dL    Albumin 3.5 3.4 - 4.8 g/dL    Globulin 3.8 (H) 2.4 - 3.5 gm/dL    Albumin/Globulin Ratio 0.9 (L) 1.1 - 2.0 ratio    Bilirubin Total 1.0 <=1.5 mg/dL    ALP 65 40 - 150 unit/L    ALT 16 0 - 55 unit/L    AST 28 5 - 34 unit/L    eGFR >60 mL/min/1.73/m2    Anion Gap 11.0 mEq/L    BUN/Creatinine Ratio 12    Troponin I #1    Collection Time: 03/01/25  2:34 PM   Result Value Ref Range    Troponin-I 1.387 (H) 0.000 - 0.045 ng/mL   BNP    Collection Time: 03/01/25  2:34 PM   Result Value Ref Range    Natriuretic Peptide 257.9 (H) <=100.0 pg/mL   Lipase    Collection Time: 03/01/25  2:34 PM   Result Value Ref Range    Lipase Level 21 <=60 U/L   Lactic acid, plasma    Collection Time: 03/01/25  4:54 PM   Result Value Ref Range    Lactic Acid Level 1.4 0.5 - 2.2 mmol/L   Troponin I #2    Collection Time: 03/01/25  5:27 PM   Result Value Ref Range    Troponin-I 1.426 (H) 0.000 - 0.045 ng/mL   EKG 12-lead    Collection Time: 03/01/25  8:26 PM   Result Value Ref Range    QRS  Duration 68 ms    OHS QTC Calculation 469 ms   Troponin I    Collection Time: 03/02/25 12:51 AM   Result Value Ref Range    Troponin-I 1.265 (H) 0.000 - 0.045 ng/mL   COVID/RSV/FLU A&B PCR    Collection Time: 03/02/25  2:37 AM   Result Value Ref Range    Influenza A PCR Not Detected Not Detected    Influenza B PCR Not Detected Not Detected    Respiratory Syncytial Virus PCR Not Detected Not Detected    SARS-CoV-2 PCR Not Detected Not Detected, Negative   Respiratory Panel    Collection Time: 03/02/25  2:37 AM   Result Value Ref Range    Adenovirus Not Detected Not Detected    Coronavirus 229E Not Detected Not Detected    Coronavirus HKU1 Not Detected Not Detected    Coronavirus NL63 Not Detected Not Detected    Coronavirus OC43 PCR, Common Cold Virus Not Detected Not Detected    Human Metapneumovirus Not Detected Not Detected    Parainfluenza Virus 1 Not Detected Not Detected    Parainfluenza Virus 2 Not Detected Not Detected    Parainfluenza Virus 3 Not Detected Not Detected    Parainfluenza Virus 4 Not Detected Not Detected    Bordetella pertussis (ptxP) Not Detected Not Detected    Chlamydia pneumoniae Not Detected Not Detected    Mycoplasma pneumoniae Not Detected Not Detected    Human Rhinovirus/Enterovirus Not Detected Not Detected    Bordetella parapertussis ([id number]) Not Detected Not Detected   MRSA PCR    Collection Time: 03/02/25  2:37 AM   Result Value Ref Range    MRSA PCR Screen Not Detected Not Detected   Comprehensive Metabolic Panel (CMP)    Collection Time: 03/02/25  6:53 AM   Result Value Ref Range    Sodium 135 (L) 136 - 145 mmol/L    Potassium 3.3 (L) 3.5 - 5.1 mmol/L    Chloride 104 98 - 107 mmol/L    CO2 21 (L) 23 - 31 mmol/L    Glucose 130 (H) 82 - 115 mg/dL    Blood Urea Nitrogen 9.8 9.8 - 20.1 mg/dL    Creatinine 0.78 0.55 - 1.02 mg/dL    Calcium 8.4 8.4 - 10.2 mg/dL    Protein Total 5.7 (L) 5.8 - 7.6 gm/dL    Albumin 3.1 (L) 3.4 - 4.8 g/dL    Globulin 2.6 2.4 - 3.5 gm/dL    Albumin/Globulin  Ratio 1.2 1.1 - 2.0 ratio    Bilirubin Total 0.9 <=1.5 mg/dL    ALP 77 40 - 150 unit/L    ALT 19 0 - 55 unit/L    AST 25 5 - 34 unit/L    eGFR >60 mL/min/1.73/m2    Anion Gap 10.0 mEq/L    BUN/Creatinine Ratio 13    Magnesium    Collection Time: 03/02/25  6:53 AM   Result Value Ref Range    Magnesium Level 1.80 1.60 - 2.60 mg/dL   Troponin I    Collection Time: 03/02/25  6:53 AM   Result Value Ref Range    Troponin-I 0.913 (H) 0.000 - 0.045 ng/mL   CBC with Differential    Collection Time: 03/02/25  6:53 AM   Result Value Ref Range    WBC 11.33 4.50 - 11.50 x10(3)/mcL    RBC 3.71 (L) 4.20 - 5.40 x10(6)/mcL    Hgb 10.9 (L) 12.0 - 16.0 g/dL    Hct 33.8 (L) 37.0 - 47.0 %    MCV 91.1 80.0 - 94.0 fL    MCH 29.4 27.0 - 31.0 pg    MCHC 32.2 (L) 33.0 - 36.0 g/dL    RDW 13.5 11.5 - 17.0 %    Platelet 235 130 - 400 x10(3)/mcL    MPV 12.3 (H) 7.4 - 10.4 fL    Neut % 85.2 %    Lymph % 3.9 %    Mono % 10.2 %    Eos % 0.1 %    Basophil % 0.2 %    Imm Grans % 0.4 %    Neut # 9.67 (H) 2.1 - 9.2 x10(3)/mcL    Lymph # 0.44 (L) 0.6 - 4.6 x10(3)/mcL    Mono # 1.15 0.1 - 1.3 x10(3)/mcL    Eos # 0.01 0 - 0.9 x10(3)/mcL    Baso # 0.02 <=0.2 x10(3)/mcL    Imm Gran # 0.04 0.00 - 0.04 x10(3)/mcL    NRBC% 0.0 %   Echo    Collection Time: 03/02/25  7:21 AM   Result Value Ref Range    BSA 1.54 m2    Norwood's Biplane MOD Ejection Fraction 40 %    A2C EF 42 %    A4C EF 39 %    LVIDd 4.2 3.5 - 6.0 cm    LV Systolic Volume 29 mL    LV Systolic Volume Index 18.8 mL/m2    LVIDs 2.8 2.1 - 4.0 cm    LV Diastolic Volume 77 mL    LV Diastolic Volume Index 50.00 mL/m2    LV EDV A2C [id number] mL    LV EDV A4C 77.70 mL    Left Ventricular End Systolic Volume by Teichholz Method 29.30 mL    Left Ventricular End Diastolic Volume by Teichholz Method 77.30 mL    IVS 1.1 0.6 - 1.1 cm    FS 33.3 28 - 44 %    Left Ventricle Relative Wall Thickness 0.43 cm    PW 0.9 0.6 - 1.1 cm    LV mass 137.2 g    LV Mass Index 89.1 g/m2    ZLVIDS 0.06     ZLVIDD -0.63     Troponin I    Collection Time: 03/02/25 12:38 PM   Result Value Ref Range    Troponin-I 0.917 (H) 0.000 - 0.045 ng/mL           US Abdomen Limited  Result Date: 3/2/2025  EXAMINATION: US ABDOMEN LIMITED CLINICAL HISTORY: Abdominal pain.RUQ/Cholecystitis; COMPARISON: CT yesterday. FINDINGS: Grayscale, color and spectral doppler evaluation of the right upper quadrant. Pancreas is obscured by overlying bowel gas.  Imaged portions of aorta and IVC normal in caliber. Liver is not significantly enlarged. No focal liver lesion. Normal hepatopetal flow is noted in the portal vein. No gallstones are seen.  Mild nonspecific gallbladder wall thickening and pericholecystic fluid.  Common bile duct measures 7 mm. Right kidney measures 10-11 cm in length. No hydronephrosis. There is a small right pleural effusion.     1. No gallstones are seen. 2. Mild nonspecific gallbladder wall thickening with pericholecystic fluid.  This could relate to fluid overload. 3. Mild extrahepatic biliary ductal dilatation.  Recommend correlation with cholestatic parameters. 4. Small right pleural effusion. Electronically signed by: Lewis Tillman Date:    03/02/2025 Time:    09:03    CTA Abdomen and Pelvis  Result Date: 3/1/2025  EXAMINATION: CTA ABDOMEN AND PELVIS CLINICAL HISTORY: Acute abdominal pain.  Concern for mesenteric ischemia, acute TECHNIQUE: Axial CT images were obtained through the abdomen and pelvis before and after IV contrast.  100 mL Isovue 370..  Coronal and sagittal reconstructions submitted and interpreted.  Automated exposure control, dose radiation lowering technique, was utilized.  Maximum intensity pixel reconstructions were performed. COMPARISON: CT abdomen and pelvis without contrast from 02/26/5 FINDINGS: CTA: Heart size is within normal limits.  Nonaneurysmal and patent descending thoracic aorta.  There is a patent runoff from the descending thoracic aorta to the aortic bifurcation and from the aortic bifurcation to  both groins.  There is a background of minimal to mild soft and calcified atherosclerotic plaque. Widely patent celiac trunk, SMA and MARITA.  Patent renal arteries bilaterally.  Patent internal iliac arteries bilaterally.  Deep femoral arteries are widely patent bilaterally. OTHER: There are new partially visualized layering small bilateral effusions associated compressive atelectasis.  Diffuse gallbladder wall thickening is present.  There appears to be some high-density material within the gallbladder. Liver appears within normal limits.  Spleen, pancreas, adrenal glands appear normal.  Right kidney is slightly low lying.  No hydronephrosis or nephrolithiasis. The bowel is nonobstructed.  There are fluid-filled loops of small bowel in the left hemiabdomen.  Loops small bowel are nondilated.  Colonic diverticuli are present.  No mural thickening or pericolonic fat stranding to suggest diverticulitis.  Appendix is not seen. Unremarkable bladder.  No free fluid.  No free air.  No suspicious osteolytic or osteoblastic lesion.     1. No evidence of mesenteric ischemia.  Patent mesenteric arterial vasculature. 2. Small bilateral pleural effusions. 3. Diffuse gallbladder wall thickening with small amount of high density material layering dependently.  Findings may be secondary to volume overload.  If there is concern for gallbladder pathology such is cholecystitis, recommend right upper quadrant ultrasound.  However, the CT findings are nonspecific. 4. Colonic diverticuli.  No diverticulitis. Electronically signed by: Nitin Camarena MD Date:    03/01/2025 Time:    17:34    X-Ray Chest AP Portable  Result Date: 3/1/2025  EXAMINATION: XR CHEST AP PORTABLE CLINICAL HISTORY: Chest Pain; TECHNIQUE: Single frontal view of the chest was performed. COMPARISON: February 26, 2025 FINDINGS: The cardiomediastinal silhouette is within normal limits.  Right basilar infiltrate and small effusion is noted.  The remainder of the lung  fields appear clear.  No pneumothorax is seen     Small right basilar infiltrate and associated small effusion are present. Electronically signed by: Isaac Lul Date:    03/01/2025 Time:    15:00    Echo  Result Date: 2/27/2025    Left Ventricle: The left ventricle is normal in size. Normal wall thickness. There is mildly reduced systolic function with a visually estimated ejection fraction of 40 - 45%. Grade I diastolic dysfunction. [place]on is akinetic and resembles takosubo syndrome   Right Ventricle: The right ventricle is normal in size. Systolic function is normal. TAPSE is 1.68 cm.   Mitral Valve: Mildly thickened leaflets. There is moderate regurgitation.   Tricuspid Valve: There is mild regurgitation.   Pericardium: There is no pericardial effusion.     Cardiac catheterization  Result Date: 2/27/2025    Proximal to mid LAD with 80 % noncalcified stenosis treated with IVUS TIEN using a 3.5 x 34 marcela stent post dilated with a 4.0 x 6 NC.  The moderate-sized diagonal was placed in stent longterm with KAYE 3 flow at the completion of the procedure.   Remaining coronaries demonstrated patent anatomy   The ejection fraction was 40% with apical ballooning suggestive of takotsubo.  The EDP was 17 mmHg.   Right radial approach   Estimated blood loss was less than 10 cc.  Contrast delivery 200 cc. The procedure log was documented by No documenter listed and verified by Inna Turner MD. Date: 2/27/2025  Time: 10:21 AM Procedure: Selective coronary angiography Left ventriculography Left heart catheterization IVUS guided TIEN of proximal to mid LAD Indication:  Non STEMI with angina Consent: The patient was brought to the cardiac catheterization lab. Was instructed and explained about the risk, benefit and alternatives of the procedure included but not limited to sudden cardiac death, myocardial infarction, bleeding, vascular injury, renal failure, stroke, contrast allergy, risk of conscious sedation and need for  emergent bypass surgery.  the patient was agreeable to proceed.  Signed the consent form. time-out was completed verifying correct patient, procedure, site, positioning, and special equipment if applicable. Access:  The patient was prepped using the usual sterile fashion. Right radial artery  was accessed with micropuncture technique, ultrasound guidance.  Sheath size:6F Kali test:  Verified with pulse oximetry deemed to be adequate for radial artery procedure. Diagnostic catheters :  Clem JL 3.5 and EBU 3.0 guide Coronary findings: Dominance: right Left main:  Patent vessel that bifurcates into a LAD and circumflex system. Left anterior descending artery:  Large type 2 vessel with diffuse proximal to mid segment stenosis up to 80% by IVUS.  1st diagonal is a moderate-sized vessel that originates proximal to the stenosis. Circumflex artery:  Patent nondominant vessel that gives rise to a large obtuse marginal-1.  The AV groove artery arises before the obtuse marginal and terminates in a small terminal OM-2 Right coronary artery:  Patent dominant vessel terminates in a PDA and a moderate-sized JHONATHAN Left ventriculography: The ejection fraction was 40% with apical ballooning suggestive of takotsubo. The EDP was 17 mmHg. Intervention: Proximal to mid LAD 80% stenosis reduced to 0% following IVUS guided TIEN using a 3.5 x 34 marcela post dilated proximally with a 4 x 6 NC.  The moderate-sized diagonal was placed in stent detention with KAYE 3 flow throughout the vessel at the completion of the procedure. -an EBU 3.0 was used to cannulate the left coronary system and a  50 wire was advanced into the distal LAD -a Prowater wire was advanced into the moderate-sized diagonal -IVUS was performed -primary stenting using a 3.5 x 34 marcela TIEN followed by IVUS.  The diagonal was placed in stent detention with no compromise to flow -the Prowater wire was removed and the proximal stent was post dilated using a 4 x 6 NC Access Closure :  At  the end of the procedure the sheath was removed. A TR band applied to the right radial artery . Excellent hemostasis achieved. Impression/plan:  Emergent left heart catheterization for non STEMI with persistent angina.  Proximal to mid LAD with diffuse disease up to 80% by IVUS treated with TIEN.  The moderate-sized diagonal was placed in stent snf -patient loaded with Aggrastat single bolus and Brilinta -continue DA PT for 1 year -started on atorvastatin 80 mg daily -GD MT     X-Ray Chest 1 View  Result Date: 2/26/2025  EXAMINATION: XR CHEST 1 VIEW CLINICAL HISTORY: Epigastric pain FINDINGS: The lungs are deeply inflated.  There are nonspecific central interstitial opacities.  There is no dense lobar consolidation, large pleural effusion or pneumothorax.  The cardiomediastinal silhouette is normal in size and configuration.     1. Nonspecific central interstitial opacities. 2. No dense lobar consolidation, large pleural effusion or pneumothorax. Electronically signed by: Kaveh Webster MD Date:    02/26/2025 Time:    18:56    CTA Chest Non-Coronary (PE Studies)  Result Date: 2/26/2025  EXAMINATION: CTA CHEST NON CORONARY (PE STUDIES) CLINICAL HISTORY: Pulmonary embolism (PE) suspected, high prob; epigastric pain, dizziness, TECHNIQUE: Helical imaging of the pulmonary arteries was performed.  Soft tissue and lung algorithms were applied.  Axial, sagittal and coronal images were generated.  3D MIP images were performed. Automated exposure control software was utilized to limit radiation exposure to the patient.  Total DLP for this study was 134 mgycm. COMPARISON: No prior pertinent study is currently available. FINDINGS: There is no central pulmonary arterial filling defect to suggest pulmonary thromboembolism. Cardiac chamber sizes are within normal limits.  There is no pleural or pericardial fluid.  There are no enlarged intrathoracic lymph nodes.  There are subcentimeter bilateral hilar short axis lymph nodes.  There is no dense lobar consolidation or suspicious focal pulmonary parenchymal finding. There is no acute finding in the included abdomen. There is no acute finding in the included body wall. There is no acute finding in the included base of the neck.     1. No convincing evidence of pulmonary thromboembolism. 2. There are nonspecific subcentimeter bilateral hilar short axis lymph nodes.  Please correlate with clinical exam. Electronically signed by: Kaveh Webster MD Date:    02/26/2025 Time:    18:23    CT Abdomen Pelvis  Without Contrast  Result Date: 2/26/2025  EXAMINATION: CT ABDOMEN PELVIS WITHOUT CONTRAST CLINICAL HISTORY: , Acute abdomen. TECHNIQUE: PATIENT RADIATION DOSE: DLP(mGycm) 162 As per PQRS measures, all CT scans at this facility used dose modulation, iterative reconstruction, and/or weight based dose adjustment when appropriate to reduce radiation dose to as low as reasonably achievable. COMPARISON: None available. FINDINGS: Serial axial images were obtained of the abdomen pelvis without the administration of IV contrast.  Additional sagittal and coronal reconstructions were performed. Degenerative changes are noted to the thoracolumbar spine.  There is slight anterolisthesis of L4 on L5.  The heart is normal in size.  Trace pericardial effusion is seen.  Mild atelectasis and/or scarring is evident the lung bases.  The liver, gallbladder, spleen, adrenal glands, and pancreas are grossly within normal limits without the administration of IV contrast.  The kidneys are relatively symmetric in size.  A nonobstructing punctate calcification is noted to the anterior superior right renal cortex.  No hydronephrosis is seen the stomach is distended with the fluid and particulate food matter.  A few small lymph nodes are seen within the periaortic and pericaval region.  No dilated loops of bowel are identified.  Gas and feces are seen within the colon.  A small fatty umbilical hernia is identified.  The  appendix is not identified with certainty.  The cecum is somewhat high riding in position.  The uterus is grossly normal in size.  Scattered diverticula are noted to the sigmoid colon.  There is mild ill-defined soft tissue fullness at the cervical vaginal region.  The bladder is partially distended with fluid.  The appendix is not identified with certainty.     1. Diverticulosis coli 2. Mild soft tissue fullness at the cervical vaginal region 3. The appendix is not identified with certainty.  Repeat exam with oral contrast would allow further evaluation if clinically indicated. 4. Findings and other details as above Electronically signed by: Matt San Date:    02/26/2025 Time:    15:21            Assessment & Plan:     64-year-old woman with coronary artery disease with stents, CHF who presented here with upper abdominal discomfort.  Patient states this is in the epigastric area and in her upper abdomen.  This is moderate severity.  Duration is 1 week.  She does have associated nausea.  She states she takes NSAIDs at home.  She will also state that periodically this will wake her up it odd hours in the night with radiation to her right shoulder.    Epigastric/upper abdominal pain---we will start with EGD to rule out ulcer disease and gastritis.  She does use NSAIDs at home.  However, we do need to consider gallbladder workup if upper endoscopy is not revealing.  May ultimately need a HIDA scan         [1]   Current Facility-Administered Medications   Medication Dose Route Frequency Provider Last Rate Last Admin    aluminum-magnesium hydroxide-simethicone 200-200-20 mg/5 mL suspension 30 mL  30 mL Oral QID PRN Lori Maravilla FNP        [START ON 3/3/2025] aspirin EC tablet 81 mg  81 mg Oral Daily Lori Maravilla FNP        [START ON 3/3/2025] atorvastatin tablet 80 mg  80 mg Oral Daily Lori Maravilla FNP        bisacodyL suppository 10 mg  10 mg Rectal Daily PRN Lori Maravilal FNP        dextrose 50%  injection 12.5 g  12.5 g Intravenous PRN Lori Maravilla, CHRISTINAP        dextrose 50% injection 25 g  25 g Intravenous PRN Emma Maravillasa, FNP        enoxaparin injection 50 mg  1 mg/kg Subcutaneous Q12H (prophylaxis, 0900/2100) Tana Phillips FNP        glucagon (human recombinant) injection 1 mg  1 mg Intramuscular PRN Emma Maravillasa, FNP        glucose chewable tablet 16 g  16 g Oral PRN Lori Maravilla, FNP        glucose chewable tablet 24 g  24 g Oral PRN Emma Maravillasa, FNP        melatonin tablet 6 mg  6 mg Oral Nightly PRN Lori Maravilla, FNP        metoprolol succinate (TOPROL-XL) 24 hr tablet 100 mg  100 mg Oral Daily Tana Phillips FNP   100 mg at 03/02/25 1217    ondansetron injection 4 mg  4 mg Intravenous Q6H PRN Dalton Osborne MD   4 mg at 03/02/25 0938    pantoprazole injection 40 mg  40 mg Intravenous BID Dalton Osborne MD   40 mg at 03/02/25 1049    polyethylene glycol packet 17 g  17 g Oral BID PRN Lori Maravilla FNP   17 g at 03/02/25 0149    potassium chloride SA CR tablet 20 mEq  20 mEq Oral Once Tana Phillips FNP        sodium chloride 0.9% flush 10 mL  10 mL Intravenous PRN Lori Maravilla, FNP        ticagrelor tablet 90 mg  90 mg Oral BID Lori Maravilla FNP   90 mg at 03/02/25 0859   [2]   Medications Prior to Admission   Medication Sig Dispense Refill Last Dose/Taking    aspirin (ECOTRIN) 81 MG EC tablet Take 1 tablet (81 mg total) by mouth once daily. 90 tablet 3 3/2/2025 Morning    atorvastatin (LIPITOR) 80 MG tablet Take 1 tablet (80 mg total) by mouth once daily. 90 tablet 1 3/2/2025 Morning    losartan (COZAAR) 25 MG tablet Take 0.5 tablets (12.5 mg total) by mouth once daily. 45 tablet 3 3/2/2025 Morning    metoprolol succinate (TOPROL-XL) 50 MG 24 hr tablet Take 1 tablet (50 mg total) by mouth once daily. 90 tablet 3 3/2/2025 Morning    nitroGLYCERIN (NITROSTAT) 0.4 MG SL tablet Place 1 tablet (0.4 mg total) under the tongue every 5 (five) minutes as needed for Chest pain.  10 tablet 1 Past Month    pantoprazole (PROTONIX) 40 MG tablet Take 1 tablet (40 mg total) by mouth once daily. 30 tablet 0 3/1/2025    ticagrelor (BRILINTA) 90 mg tablet Take 1 tablet (90 mg total) by mouth 2 (two) times daily. 60 tablet 11 3/2/2025 Morning

## 2025-03-02 NOTE — H&P
Hospital Medicine  History & Physical Examination       Patient: Hien Roy  MRN: [id number]  STATUS: IP- Inpatient   DOS: 3/2/2025   PCP: Deana Jaimes FNP      CC: epigastric pain       HISTORY OF PRESENT ILLNESS       64 y.o. female with a history that includes recent NSTEMI s/p PCI TIEN to LAD, with associated cardiomyopathy (EF 40-45%), discharged home on , presented back to ED on 3/1 with epigastric abd pain radiating to her back. She notes nausea but no vomiting, though has been unable/unwilling to have oral intake due to pain.    Evaluation in the ED noted pressure is borderline low, with the patient developing tachycardic up to 160s.  EKG noting sinus tachycardia.  Labs note white count of 15,300, mild NAGMA, lipase and LFTs are WNL. Troponin continues to trend down since PCI.      REVIEW OF SYSTEMS     Except as documented, all other systems reviewed and negative       PAST MEDICAL HISTORY     CAD  Cardiomyopathy      PAST SURGICAL HISTORY      SECTION     SECTION    LEFT HEART CATHETERIZATION 2025   WRIST SURGERY        FAMILY HISTORY     Reviewed, negative in relation to patient's current condition.      SOCIAL HISTORY     Denies alcohol, tobacco or drug abuse  Screening for Social Drivers of health:  []Housing/Food Insecurity []Transportation Needs []Utility Difficulties []Interpersonal safety [x]Noncontributory      ALLERGIES     MEPERIDINE  SULFA DRUGS      HOME MEDICATIONS      aspirin (ECOTRIN) 81 mg, Oral, Daily    atorvastatin (LIPITOR) 80 mg, Oral, Daily    losartan (COZAAR) 12.5 mg, Oral, Daily    metoprolol succinate (TOPROL-XL) 50 mg, Oral, Daily    nitroGLYCERIN (NITROSTAT) 0.4 mg, Sublingual, Every 5 min PRN    pantoprazole (PROTONIX) 40 mg, Oral, Daily    ticagrelor (BRILINTA) 90 mg, Oral, 2 times daily       PHYSICAL EXAM     VITALS: T 98.5 °F (36.9 °C)   BP 98/66   P 85   RR 18   O2 (!) 93 %    GENERAL: Awake, in mild distress due abd  discomfort/nausea  HEENT: NC/AT, EOMI, PERRL.  NECK: Supple,  No JVD  LUNGS: CTA anteriorly  CVS: RRR, S1S2 positive  GI/: Soft, TTP across upper abdomen (L>R), bowel sounds present  EXTREMITIES: Peripheral pulses 2+, no peripheral edema  DERM: Warm, dry.  No rashes or lesions noted.  NEURO: AAOx3, no focal neurologic deficit  PSYCH: Cooperative, appropriate mood and affect       DIAGNOSTICS     Recent Labs     03/01/25  1433 03/02/25  0653   WBC 15.31* 11.33   RBC 4.27 3.71*   HGB 12.8 10.9*   HCT 37.5 33.8*   MCV 87.8 91.1   MCH 30.0 29.4   MCHC 34.1 32.2*   RDW 13.7 13.5    235       Recent Labs     02/27/25  1150   HGBA1C 5.5      Recent Labs     02/28/25  0307 03/01/25  1434 03/02/25  0653    137 135*   K 3.9 3.9 3.3*   CO2 21* 20* 21*   BUN 8.6* 9.0* 9.8   CREATININE 0.79 0.76 0.78   GLUCOSE 97 131* 130*   CALCIUM 8.3* 9.4 8.4   MG 2.20  --  1.80   ALBUMIN 3.5 3.5 3.1*   GLOBULIN 2.3* 3.8* 2.6   ALKPHOS 45 65 77   ALT 11 16 19   AST 40* 28 25   BILITOT 0.6 1.0 0.9   LIPASE  --  21  --      Recent Labs     03/01/25  1434 03/01/25  1727 03/02/25  0051 03/02/25  0653   .9*  --   --   --    TROPONINI 1.387* 1.426* 1.265* 0.913*          CTA Abdomen and Pelvis  Narrative:   CTA:  Heart size is within normal limits.  Nonaneurysmal and patent descending thoracic aorta.  There is a patent runoff from the descending thoracic aorta to the aortic bifurcation and from the aortic bifurcation to both groins.  There is a background of minimal to mild soft and calcified atherosclerotic plaque.  Widely patent celiac trunk, SMA and MARITA.  Patent renal arteries bilaterally.  Patent internal iliac arteries bilaterally.  Deep femoral arteries are widely patent bilaterally.  OTHER:  There are new partially visualized layering small bilateral effusions associated compressive atelectasis.  Diffuse gallbladder wall thickening is present.  There appears to be some high-density material within the  gallbladder.  Liver appears within normal limits.  Spleen, pancreas, adrenal glands appear normal.  Right kidney is slightly low lying.  No hydronephrosis or nephrolithiasis.  The bowel is nonobstructed.  There are fluid-filled loops of small bowel in the left hemiabdomen.  Loops small bowel are nondilated.  Colonic diverticuli are present.  No mural thickening or pericolonic fat stranding to suggest diverticulitis.  Appendix is not seen.  Unremarkable bladder.  No free fluid.  No free air.  No suspicious osteolytic or osteoblastic lesion.  Impression:   1. No evidence of mesenteric ischemia.  Patent mesenteric arterial vasculature.  2. Small bilateral pleural effusions.  3. Diffuse gallbladder wall thickening with small amount of high density material layering dependently.  Findings may be secondary to volume overload.  If there is concern for gallbladder pathology such is cholecystitis, recommend right upper quadrant ultrasound.  However, the CT findings are nonspecific.  4. Colonic diverticuli.  No diverticulitis.  Electronically signed by: Nitin Camarena MD  Date:    03/01/2025  Time:    17:34    X-Ray Chest AP Portable  Narrative:   The cardiomediastinal silhouette is within normal limits.  Right basilar infiltrate and small effusion is noted.  The remainder of the lung fields appear clear.  No pneumothorax is seen  Impression:   Small right basilar infiltrate and associated small effusion are present.  Electronically signed by: Isaac Mccarty  Date:    03/01/2025  Time:    15:00        ASSESSMENT     Epigastric pain, concern for PUD  Recent NSTEMI, s/p TIEN to LAD  Cardiomyopathy, compensated    PLAN     Start IV PPI BID, continue NPO  Will consult GI for evaluation  Cardiology on board as well, though troponin trending down, and issues appear to be GI in nature  Continue ASA/Brilinta given recent TIEN  Otherwise continue symptomatic management and close monitoring for now      Prophylaxis: B/L SCDs  Code  Status: Full      Dalton Osborne MD  Hospital Medicine            If the patient has been admitted under observation status, it is at my discretion and under our care with hospital medicine services. [TWA]

## 2025-03-03 LAB
ANION GAP SERPL CALC-SCNC: 10 MEQ/L
BUN SERPL-MCNC: 10.6 MG/DL (ref 9.8–20.1)
CALCIUM SERPL-MCNC: 9.2 MG/DL (ref 8.4–10.2)
CHLORIDE SERPL-SCNC: 105 MMOL/L (ref 98–107)
CO2 SERPL-SCNC: 25 MMOL/L (ref 23–31)
CREAT SERPL-MCNC: 0.85 MG/DL (ref 0.55–1.02)
CREAT/UREA NIT SERPL: 12
GFR SERPLBLD CREATININE-BSD FMLA CKD-EPI: >60 ML/MIN/1.73/M2
GLUCOSE SERPL-MCNC: 106 MG/DL (ref 82–115)
MAGNESIUM SERPL-MCNC: 1.8 MG/DL (ref 1.6–2.6)
OHS QRS DURATION: 68 MS
OHS QRS DURATION: 68 MS
OHS QRS DURATION: 72 MS
OHS QRS DURATION: 74 MS
OHS QRS DURATION: 80 MS
OHS QRS DURATION: 92 MS
OHS QTC CALCULATION: 452 MS
OHS QTC CALCULATION: 469 MS
OHS QTC CALCULATION: 474 MS
OHS QTC CALCULATION: 474 MS
OHS QTC CALCULATION: 484 MS
OHS QTC CALCULATION: 531 MS
POTASSIUM SERPL-SCNC: 3.7 MMOL/L (ref 3.5–5.1)
SODIUM SERPL-SCNC: 140 MMOL/L (ref 136–145)
TSH SERPL-ACNC: 4.14 UIU/ML (ref 0.35–4.94)

## 2025-03-03 PROCEDURE — 63600175 PHARM REV CODE 636 W HCPCS: Performed by: NURSE PRACTITIONER

## 2025-03-03 PROCEDURE — 21400001 HC TELEMETRY ROOM

## 2025-03-03 PROCEDURE — 93005 ELECTROCARDIOGRAM TRACING: CPT

## 2025-03-03 PROCEDURE — 80048 BASIC METABOLIC PNL TOTAL CA: CPT | Performed by: NURSE PRACTITIONER

## 2025-03-03 PROCEDURE — 25000003 PHARM REV CODE 250

## 2025-03-03 PROCEDURE — 63600175 PHARM REV CODE 636 W HCPCS: Performed by: INTERNAL MEDICINE

## 2025-03-03 PROCEDURE — 83735 ASSAY OF MAGNESIUM: CPT | Performed by: NURSE PRACTITIONER

## 2025-03-03 PROCEDURE — 93010 ELECTROCARDIOGRAM REPORT: CPT | Mod: ,,, | Performed by: STUDENT IN AN ORGANIZED HEALTH CARE EDUCATION/TRAINING PROGRAM

## 2025-03-03 PROCEDURE — 36415 COLL VENOUS BLD VENIPUNCTURE: CPT | Performed by: NURSE PRACTITIONER

## 2025-03-03 PROCEDURE — 25000003 PHARM REV CODE 250: Performed by: NURSE PRACTITIONER

## 2025-03-03 RX ORDER — ONDANSETRON HYDROCHLORIDE 2 MG/ML
4 INJECTION, SOLUTION INTRAVENOUS DAILY PRN
Status: DISCONTINUED | OUTPATIENT
Start: 2025-03-03 | End: 2025-03-06 | Stop reason: HOSPADM

## 2025-03-03 RX ORDER — POTASSIUM CHLORIDE 20 MEQ/1
40 TABLET, EXTENDED RELEASE ORAL ONCE
Status: COMPLETED | OUTPATIENT
Start: 2025-03-03 | End: 2025-03-03

## 2025-03-03 RX ORDER — SODIUM CHLORIDE, SODIUM GLUCONATE, SODIUM ACETATE, POTASSIUM CHLORIDE AND MAGNESIUM CHLORIDE 30; 37; 368; 526; 502 MG/100ML; MG/100ML; MG/100ML; MG/100ML; MG/100ML
INJECTION, SOLUTION INTRAVENOUS CONTINUOUS
Status: DISCONTINUED | OUTPATIENT
Start: 2025-03-03 | End: 2025-03-06 | Stop reason: HOSPADM

## 2025-03-03 RX ORDER — METOPROLOL TARTRATE 1 MG/ML
5 INJECTION, SOLUTION INTRAVENOUS ONCE
Status: DISCONTINUED | OUTPATIENT
Start: 2025-03-03 | End: 2025-03-04

## 2025-03-03 RX ORDER — GLUCAGON 1 MG
1 KIT INJECTION
Status: DISCONTINUED | OUTPATIENT
Start: 2025-03-03 | End: 2025-03-06 | Stop reason: HOSPADM

## 2025-03-03 RX ORDER — LIDOCAINE HYDROCHLORIDE 10 MG/ML
1 INJECTION, SOLUTION EPIDURAL; INFILTRATION; INTRACAUDAL; PERINEURAL ONCE
Status: DISCONTINUED | OUTPATIENT
Start: 2025-03-03 | End: 2025-03-06 | Stop reason: HOSPADM

## 2025-03-03 RX ORDER — HYDROCODONE BITARTRATE AND ACETAMINOPHEN 5; 325 MG/1; MG/1
1 TABLET ORAL EVERY 4 HOURS PRN
Status: DISCONTINUED | OUTPATIENT
Start: 2025-03-03 | End: 2025-03-06 | Stop reason: HOSPADM

## 2025-03-03 RX ORDER — MAGNESIUM SULFATE HEPTAHYDRATE 40 MG/ML
2 INJECTION, SOLUTION INTRAVENOUS ONCE
Status: COMPLETED | OUTPATIENT
Start: 2025-03-03 | End: 2025-03-03

## 2025-03-03 RX ORDER — ONDANSETRON 4 MG/1
4 TABLET, ORALLY DISINTEGRATING ORAL ONCE
Status: DISCONTINUED | OUTPATIENT
Start: 2025-03-03 | End: 2025-03-06 | Stop reason: HOSPADM

## 2025-03-03 RX ORDER — METOPROLOL TARTRATE 1 MG/ML
5 INJECTION, SOLUTION INTRAVENOUS ONCE
Status: DISCONTINUED | OUTPATIENT
Start: 2025-03-03 | End: 2025-03-03

## 2025-03-03 RX ORDER — HYDROMORPHONE HYDROCHLORIDE 2 MG/ML
0.4 INJECTION, SOLUTION INTRAMUSCULAR; INTRAVENOUS; SUBCUTANEOUS EVERY 5 MIN PRN
Status: DISCONTINUED | OUTPATIENT
Start: 2025-03-03 | End: 2025-03-05

## 2025-03-03 RX ORDER — MIDAZOLAM HYDROCHLORIDE 2 MG/2ML
2 INJECTION, SOLUTION INTRAMUSCULAR; INTRAVENOUS ONCE AS NEEDED
Status: DISCONTINUED | OUTPATIENT
Start: 2025-03-03 | End: 2025-03-06 | Stop reason: HOSPADM

## 2025-03-03 RX ORDER — SODIUM CHLORIDE, SODIUM LACTATE, POTASSIUM CHLORIDE, CALCIUM CHLORIDE 600; 310; 30; 20 MG/100ML; MG/100ML; MG/100ML; MG/100ML
INJECTION, SOLUTION INTRAVENOUS CONTINUOUS
Status: DISCONTINUED | OUTPATIENT
Start: 2025-03-03 | End: 2025-03-06 | Stop reason: HOSPADM

## 2025-03-03 RX ORDER — DIPHENHYDRAMINE HYDROCHLORIDE 50 MG/ML
25 INJECTION, SOLUTION INTRAMUSCULAR; INTRAVENOUS ONCE
Status: DISCONTINUED | OUTPATIENT
Start: 2025-03-03 | End: 2025-03-04

## 2025-03-03 RX ORDER — ACETAMINOPHEN 325 MG/1
650 TABLET ORAL EVERY 4 HOURS PRN
Status: DISCONTINUED | OUTPATIENT
Start: 2025-03-03 | End: 2025-03-06 | Stop reason: HOSPADM

## 2025-03-03 RX ADMIN — ATORVASTATIN CALCIUM 80 MG: 40 TABLET, FILM COATED ORAL at 09:03

## 2025-03-03 RX ADMIN — AMIODARONE HYDROCHLORIDE 0.5 MG/MIN: 1.8 INJECTION, SOLUTION INTRAVENOUS at 05:03

## 2025-03-03 RX ADMIN — TICAGRELOR 90 MG: 90 TABLET ORAL at 10:03

## 2025-03-03 RX ADMIN — PANTOPRAZOLE SODIUM 40 MG: 40 INJECTION, POWDER, LYOPHILIZED, FOR SOLUTION INTRAVENOUS at 10:03

## 2025-03-03 RX ADMIN — AMIODARONE HYDROCHLORIDE 1 MG/MIN: 1.8 INJECTION, SOLUTION INTRAVENOUS at 11:03

## 2025-03-03 RX ADMIN — ENOXAPARIN SODIUM 50 MG: 60 INJECTION SUBCUTANEOUS at 10:03

## 2025-03-03 RX ADMIN — POTASSIUM CHLORIDE 40 MEQ: 1500 TABLET, EXTENDED RELEASE ORAL at 09:03

## 2025-03-03 RX ADMIN — MAGNESIUM SULFATE HEPTAHYDRATE 2 G: 40 INJECTION, SOLUTION INTRAVENOUS at 09:03

## 2025-03-03 RX ADMIN — TICAGRELOR 90 MG: 90 TABLET ORAL at 09:03

## 2025-03-03 RX ADMIN — ENOXAPARIN SODIUM 50 MG: 60 INJECTION SUBCUTANEOUS at 09:03

## 2025-03-03 RX ADMIN — AMIODARONE HYDROCHLORIDE 0.5 MG/MIN: 1.8 INJECTION, SOLUTION INTRAVENOUS at 06:03

## 2025-03-03 RX ADMIN — METOPROLOL SUCCINATE 100 MG: 50 TABLET, EXTENDED RELEASE ORAL at 09:03

## 2025-03-03 RX ADMIN — AMIODARONE HYDROCHLORIDE 150 MG: 1.5 INJECTION, SOLUTION INTRAVENOUS at 10:03

## 2025-03-03 RX ADMIN — ASPIRIN 81 MG: 81 TABLET, COATED ORAL at 09:03

## 2025-03-03 NOTE — PLAN OF CARE
GI plan of care:     Plan for EGD today for epigastric pain, but canceled due to tachycardia    Plan:  -plan for EGD tomorrow   -NPO at midnight

## 2025-03-03 NOTE — PROGRESS NOTES
Hospital Medicine  Progress Note    Patient Name: Hien Roy  MRN: [id number]  Status: IP- Inpatient   Admission Date: 3/1/2025  Length of Stay: 2  Date of Service: 03/03/2025       CC: hospital follow-up for Tachycardia       SUBJECTIVE     64 y.o. female with a history that includes recent NSTEMI s/p PCI TIEN to LAD, with associated cardiomyopathy (EF 40-45%), discharged home on 02/28, presented back to ED on 3/1 with epigastric abd pain radiating to her back. She notes nausea but no vomiting, though has been unable/unwilling to have oral intake due to pain.    Evaluation in the ED noted pressure is borderline low, with the patient developing tachycardic up to 160s.  EKG noting sinus tachycardia.  Labs note white count of 15,300, mild NAGMA, lipase and LFTs are WNL. Troponin continues to trend down since PCI.    Today: Patient seen and examined at bedside, and chart reviewed.  Paged to bedside this morning as patient developed marked tachycardiac his morning >200bpm.  Upon arrival rate had spontaneously come down to 120s, was irregular, though EKG notes sinus tach with aberrancies.        MEDICATIONS   Scheduled   aspirin  81 mg Oral Daily    atorvastatin  80 mg Oral Daily    enoxparin  1 mg/kg Subcutaneous Q12H (prophylaxis, 0900/2100)    metoprolol  5 mg Intravenous Once    metoprolol succinate  100 mg Oral Daily    pantoprazole  40 mg Intravenous BID    ticagrelor  90 mg Oral BID     Continuous Infusions   amiodarone in dextrose 5%  1 mg/min Intravenous Continuous 33.3 mL/hr at 03/03/25 1109 1 mg/min at 03/03/25 1109    amiodarone in dextrose 5%  0.5 mg/min Intravenous Continuous             PHYSICAL EXAM   VITALS: T 98.3 °F (36.8 °C)   /84   P 92   RR 16   O2 97 %    GENERAL: Awake and moderate disress  LUNGS: CTA anteriorly  CVS: tachycardic, irregularly irregular  GI/: Soft, NT, bowel sounds positive.  EXTREMITIES: No LE edema  NEURO: AAOx3  PSYCH: Cooperative      LABS   CBC  Recent Labs      03/01/25  1433 03/02/25  0653   WBC 15.31* 11.33   RBC 4.27 3.71*   HGB 12.8 10.9*   HCT 37.5 33.8*   MCV 87.8 91.1   MCH 30.0 29.4   MCHC 34.1 32.2*   RDW 13.7 13.5    235     CHEM  Recent Labs     03/01/25  1434 03/02/25  0653 03/02/25  1238 03/03/25  0656    135*  --  140   K 3.9 3.3*  --  3.7   CO2 20* 21*  --  25   BUN 9.0* 9.8  --  10.6   CREATININE 0.76 0.78  --  0.85   GLUCOSE 131* 130*  --  106   CALCIUM 9.4 8.4  --  9.2   MG  --  1.80  --  1.80   ALBUMIN 3.5 3.1*  --   --    GLOBULIN 3.8* 2.6  --   --    ALKPHOS 65 77  --   --    ALT 16 19  --   --    AST 28 25  --   --    BILITOT 1.0 0.9  --   --    LIPASE 21  --   --   --    TSH  --   --  4.143  --          DIAGNOSTICS   Echo    Limited EF to assess function. EF is approximately 35-40%. The apex is akinetic and shows evidence of takosubo. No pericardial effusion noted.    US Abdomen Limited  Narrative:   Grayscale, color and spectral doppler evaluation of the right upper quadrant.  Pancreas is obscured by overlying bowel gas.  Imaged portions of aorta and IVC normal in caliber.  Liver is not significantly enlarged. No focal liver lesion. Normal hepatopetal flow is noted in the portal vein.  No gallstones are seen.  Mild nonspecific gallbladder wall thickening and pericholecystic fluid.  Common bile duct measures 7 mm.  Right kidney measures 10-11 cm in length. No hydronephrosis.  There is a small right pleural effusion.  Impression:   1. No gallstones are seen.  2. Mild nonspecific gallbladder wall thickening with pericholecystic fluid.  This could relate to fluid overload.  3. Mild extrahepatic biliary ductal dilatation.  Recommend correlation with cholestatic parameters.  4. Small right pleural effusion.  Electronically signed by: Lewis Tillman  Date:    03/02/2025  Time:    09:03        ASSESSMENT   Tachyarrhythmia, suspect A-flutter  Epigastric pain, concern for PUD  Recent NSTEMI, s/p TIEN to LAD  Cardiomyopathy, compensated    PLAN    Given a dose of IV Lopressor  Will now start amiodarone infusion and make Cardiology aware  Continue monitoring on telemetry  Hold off on EGD, continue PPI BID for now  Otherwise continue current management and close monitoring in the interim        Prophylaxis: FD Raciel Osborne MD  [facility]    Critical Care Time: 36 minutes spent in direct hands on care, review of labs, imaging and medical record, and discussion of diagnosis, treatment, prognosis with patient/family.  Patient remains at high-risk for clinical decompensation  Critical Care diagnosis:  Tachyarrhythmia, requiring amiodarone infusion

## 2025-03-03 NOTE — CODE/ RAPID DOCUMENTATION
"Rrt called to room 932. Pt awake, alert, c/o chest pain, telemetry reading sinus tachycardia at 132. Nurses at bedside report hr was 160-svt. Pt states that it was like a "ball of fire hitting her chest and radiating out, scaring her to death-like she was dying" pt states that pain has eased up. Dr beaver called to bedside- ordered metroprolol iv. Vss throughout rrt.    Just prior to arrival pt was about to go to gi lab for egd.   Nurses notifying cardiology of episode. Pt stated that she had an episode like that yesterday moring that woke her from sleep.    Cardiology notified, but in my opinion more likely gastric cause.   "

## 2025-03-03 NOTE — PROGRESS NOTES
[facility]    Cardiology  Progress Note    Patient Name: Hien Roy  MRN: [id number]  Admission Date: 3/1/2025  Hospital Length of Stay: 2 days  Code Status: Full Code   Attending Provider: Nitin Nash MD   Consulting Provider: ASHLEIGH Morrow  Primary Care Physician: Deana Jaimes FNP  Principal Problem:<principal problem not specified>    Patient information was obtained from patient, past medical records, ER records, and primary team.     Subjective:   Chief Complaint/Reason for Consult: Epigastric Pain & AFL    HPI:   Ms. Roy is a 64 year old female, known to [facility] through recent hospitalization, who presented to the hospital with upper abdominal pain. Patient with recent history of presenting NSTEMI where she underwent LAD Stenting and found to have Takotsubo Cardiomyopathy with EF ~ 45%. Patient was placed on HF Medications including DAPT Post PCI and discharged on 25. Patient reported that abdominal discomfort worsened at home to the point where she could not eat or sleep. She had CTA chest and CT abdomen/pelvis while in the hospital that were  Unremarkable. Lab work up is stable, although troponin value is elevated but down from recent peak (prob due to recent PCI). Lactate normal. EKG revealed atrial flutter- new diagnosis. Respiratory panel negative. Cardizem infusion was started for acute HR Control. CXR revealed Small right basilar infiltrate and associated small effusion are present. Patient admitted to Hospital Medicine Service. [facility] consulted for cardiac evaluation/management.     Hospital Course:  3.3.25: NAD Noted. Vitals Stable. AFL versus SR in the 90's. BP Stable. Reports epigastric discomfort. EGD Planned for today.    PMH: NSTEMI, CAD/PCI LAD, Takotsubo Cardiomyopathy, Anxiety  PSH: , Wrist Surgery, LHC/PCI  Family History: None  Social History: Tobacco- Negative, Alcohol- Occasional Use, Substance Abuse- Negative     Previous  Cardiac Diagnostics:   Echocardiogram (3.2.25):  Limited EF to assess function. EF is approximately 35-40%. The apex is akinetic and shows evidence of takosubo. No pericardial effusion noted.     CT Angiogram Abdomen & Pelvis (3.1.25):  No evidence of mesenteric ischemia.  Patent mesenteric arterial vasculature.  Small bilateral pleural effusions.  Diffuse gallbladder wall thickening with small amount of high density material layering dependently.  Findings may be secondary to volume overload.  If there is concern for gallbladder pathology such is cholecystitis, recommend right upper quadrant ultrasound.  However, the CT findings are nonspecific.  Colonic diverticuli.  No diverticulitis.    Echocardiogram (2.27.25):  Left Ventricle: The left ventricle is normal in size. Normal wall thickness. There is mildly reduced systolic function with a visually estimated ejection fraction of 40 - 45%. Grade I diastolic dysfunction. [place] is akinetic and resembles takosubo syndrome  Right Ventricle: The right ventricle is normal in size. Systolic function is normal. TAPSE is 1.68 cm.  Mitral Valve: Mildly thickened leaflets. There is moderate regurgitation.  Tricuspid Valve: There is mild regurgitation.  Pericardium: There is no pericardial effusion.     LHC with PCI (2.27.25):  Coronary findings:  Dominance: right   Left main:  Patent vessel that bifurcates into a LAD and circumflex system.  Left anterior descending artery:  Large type 2 vessel with diffuse proximal to mid segment stenosis up to 80% by IVUS.  1st diagonal is a moderate-sized vessel that originates proximal to the stenosis.  Circumflex artery:  Patent nondominant vessel that gives rise to a large obtuse marginal-1.  The AV groove artery arises before the obtuse marginal and terminates in a small terminal OM-2  Right coronary artery:  Patent dominant vessel terminates in a PDA and a moderate-sized JHONATHAN  Left ventriculography:  The ejection fraction was 40% with  apical ballooning suggestive of takotsubo. The EDP was 17 mmHg.   Intervention: Proximal to mid LAD 80% stenosis reduced to 0% following IVUS guided TIEN using a 3.5 x 34 marcela post dilated proximally with a 4 x 6 NC.  The moderate-sized diagonal was placed in stent residential with KAYE 3 flow throughout the vessel at the completion of the procedure.      CT Angiogram Chest (2.26.25):  FINDINGS:  There is no central pulmonary arterial filling defect to suggest pulmonary thromboembolism.  Cardiac chamber sizes are within normal limits.  There is no pleural or pericardial fluid.  There are no enlarged intrathoracic lymph nodes.  There are subcentimeter bilateral hilar short axis lymph nodes.  There is no dense lobar consolidation or suspicious focal pulmonary parenchymal finding.  There is no acute finding in the included abdomen. There is no acute finding in the included body wall. There is no acute finding in the included base of the neck.  Impression:  No convincing evidence of pulmonary thromboembolism.  There are nonspecific subcentimeter bilateral hilar short axis lymph nodes.    Review of patient's allergies indicates:   Allergen Reactions    Meperidine Shortness Of Breath    Sulfa (sulfonamide antibiotics) Shortness Of Breath     No current facility-administered medications on file prior to encounter.     Current Outpatient Medications on File Prior to Encounter   Medication Sig    aspirin (ECOTRIN) 81 MG EC tablet Take 1 tablet (81 mg total) by mouth once daily.    atorvastatin (LIPITOR) 80 MG tablet Take 1 tablet (80 mg total) by mouth once daily.    losartan (COZAAR) 25 MG tablet Take 0.5 tablets (12.5 mg total) by mouth once daily.    metoprolol succinate (TOPROL-XL) 50 MG 24 hr tablet Take 1 tablet (50 mg total) by mouth once daily.    nitroGLYCERIN (NITROSTAT) 0.4 MG SL tablet Place 1 tablet (0.4 mg total) under the tongue every 5 (five) minutes as needed for Chest pain.    pantoprazole (PROTONIX) 40 MG tablet  Take 1 tablet (40 mg total) by mouth once daily.    ticagrelor (BRILINTA) 90 mg tablet Take 1 tablet (90 mg total) by mouth 2 (two) times daily.     Review of Systems   Respiratory:  Negative for chest tightness and shortness of breath.    Cardiovascular:  Negative for chest pain.   Gastrointestinal:         Epigastric Discomfort.   All other systems reviewed and are negative.    Objective:     Vital Signs (Most Recent):  Temp: 98.3 °F (36.8 °C) (03/03/25 0738)  Pulse: 103 (03/03/25 0738)  Resp: 16 (03/03/25 0738)  BP: 109/77 (03/03/25 0738)  SpO2: 95 % (03/03/25 0738) Vital Signs (24h Range):  Temp:  [98 °F (36.7 °C)-98.6 °F (37 °C)] 98.3 °F (36.8 °C)  Pulse:  [] 103  Resp:  [16-19] 16  SpO2:  [93 %-97 %] 95 %  BP: ()/(66-79) 109/77   Weight: 53.1 kg (117 lb)  Body mass index is 20.73 kg/m².  SpO2: 95 %       Intake/Output Summary (Last 24 hours) at 3/3/2025 0746  Last data filed at 3/2/2025 1341  Gross per 24 hour   Intake 700 ml   Output --   Net 700 ml     Lines/Drains/Airways       Peripheral Intravenous Line  Duration                  Peripheral IV - Single Lumen 03/01/25 1700 20 G Anterior;Left Forearm 1 day         Peripheral IV - Single Lumen 03/02/25 0144 22 G No Anterior;Left;Proximal Forearm 1 day                  Significant Labs:   Chemistries:   Recent Labs   Lab 02/27/25  0637 02/28/25  0307 03/01/25  1434 03/01/25  1727 03/02/25  0051 03/02/25  0653 03/02/25  1238 03/03/25  0656    139 137  --   --  135*  --  140   K 3.9 3.9 3.9  --   --  3.3*  --  3.7    109* 106  --   --  104  --  105   CO2 23 21* 20*  --   --  21*  --  25   BUN 11.5 8.6* 9.0*  --   --  9.8  --  10.6   CREATININE 0.95 0.79 0.76  --   --  0.78  --  0.85   CALCIUM 9.5 8.3* 9.4  --   --  8.4  --  9.2   BILITOT 0.7 0.6 1.0  --   --  0.9  --   --    ALKPHOS 55 45 65  --   --  77  --   --    ALT 13 11 16  --   --  19  --   --    AST 49* 40* 28  --   --  25  --   --    GLUCOSE 95 97 131*  --   --  130*  --  106  "  MG 1.80 2.20  --   --   --  1.80  --  1.80   TROPONINI 6.143*  --  1.387* 1.426* 1.265* 0.913* 0.917*  --         CBC/Anemia Labs: Coags:    Recent Labs   Lab 02/28/25  0307 03/01/25  1433 03/02/25  0653   WBC 11.90* 15.31* 11.33   HGB 12.3 12.8 10.9*   HCT 38.1 37.5 33.8*    275 235   MCV 91.4 87.8 91.1   RDW 13.5 13.7 13.5    No results for input(s): "PT", "INR", "APTT" in the last 168 hours.       Telemetry:  Appears SR    Physical Exam  Vitals and nursing note reviewed.   Constitutional:       Appearance: Normal appearance.   HENT:      Head: Normocephalic.      Mouth/Throat:      Mouth: Mucous membranes are moist.      Pharynx: Oropharynx is clear.   Cardiovascular:      Rate and Rhythm: Normal rate.      Heart sounds: Normal heart sounds.   Pulmonary:      Effort: Pulmonary effort is normal. No respiratory distress.      Breath sounds: Normal breath sounds. No wheezing or rales.   Abdominal:      Palpations: Abdomen is soft.   Musculoskeletal:      Right lower leg: No edema.      Left lower leg: No edema.   Skin:     General: Skin is warm and dry.   Neurological:      General: No focal deficit present.      Mental Status: She is alert and oriented to person, place, and time. Mental status is at baseline.   Psychiatric:         Behavior: Behavior normal.       Home Medications:   Medications Ordered Prior to Encounter[1]  Current Schedule Inpatient Medications:   aspirin  81 mg Oral Daily    atorvastatin  80 mg Oral Daily    enoxparin  1 mg/kg Subcutaneous Q12H (prophylaxis, 0900/2100)    metoprolol succinate  100 mg Oral Daily    pantoprazole  40 mg Intravenous BID    ticagrelor  90 mg Oral BID           Assessment:   Atrial Flutter (New Onset)- Now  Appears SR    - [id number]: 4    - On FD Lovenox for Stroke Risk Reduction    - TSH Normal   NSTEMI Type IV Post Recent LAD Stenting  Recent NSTEMI Type I (Anterior Wall) (2.27.25)    - CT Angiogram Chest (2.26.25): no central pulmonary arterial filling " defect to suggest pulmonary thromboembolism.  CAD    - Status Post PCI/TIEN Placement Proximal to Mid LAD 80% Lesion (2.27.25)  ICMO/Takotsubo Cardiomyopathy    - EF 35-40% with [place] is akinetic and resembles takosubo syndrome (3.2.25)  Acute Combined Sysotlic & Diastolic HF     - EF 35-40% with Grade I Diastolic Dysfunction  VHD    - MR: Moderate   Epigastric Pain of Unclear Etiology- Possible Gallbladder Etiology    - GI Team Following  Possible Pneumonia- Appears less likely     - On Antibiotics  Anemia  Diverticulosis Coli (CT Imaging)  Leukocytosis (Resolved)  Anxiety  No History of GI Bleed    Plan:   Continue Toprol  Mg PO Daily  Continue DAPT (Aspirin 81 Mg Daily & Brilinta 90 Mg PO BID) Re: LAD Stenting (2.27.25)  Continue FD Lovenox for Stroke Risk Reduction  Replete Potassium & Mag Today  GI Workup in Progress- EGD scheduled for today.  Plan initiation of Eliquis 5 Mg PO BID & Brilinta 90 Mg PO BID at discharge (No Aspirin to avoid triple therapy)  EKG this AM    ASHLEIGH Morrow  Cardiology  [facility]     I personally reviewed the NP history and physical above.  See below MDM component performed by me (Marcelino Ocampo MD):    CAD/PCI    Recent NSTEMI    TIEN to LAD  Takotsubo    Apical HK on echo  PAF (flutter)    She is back in sinus  Abd pains    Recs:  Workup abd pain as planned  Cont Lovenox for now, would use NOAC and Brilinta later (without ASA)  Could consider outpt EP eval for possible ablation  If recurrent fib/flutter while here, then would use IV Amio right away and give PO load         [1]   No current facility-administered medications on file prior to encounter.     Current Outpatient Medications on File Prior to Encounter   Medication Sig Dispense Refill    aspirin (ECOTRIN) 81 MG EC tablet Take 1 tablet (81 mg total) by mouth once daily. 90 tablet 3    atorvastatin (LIPITOR) 80 MG tablet Take 1 tablet (80 mg total) by mouth once daily. 90 tablet 1    losartan (COZAAR) 25 MG  tablet Take 0.5 tablets (12.5 mg total) by mouth once daily. 45 tablet 3    metoprolol succinate (TOPROL-XL) 50 MG 24 hr tablet Take 1 tablet (50 mg total) by mouth once daily. 90 tablet 3    nitroGLYCERIN (NITROSTAT) 0.4 MG SL tablet Place 1 tablet (0.4 mg total) under the tongue every 5 (five) minutes as needed for Chest pain. 10 tablet 1    pantoprazole (PROTONIX) 40 MG tablet Take 1 tablet (40 mg total) by mouth once daily. 30 tablet 0    ticagrelor (BRILINTA) 90 mg tablet Take 1 tablet (90 mg total) by mouth 2 (two) times daily. 60 tablet 11

## 2025-03-03 NOTE — PLAN OF CARE
03/03/25 1149   Discharge Assessment   Assessment Type Discharge Planning Assessment   Confirmed/corrected address, phone number and insurance Yes   Confirmed Demographics Correct on Facesheet   Source of Information patient   When was your last doctors appointment? 02/26/25   Communicated JOANA with patient/caregiver Date not available/Unable to determine   Reason For Admission Abd pain   People in Home spouse   Do you expect to return to your current living situation? Yes   Do you have help at home or someone to help you manage your care at home? Yes   Who are your caregiver(s) and their phone number(s)? Jayant Roy (Spouse)  [phone]   Prior to hospitilization cognitive status: Alert/Oriented   Current cognitive status: Alert/Oriented   Walking or Climbing Stairs Difficulty no   Dressing/Bathing Difficulty no   Equipment Currently Used at Home blood pressure machine   Readmission within 30 days? Yes   Patient currently being followed by outpatient case management? No   Do you currently have service(s) that help you manage your care at home? No   Do you take prescription medications? Yes   Do you have prescription coverage? Yes   Coverage mcr   Do you have any problems affording any of your prescribed medications? No   Is the patient taking medications as prescribed? yes   Who is going to help you get home at discharge? Jayant Roy (Spouse)  [phone] (   How do you get to doctors appointments? car, drives self   Are you on dialysis? No   Do you take coumadin? No   Discharge Plan A Home with family   Discharge Plan B Home with family   DME Needed Upon Discharge  none   Discharge Plan discussed with: Patient;Spouse/sig other   Name(s) and Number(s) Jayant Roy (Spouse)  [phone] (   Transition of Care Barriers None   Physical Activity   On average, how many days per week do you engage in moderate to strenuous exercise (like a brisk walk)? 0 days   On average, how many minutes do you engage in  exercise at this level? 0 min   Financial Resource Strain   How hard is it for you to pay for the very basics like food, housing, medical care, and heating? Not very   Housing Stability   In the last 12 months, was there a time when you were not able to pay the mortgage or rent on time? N   At any time in the past 12 months, were you homeless or living in a shelter (including now)? N   Transportation Needs   Has the lack of transportation kept you from medical appointments, meetings, work or from getting things needed for daily living? No   Food Insecurity   Within the past 12 months, you worried that your food would run out before you got the money to buy more. Never true   Within the past 12 months, the food you bought just didn't last and you didn't have money to get more. Never true   Stress   Do you feel stress - tense, restless, nervous, or anxious, or unable to sleep at night because your mind is troubled all the time - these days? Not at all   Social Isolation   How often do you feel lonely or isolated from those around you?  Never   Alcohol Use   Q1: How often do you have a drink containing alcohol? Monthly or l   Q2: How many drinks containing alcohol do you have on a typical day when you are drinking? 1 or 2   Utilities   In the past 12 months has the electric, gas, oil, or water company threatened to shut off services in your home? No   Health Literacy   How often do you need to have someone help you when you read instructions, pamphlets, or other written material from your doctor or pharmacy? Rarely   OTHER   Name(s) of People in Home Jayant Roy (Spouse)  [phone] (

## 2025-03-04 LAB
ALBUMIN SERPL-MCNC: 3.1 G/DL (ref 3.4–4.8)
ALBUMIN/GLOB SERPL: 1.1 RATIO (ref 1.1–2)
ALP SERPL-CCNC: 78 UNIT/L (ref 40–150)
ALT SERPL-CCNC: 29 UNIT/L (ref 0–55)
ANION GAP SERPL CALC-SCNC: 12 MEQ/L
AST SERPL-CCNC: 23 UNIT/L (ref 5–34)
BILIRUB SERPL-MCNC: 0.5 MG/DL
BUN SERPL-MCNC: 10.7 MG/DL (ref 9.8–20.1)
CALCIUM SERPL-MCNC: 8.9 MG/DL (ref 8.4–10.2)
CHLORIDE SERPL-SCNC: 108 MMOL/L (ref 98–107)
CO2 SERPL-SCNC: 19 MMOL/L (ref 23–31)
CREAT SERPL-MCNC: 0.77 MG/DL (ref 0.55–1.02)
CREAT/UREA NIT SERPL: 14
GFR SERPLBLD CREATININE-BSD FMLA CKD-EPI: >60 ML/MIN/1.73/M2
GLOBULIN SER-MCNC: 2.9 GM/DL (ref 2.4–3.5)
GLUCOSE SERPL-MCNC: 97 MG/DL (ref 82–115)
MAGNESIUM SERPL-MCNC: 2 MG/DL (ref 1.6–2.6)
OHS QRS DURATION: 116 MS
OHS QTC CALCULATION: 504 MS
POTASSIUM SERPL-SCNC: 3.6 MMOL/L (ref 3.5–5.1)
PROT SERPL-MCNC: 6 GM/DL (ref 5.8–7.6)
SODIUM SERPL-SCNC: 139 MMOL/L (ref 136–145)

## 2025-03-04 PROCEDURE — 63600175 PHARM REV CODE 636 W HCPCS: Performed by: NURSE PRACTITIONER

## 2025-03-04 PROCEDURE — 93005 ELECTROCARDIOGRAM TRACING: CPT

## 2025-03-04 PROCEDURE — 99900035 HC TECH TIME PER 15 MIN (STAT)

## 2025-03-04 PROCEDURE — 25000003 PHARM REV CODE 250: Performed by: INTERNAL MEDICINE

## 2025-03-04 PROCEDURE — 25000003 PHARM REV CODE 250: Performed by: NURSE PRACTITIONER

## 2025-03-04 PROCEDURE — 63600175 PHARM REV CODE 636 W HCPCS: Performed by: INTERNAL MEDICINE

## 2025-03-04 PROCEDURE — 25000003 PHARM REV CODE 250

## 2025-03-04 PROCEDURE — 83735 ASSAY OF MAGNESIUM: CPT | Performed by: NURSE PRACTITIONER

## 2025-03-04 PROCEDURE — 36415 COLL VENOUS BLD VENIPUNCTURE: CPT | Performed by: NURSE PRACTITIONER

## 2025-03-04 PROCEDURE — 21400001 HC TELEMETRY ROOM

## 2025-03-04 PROCEDURE — 27000221 HC OXYGEN, UP TO 24 HOURS

## 2025-03-04 PROCEDURE — 93010 ELECTROCARDIOGRAM REPORT: CPT | Mod: ,,, | Performed by: INTERNAL MEDICINE

## 2025-03-04 PROCEDURE — 80053 COMPREHEN METABOLIC PANEL: CPT | Performed by: NURSE PRACTITIONER

## 2025-03-04 PROCEDURE — 99900031 HC PATIENT EDUCATION (STAT)

## 2025-03-04 PROCEDURE — 93010 ELECTROCARDIOGRAM REPORT: CPT | Mod: ,,, | Performed by: STUDENT IN AN ORGANIZED HEALTH CARE EDUCATION/TRAINING PROGRAM

## 2025-03-04 RX ORDER — METOPROLOL SUCCINATE 50 MG/1
150 TABLET, EXTENDED RELEASE ORAL DAILY
Status: DISCONTINUED | OUTPATIENT
Start: 2025-03-05 | End: 2025-03-06 | Stop reason: HOSPADM

## 2025-03-04 RX ORDER — METOPROLOL SUCCINATE 50 MG/1
50 TABLET, EXTENDED RELEASE ORAL ONCE
Status: COMPLETED | OUTPATIENT
Start: 2025-03-04 | End: 2025-03-04

## 2025-03-04 RX ORDER — METOPROLOL TARTRATE 1 MG/ML
5 INJECTION, SOLUTION INTRAVENOUS EVERY 5 MIN PRN
Status: DISCONTINUED | OUTPATIENT
Start: 2025-03-04 | End: 2025-03-06 | Stop reason: HOSPADM

## 2025-03-04 RX ADMIN — ENOXAPARIN SODIUM 50 MG: 60 INJECTION SUBCUTANEOUS at 08:03

## 2025-03-04 RX ADMIN — TICAGRELOR 90 MG: 90 TABLET ORAL at 08:03

## 2025-03-04 RX ADMIN — ASPIRIN 81 MG: 81 TABLET, COATED ORAL at 09:03

## 2025-03-04 RX ADMIN — METOPROLOL TARTRATE 5 MG: 1 INJECTION, SOLUTION INTRAVENOUS at 09:03

## 2025-03-04 RX ADMIN — AMIODARONE HYDROCHLORIDE 0.5 MG/MIN: 1.8 INJECTION, SOLUTION INTRAVENOUS at 11:03

## 2025-03-04 RX ADMIN — AMIODARONE HYDROCHLORIDE 150 MG: 1.5 INJECTION, SOLUTION INTRAVENOUS at 10:03

## 2025-03-04 RX ADMIN — AMIODARONE HYDROCHLORIDE 0.5 MG/MIN: 1.8 INJECTION, SOLUTION INTRAVENOUS at 04:03

## 2025-03-04 RX ADMIN — METOPROLOL SUCCINATE 50 MG: 50 TABLET, EXTENDED RELEASE ORAL at 10:03

## 2025-03-04 RX ADMIN — METOPROLOL SUCCINATE 100 MG: 50 TABLET, EXTENDED RELEASE ORAL at 09:03

## 2025-03-04 RX ADMIN — PANTOPRAZOLE SODIUM 40 MG: 40 INJECTION, POWDER, LYOPHILIZED, FOR SOLUTION INTRAVENOUS at 08:03

## 2025-03-04 RX ADMIN — TICAGRELOR 90 MG: 90 TABLET ORAL at 09:03

## 2025-03-04 RX ADMIN — METOPROLOL TARTRATE 5 MG: 1 INJECTION, SOLUTION INTRAVENOUS at 08:03

## 2025-03-04 RX ADMIN — ENOXAPARIN SODIUM 50 MG: 60 INJECTION SUBCUTANEOUS at 09:03

## 2025-03-04 RX ADMIN — PANTOPRAZOLE SODIUM 40 MG: 40 INJECTION, POWDER, LYOPHILIZED, FOR SOLUTION INTRAVENOUS at 09:03

## 2025-03-04 RX ADMIN — ATORVASTATIN CALCIUM 80 MG: 40 TABLET, FILM COATED ORAL at 09:03

## 2025-03-04 RX ADMIN — AMIODARONE HYDROCHLORIDE 0.5 MG/MIN: 1.8 INJECTION, SOLUTION INTRAVENOUS at 02:03

## 2025-03-04 NOTE — PROGRESS NOTES
"[facility]    CC:  Abdominal pain    HPI 64 y.o. female seen and examined.  Patient with persistent tachycardia.  She continues to have intermittent episodes of severe epigastric chest pain with some radiation to the shoulder.  No nausea or vomiting.  She remains with episodes of tachycardia.  Cardiology is evaluating with stat EKG.  EGD was planned for yesterday but canceled due to tachycardia.    Past Medical History  Past Medical History:   Diagnosis Date    Known health problems: none          Review of Systems  General ROS: negative for chills, fever or weight loss  Cardiovascular ROS: no chest pain or dyspnea on exertion  Gastrointestinal ROS:  Positive for abdominal pain, no diarrhea or overt bleeding    Physical Examination  /84   Pulse (!) 131   Temp 98.6 °F (37 °C) (Oral)   Resp 16   Ht 5' 3" (1.6 m)   Wt 53.1 kg (117 lb)   SpO2 (!) 90%   BMI 20.73 kg/m²   General appearance: alert, appears in some distress at the moment  HENT: Normocephalic, atraumatic, neck symmetrical, no nasal discharge   Lungs: clear to auscultation bilaterally, no dullness to percussion bilaterally  Heart:  Tachycardic, regular rhythm rhythm without rub; no displacement of the PMI   Abdomen: soft, non-tender; bowel sounds normoactive; no organomegaly  Extremities: extremities symmetric; no clubbing, cyanosis, or edema  Neurologic: Alert and oriented X 3, normal strength, normal coordination and gait    Labs:  Lab Results   Component Value Date    WBC 11.33 03/02/2025    HGB 10.9 (L) 03/02/2025    HCT 33.8 (L) 03/02/2025    MCV 91.1 03/02/2025     03/02/2025         Imaging:    I have personally reviewed and interpreted these images.    Assessment:   64-year-old female with acute episodes of epigastric abdominal pain.  CT largely unremarkable as well as ultrasound other than some pericholecystic fluid.  We will attempt to get HIDA scan today if her condition warrants transfer for this " test.  We will still need EGD and plan tentatively for this tomorrow.  We will await Cardiology evaluation for tachycardia.    Plan:  HIDA today if clinical condition allows transfer for this procedure  Tentative plans for EGD tomorrow  NPO after midnight      SHIRA Ortega Jr., M.D.  [facility]

## 2025-03-04 NOTE — AI DETERIORATION ALERT
Artificial Intelligence Notification  [facility]  [address]  [place] [address]  Phone: [phone]    This documentation was triggered by an Artificial Intelligence Notification:    Admit Date: 3/1/2025   LOS: 3  Code Status: Full Code  : 1960  Age: 64 y.o.  Weight:   Wt Readings from Last 1 Encounters:   25 53.1 kg (117 lb)        Sex: female  Bed: [address] [place] [address]  MRN: [id number]  Attending Physician: Nitin Nash MD     Date of Alert: 2025  Time AI Alert Received: 8:17            Vitals:    25 0914   BP: 121/78   Pulse: (!) 118   Resp:    Temp:      SpO2: (!) 90 %      Artificial Intelligence alert discussed with Provider:     Name: YOANDY Griffith    Date/Time of Provider Notification: 9:40      Patient Condition: spoke with YOANDY Griffith, HR is elevated- [facility] rounded on patient, metoprolol 5 mg x2 and will order amio bolus. No ICU intervention at this time.

## 2025-03-04 NOTE — PROGRESS NOTES
Hospital Medicine  Progress Note    Patient Name: Hien Roy  MRN: [id number]  Status: IP- Inpatient   Admission Date: 3/1/2025  Length of Stay: 3  Date of Service: 03/04/2025       CC: hospital follow-up for Aflutter       SUBJECTIVE     64 y.o. female with a history that includes recent NSTEMI s/p PCI TIEN to LAD, with associated cardiomyopathy (EF 40-45%), discharged home on 02/28, presented back to ED on 3/1 with epigastric abd pain radiating to her back. She notes nausea but no vomiting, though has been unable/unwilling to have oral intake due to pain.    Evaluation in the ED noted pressure is borderline low, with the patient developing tachycardic up to 160s.  EKG noting sinus tachycardia.  Labs note white count of 15,300, mild NAGMA, lipase and LFTs are WNL. Troponin continues to trend down since PCI.  Patient admitted to  services, Cardiology and GI consulted.  Patient also found to have Aflutter, started on oral beta-blocker.  However she subsequently developed RVR with rates >200; and was started on an amiodarone infusion.  GI evaluating, planning on endoscopy, but this was delayed due to Flutter/RVR.    Today: Patient seen and examined at bedside, and chart reviewed.  Remains in flutter, rates up to 160-170s this morning.  Improved with metoprolol this morning, though still running mostly in the 130s.      MEDICATIONS   Scheduled   aspirin  81 mg Oral Daily    atorvastatin  80 mg Oral Daily    diphenhydrAMINE  25 mg Intravenous Once    enoxparin  1 mg/kg Subcutaneous Q12H (prophylaxis, 0900/2100)    LIDOcaine (PF) 10 mg/ml (1%)  1 mL Intradermal Once    metoprolol  5 mg Intravenous Once    metoprolol succinate  100 mg Oral Daily    ondansetron  4 mg Oral Once    pantoprazole  40 mg Intravenous BID    ticagrelor  90 mg Oral BID     Continuous Infusions   amiodarone in dextrose 5%  0.5 mg/min Intravenous Continuous 16.7 mL/hr at 03/04/25 0241 0.5 mg/min at 03/04/25 0241    electrolyte-A   Intravenous  Continuous        electrolyte-A   Intravenous Continuous        lactated ringers   Intravenous Continuous             PHYSICAL EXAM   VITALS: T 98.6 °F (37 °C)   /78   P (!) 118   RR 16   O2 (!) 90 %    GENERAL: Awake and moderate disress  LUNGS: CTA anteriorly  CVS: tachycardic, regular at present  GI/: Soft, NT, bowel sounds positive.  EXTREMITIES: No LE edema  NEURO: AAOx3  PSYCH: Cooperative      LABS   CBC  Recent Labs     03/01/25  1433 03/02/25  0653   WBC 15.31* 11.33   RBC 4.27 3.71*   HGB 12.8 10.9*   HCT 37.5 33.8*   MCV 87.8 91.1   MCH 30.0 29.4   MCHC 34.1 32.2*   RDW 13.7 13.5    235     CHEM  Recent Labs     03/01/25  1434 03/02/25  0653 03/02/25  1238 03/03/25  0656    135*  --  140   K 3.9 3.3*  --  3.7   CO2 20* 21*  --  25   BUN 9.0* 9.8  --  10.6   CREATININE 0.76 0.78  --  0.85   GLUCOSE 131* 130*  --  106   CALCIUM 9.4 8.4  --  9.2   MG  --  1.80  --  1.80   ALBUMIN 3.5 3.1*  --   --    GLOBULIN 3.8* 2.6  --   --    ALKPHOS 65 77  --   --    ALT 16 19  --   --    AST 28 25  --   --    BILITOT 1.0 0.9  --   --    LIPASE 21  --   --   --    TSH  --   --  4.143  --          DIAGNOSTICS   Echo    Limited EF to assess function. EF is approximately 35-40%. The apex is akinetic and shows evidence of takosubo. No pericardial effusion noted.    US Abdomen Limited  Narrative:   Grayscale, color and spectral doppler evaluation of the right upper quadrant.  Pancreas is obscured by overlying bowel gas.  Imaged portions of aorta and IVC normal in caliber.  Liver is not significantly enlarged. No focal liver lesion. Normal hepatopetal flow is noted in the portal vein.  No gallstones are seen.  Mild nonspecific gallbladder wall thickening and pericholecystic fluid.  Common bile duct measures 7 mm.  Right kidney measures 10-11 cm in length. No hydronephrosis.  There is a small right pleural effusion.  Impression:   1. No gallstones are seen.  2. Mild nonspecific gallbladder wall thickening  with pericholecystic fluid.  This could relate to fluid overload.  3. Mild extrahepatic biliary ductal dilatation.  Recommend correlation with cholestatic parameters.  4. Small right pleural effusion.  Electronically signed by: Lewis Tillman  Date:    03/02/2025  Time:    09:03        ASSESSMENT   A-flutter with RVR  Epigastric pain, concern for PUD  Recent NSTEMI, s/p TIEN to LAD  Cardiomyopathy, compensated    PLAN   Continue amiodarone infusion, Cardiology on board  Increase oral Toprlol XL to 150 daily; continue IV Lopressor as needed for sustained accelerated rates  Continue monitoring on telemetry  Planning for further GI work up, possible EGD, HIDA once HR controlled  Continue PPI BID for now, appreciate GI assistance  Otherwise continue current management and close monitoring in the interim      Prophylaxis: FD Raciel Osborne MD  [facility]    Critical Care Time: 35 minutes spent in direct hands on care, review of labs, imaging and medical record, and discussion of diagnosis, treatment, prognosis with patient/family.  Patient remains at high-risk for clinical decompensation  Critical Care diagnosis:  A flutter with RVR, requiring amiodarone infusion

## 2025-03-04 NOTE — PROGRESS NOTES
[facility]    Cardiology  Progress Note    Patient Name: Hien Roy  MRN: [id number]  Admission Date: 3/1/2025  Hospital Length of Stay: 3 days  Code Status: Full Code   Attending Provider: Nitin Nash MD   Consulting Provider: ASHLEIGH Morrow  Primary Care Physician: Deana Jaimes FNP  Principal Problem:<principal problem not specified>    Patient information was obtained from patient, past medical records, ER records, and primary team.     Subjective:   Chief Complaint/Reason for Consult: Epigastric Pain & AFL    HPI:   Ms. Roy is a 64 year old female, known to [facility] through recent hospitalization, who presented to the hospital with upper abdominal pain. Patient with recent history of presenting NSTEMI where she underwent LAD Stenting and found to have Takotsubo Cardiomyopathy with EF ~ 45%. Patient was placed on HF Medications including DAPT Post PCI and discharged on 25. Patient reported that abdominal discomfort worsened at home to the point where she could not eat or sleep. She had CTA chest and CT abdomen/pelvis while in the hospital that were  Unremarkable. Lab work up is stable, although troponin value is elevated but down from recent peak (prob due to recent PCI). Lactate normal. EKG revealed atrial flutter- new diagnosis. Respiratory panel negative. Cardizem infusion was started for acute HR Control. CXR revealed Small right basilar infiltrate and associated small effusion are present. Patient admitted to Hospital Medicine Service. [facility] consulted for cardiac evaluation/management.     Hospital Course:  3.3.25: NAD Noted. Vitals Stable. AFL versus SR in the 90's. BP Stable. Reports epigastric discomfort. EGD Planned for today.  3.4.25: NAD Noted. BP Stable. AFL RVR this morning, IV Lopressor total of 10 Mg IV Given.    PMH: NSTEMI, CAD/PCI LAD, Takotsubo Cardiomyopathy, Anxiety  PSH: , Wrist Surgery, LHC/PCI  Family History: None  Social  History: Tobacco- Negative, Alcohol- Occasional Use, Substance Abuse- Negative     Previous Cardiac Diagnostics:   Echocardiogram (3.2.25):  Limited EF to assess function. EF is approximately 35-40%. The apex is akinetic and shows evidence of takosubo. No pericardial effusion noted.     CT Angiogram Abdomen & Pelvis (3.1.25):  No evidence of mesenteric ischemia.  Patent mesenteric arterial vasculature.  Small bilateral pleural effusions.  Diffuse gallbladder wall thickening with small amount of high density material layering dependently.  Findings may be secondary to volume overload.  If there is concern for gallbladder pathology such is cholecystitis, recommend right upper quadrant ultrasound.  However, the CT findings are nonspecific.  Colonic diverticuli.  No diverticulitis.    Echocardiogram (2.27.25):  Left Ventricle: The left ventricle is normal in size. Normal wall thickness. There is mildly reduced systolic function with a visually estimated ejection fraction of 40 - 45%. Grade I diastolic dysfunction. [place] is akinetic and resembles takosubo syndrome  Right Ventricle: The right ventricle is normal in size. Systolic function is normal. TAPSE is 1.68 cm.  Mitral Valve: Mildly thickened leaflets. There is moderate regurgitation.  Tricuspid Valve: There is mild regurgitation.  Pericardium: There is no pericardial effusion.     [facility] with PCI (2.27.25):  Coronary findings:  Dominance: right   Left main:  Patent vessel that bifurcates into a LAD and circumflex system.  Left anterior descending artery:  Large type 2 vessel with diffuse proximal to mid segment stenosis up to 80% by IVUS.  1st diagonal is a moderate-sized vessel that originates proximal to the stenosis.  Circumflex artery:  Patent nondominant vessel that gives rise to a large obtuse marginal-1.  The AV groove artery arises before the obtuse marginal and terminates in a small terminal OM-2  Right coronary artery:  Patent dominant vessel terminates in a  PDA and a moderate-sized JHONATHAN  Left ventriculography:  The ejection fraction was 40% with apical ballooning suggestive of takotsubo. The EDP was 17 mmHg.   Intervention: Proximal to mid LAD 80% stenosis reduced to 0% following IVUS guided TIEN using a 3.5 x 34 marcela post dilated proximally with a 4 x 6 NC.  The moderate-sized diagonal was placed in stent long term with KAYE 3 flow throughout the vessel at the completion of the procedure.      CT Angiogram Chest (2.26.25):  FINDINGS:  There is no central pulmonary arterial filling defect to suggest pulmonary thromboembolism.  Cardiac chamber sizes are within normal limits.  There is no pleural or pericardial fluid.  There are no enlarged intrathoracic lymph nodes.  There are subcentimeter bilateral hilar short axis lymph nodes.  There is no dense lobar consolidation or suspicious focal pulmonary parenchymal finding.  There is no acute finding in the included abdomen. There is no acute finding in the included body wall. There is no acute finding in the included base of the neck.  Impression:  No convincing evidence of pulmonary thromboembolism.  There are nonspecific subcentimeter bilateral hilar short axis lymph nodes.    Review of patient's allergies indicates:   Allergen Reactions    Meperidine Shortness Of Breath    Sulfa (sulfonamide antibiotics) Shortness Of Breath     No current facility-administered medications on file prior to encounter.     Current Outpatient Medications on File Prior to Encounter   Medication Sig    aspirin (ECOTRIN) 81 MG EC tablet Take 1 tablet (81 mg total) by mouth once daily.    atorvastatin (LIPITOR) 80 MG tablet Take 1 tablet (80 mg total) by mouth once daily.    losartan (COZAAR) 25 MG tablet Take 0.5 tablets (12.5 mg total) by mouth once daily.    metoprolol succinate (TOPROL-XL) 50 MG 24 hr tablet Take 1 tablet (50 mg total) by mouth once daily.    nitroGLYCERIN (NITROSTAT) 0.4 MG SL tablet Place 1 tablet (0.4 mg total) under the tongue  every 5 (five) minutes as needed for Chest pain.    pantoprazole (PROTONIX) 40 MG tablet Take 1 tablet (40 mg total) by mouth once daily.    ticagrelor (BRILINTA) 90 mg tablet Take 1 tablet (90 mg total) by mouth 2 (two) times daily.     Review of Systems   Respiratory:  Negative for chest tightness and shortness of breath.    Cardiovascular:  Negative for chest pain.   All other systems reviewed and are negative.    Objective:     Vital Signs (Most Recent):  Temp: 98.6 °F (37 °C) (03/04/25 0805)  Pulse: 85 (03/04/25 1006)  Resp: 16 (03/04/25 0413)  BP: 131/77 (03/04/25 1006)  SpO2: 98 % (03/04/25 1003) Vital Signs (24h Range):  Temp:  [98.1 °F (36.7 °C)-99.2 °F (37.3 °C)] 98.6 °F (37 °C)  Pulse:  [] 85  Resp:  [16-21] 16  SpO2:  [66 %-100 %] 98 %  BP: ()/(32-94) 131/77   Weight: 53.1 kg (117 lb)  Body mass index is 20.73 kg/m².  SpO2: 98 %     No intake or output data in the 24 hours ending 03/04/25 1012    Lines/Drains/Airways       Peripheral Intravenous Line  Duration                  Peripheral IV - Single Lumen 03/02/25 0144 22 G No Anterior;Left;Proximal Forearm 2 days         Peripheral IV - Single Lumen 03/03/25 1100 18 G Anterior;Right Forearm <1 day                  Significant Labs:   Chemistries:   Recent Labs   Lab 02/27/25  0637 02/28/25  0307 03/01/25  1434 03/01/25  1727 03/02/25  0051 03/02/25  0653 03/02/25  1238 03/03/25  0656    139 137  --   --  135*  --  140   K 3.9 3.9 3.9  --   --  3.3*  --  3.7    109* 106  --   --  104  --  105   CO2 23 21* 20*  --   --  21*  --  25   BUN 11.5 8.6* 9.0*  --   --  9.8  --  10.6   CREATININE 0.95 0.79 0.76  --   --  0.78  --  0.85   CALCIUM 9.5 8.3* 9.4  --   --  8.4  --  9.2   BILITOT 0.7 0.6 1.0  --   --  0.9  --   --    ALKPHOS 55 45 65  --   --  77  --   --    ALT 13 11 16  --   --  19  --   --    AST 49* 40* 28  --   --  25  --   --    GLUCOSE 95 97 131*  --   --  130*  --  106   MG 1.80 2.20  --   --   --  1.80  --  1.80  "  TROPONINI 6.143*  --  1.387* 1.426* 1.265* 0.913* 0.917*  --         CBC/Anemia Labs: Coags:    Recent Labs   Lab 02/28/25  0307 03/01/25  1433 03/02/25  0653   WBC 11.90* 15.31* 11.33   HGB 12.3 12.8 10.9*   HCT 38.1 37.5 33.8*    275 235   MCV 91.4 87.8 91.1   RDW 13.5 13.7 13.5    No results for input(s): "PT", "INR", "APTT" in the last 168 hours.     EKG:      Telemetry:  [facility] RVR    Physical Exam  Vitals and nursing note reviewed.   Constitutional:       Appearance: Normal appearance.   HENT:      Head: Normocephalic.      Mouth/Throat:      Mouth: Mucous membranes are moist.      Pharynx: Oropharynx is clear.   Cardiovascular:      Rate and Rhythm: Tachycardia present. Rhythm irregular.      Heart sounds: Normal heart sounds.   Pulmonary:      Effort: Pulmonary effort is normal. No respiratory distress.      Breath sounds: Normal breath sounds. No wheezing or rales.   Abdominal:      Palpations: Abdomen is soft.   Musculoskeletal:      Right lower leg: No edema.      Left lower leg: No edema.   Skin:     General: Skin is warm and dry.   Neurological:      General: No focal deficit present.      Mental Status: She is alert and oriented to person, place, and time. Mental status is at baseline.   Psychiatric:         Behavior: Behavior normal.       Home Medications:   Medications Ordered Prior to Encounter[1]  Current Schedule Inpatient Medications:   amiodarone in dextrose  150 mg Intravenous Once    aspirin  81 mg Oral Daily    atorvastatin  80 mg Oral Daily    diphenhydrAMINE  25 mg Intravenous Once    enoxparin  1 mg/kg Subcutaneous Q12H (prophylaxis, 0900/2100)    LIDOcaine (PF) 10 mg/ml (1%)  1 mL Intradermal Once    metoprolol  5 mg Intravenous Once    [START ON 3/5/2025] metoprolol succinate  150 mg Oral Daily    ondansetron  4 mg Oral Once    pantoprazole  40 mg Intravenous BID    ticagrelor  90 mg Oral BID        amiodarone in dextrose 5%  0.5 mg/min Intravenous Continuous 16.7 mL/hr at " 03/04/25 0241 0.5 mg/min at 03/04/25 0241    electrolyte-A   Intravenous Continuous        electrolyte-A   Intravenous Continuous        lactated ringers   Intravenous Continuous           Assessment:   Atrial Flutter (New Onset)- Now  AFL RVR    - [id number]: 4    - On FD Lovenox for Stroke Risk Reduction    - TSH Normal   NSTEMI Type IV Post Recent LAD Stenting  Recent NSTEMI Type I (Anterior Wall) (2.27.25)    - CT Angiogram Chest (2.26.25): no central pulmonary arterial filling defect to suggest pulmonary thromboembolism.  CAD    - Status Post PCI/TIEN Placement Proximal to Mid LAD 80% Lesion (2.27.25)  ICMO/Takotsubo Cardiomyopathy    - EF 35-40% with [place] is akinetic and resembles takosubo syndrome (3.2.25)  Acute Combined Sysotlic & Diastolic HF     - EF 35-40% with Grade I Diastolic Dysfunction  VHD    - MR: Moderate   Epigastric Pain of Unclear Etiology- Possible Gallbladder Etiology    - GI Team Following  Possible Pneumonia- Appears less likely     - On Antibiotics  Anemia  Diverticulosis Coli (CT Imaging)  Leukocytosis (Resolved)  Anxiety  No History of GI Bleed    Plan:   Give Amiodarone 150 Mg IVP x 1 Dose. Continue Amiodarone Infusion at 0.5 Mg/Min Set Rate  Advance Toprol XL to 150 Mg PO Daily  DC Aspirin 81 Mg Daily & Continue Brilinta 90 Mg PO BID) Re: LAD Stenting (2.27.25)  Plan initiation of Eliquis 5 Mg PO BID & Brilinta 90 Mg PO BID at discharge (No Aspirin to avoid triple therapy)  Continue FD Lovenox for Stroke Risk Reduction  GI Workup in Progress- EGD timing as per GI Team  EKG this AM Reviewed  Check CMP/Mag this AM    ASHLEIGH Morrow  Cardiology  [facility]          [1]   No current facility-administered medications on file prior to encounter.     Current Outpatient Medications on File Prior to Encounter   Medication Sig Dispense Refill    aspirin (ECOTRIN) 81 MG EC tablet Take 1 tablet (81 mg total) by mouth once daily. 90 tablet 3    atorvastatin (LIPITOR) 80 MG tablet  Take 1 tablet (80 mg total) by mouth once daily. 90 tablet 1    losartan (COZAAR) 25 MG tablet Take 0.5 tablets (12.5 mg total) by mouth once daily. 45 tablet 3    metoprolol succinate (TOPROL-XL) 50 MG 24 hr tablet Take 1 tablet (50 mg total) by mouth once daily. 90 tablet 3    nitroGLYCERIN (NITROSTAT) 0.4 MG SL tablet Place 1 tablet (0.4 mg total) under the tongue every 5 (five) minutes as needed for Chest pain. 10 tablet 1    pantoprazole (PROTONIX) 40 MG tablet Take 1 tablet (40 mg total) by mouth once daily. 30 tablet 0    ticagrelor (BRILINTA) 90 mg tablet Take 1 tablet (90 mg total) by mouth 2 (two) times daily. 60 tablet 11

## 2025-03-05 ENCOUNTER — PATIENT OUTREACH (OUTPATIENT)
Dept: ADMINISTRATIVE | Facility: CLINIC | Age: 65
End: 2025-03-05
Payer: MEDICARE

## 2025-03-05 LAB
ANION GAP SERPL CALC-SCNC: 12 MEQ/L
BASOPHILS # BLD AUTO: 0.04 X10(3)/MCL
BASOPHILS NFR BLD AUTO: 0.5 %
BUN SERPL-MCNC: 8.3 MG/DL (ref 9.8–20.1)
CALCIUM SERPL-MCNC: 8.8 MG/DL (ref 8.4–10.2)
CHLORIDE SERPL-SCNC: 108 MMOL/L (ref 98–107)
CO2 SERPL-SCNC: 19 MMOL/L (ref 23–31)
CREAT SERPL-MCNC: 0.76 MG/DL (ref 0.55–1.02)
CREAT/UREA NIT SERPL: 11
EOSINOPHIL # BLD AUTO: 0.1 X10(3)/MCL (ref 0–0.9)
EOSINOPHIL NFR BLD AUTO: 1.3 %
ERYTHROCYTE [DISTWIDTH] IN BLOOD BY AUTOMATED COUNT: 13.2 % (ref 11.5–17)
GFR SERPLBLD CREATININE-BSD FMLA CKD-EPI: >60 ML/MIN/1.73/M2
GLUCOSE SERPL-MCNC: 99 MG/DL (ref 82–115)
HCT VFR BLD AUTO: 36.4 % (ref 37–47)
HGB BLD-MCNC: 11.8 G/DL (ref 12–16)
IMM GRANULOCYTES # BLD AUTO: 0.04 X10(3)/MCL (ref 0–0.04)
IMM GRANULOCYTES NFR BLD AUTO: 0.5 %
LYMPHOCYTES # BLD AUTO: 0.6 X10(3)/MCL (ref 0.6–4.6)
LYMPHOCYTES NFR BLD AUTO: 7.8 %
MAGNESIUM SERPL-MCNC: 1.9 MG/DL (ref 1.6–2.6)
MCH RBC QN AUTO: 29.1 PG (ref 27–31)
MCHC RBC AUTO-ENTMCNC: 32.4 G/DL (ref 33–36)
MCV RBC AUTO: 89.7 FL (ref 80–94)
MONOCYTES # BLD AUTO: 0.91 X10(3)/MCL (ref 0.1–1.3)
MONOCYTES NFR BLD AUTO: 11.9 %
NEUTROPHILS # BLD AUTO: 5.97 X10(3)/MCL (ref 2.1–9.2)
NEUTROPHILS NFR BLD AUTO: 78 %
NRBC BLD AUTO-RTO: 0 %
PLATELET # BLD AUTO: 339 X10(3)/MCL (ref 130–400)
PMV BLD AUTO: 11.8 FL (ref 7.4–10.4)
POTASSIUM SERPL-SCNC: 3.7 MMOL/L (ref 3.5–5.1)
RBC # BLD AUTO: 4.06 X10(6)/MCL (ref 4.2–5.4)
SODIUM SERPL-SCNC: 139 MMOL/L (ref 136–145)
WBC # BLD AUTO: 7.66 X10(3)/MCL (ref 4.5–11.5)

## 2025-03-05 PROCEDURE — 63600175 PHARM REV CODE 636 W HCPCS: Performed by: NURSE PRACTITIONER

## 2025-03-05 PROCEDURE — 36415 COLL VENOUS BLD VENIPUNCTURE: CPT | Performed by: INTERNAL MEDICINE

## 2025-03-05 PROCEDURE — 80048 BASIC METABOLIC PNL TOTAL CA: CPT | Performed by: INTERNAL MEDICINE

## 2025-03-05 PROCEDURE — 25000003 PHARM REV CODE 250

## 2025-03-05 PROCEDURE — 21400001 HC TELEMETRY ROOM

## 2025-03-05 PROCEDURE — 85025 COMPLETE CBC W/AUTO DIFF WBC: CPT | Performed by: INTERNAL MEDICINE

## 2025-03-05 PROCEDURE — 94760 N-INVAS EAR/PLS OXIMETRY 1: CPT

## 2025-03-05 PROCEDURE — 63600175 PHARM REV CODE 636 W HCPCS: Performed by: INTERNAL MEDICINE

## 2025-03-05 PROCEDURE — 25000003 PHARM REV CODE 250: Performed by: NURSE PRACTITIONER

## 2025-03-05 PROCEDURE — 25000003 PHARM REV CODE 250: Performed by: INTERNAL MEDICINE

## 2025-03-05 PROCEDURE — 83735 ASSAY OF MAGNESIUM: CPT | Performed by: INTERNAL MEDICINE

## 2025-03-05 PROCEDURE — 27000221 HC OXYGEN, UP TO 24 HOURS

## 2025-03-05 PROCEDURE — 99900035 HC TECH TIME PER 15 MIN (STAT)

## 2025-03-05 RX ORDER — AMIODARONE HYDROCHLORIDE 200 MG/1
400 TABLET ORAL 2 TIMES DAILY
Status: DISCONTINUED | OUTPATIENT
Start: 2025-03-05 | End: 2025-03-06 | Stop reason: HOSPADM

## 2025-03-05 RX ORDER — AMIODARONE HYDROCHLORIDE 200 MG/1
200 TABLET ORAL 2 TIMES DAILY
Status: DISCONTINUED | OUTPATIENT
Start: 2025-03-08 | End: 2025-03-06 | Stop reason: HOSPADM

## 2025-03-05 RX ORDER — AMIODARONE HYDROCHLORIDE 200 MG/1
200 TABLET ORAL DAILY
Status: DISCONTINUED | OUTPATIENT
Start: 2025-03-11 | End: 2025-03-06 | Stop reason: HOSPADM

## 2025-03-05 RX ORDER — POTASSIUM CHLORIDE 20 MEQ/1
40 TABLET, EXTENDED RELEASE ORAL ONCE
Status: COMPLETED | OUTPATIENT
Start: 2025-03-05 | End: 2025-03-05

## 2025-03-05 RX ORDER — MAGNESIUM SULFATE HEPTAHYDRATE 40 MG/ML
2 INJECTION, SOLUTION INTRAVENOUS ONCE
Status: COMPLETED | OUTPATIENT
Start: 2025-03-05 | End: 2025-03-05

## 2025-03-05 RX ORDER — LOPERAMIDE HYDROCHLORIDE 2 MG/1
2 CAPSULE ORAL 4 TIMES DAILY PRN
Status: DISCONTINUED | OUTPATIENT
Start: 2025-03-05 | End: 2025-03-06 | Stop reason: HOSPADM

## 2025-03-05 RX ADMIN — TICAGRELOR 90 MG: 90 TABLET ORAL at 08:03

## 2025-03-05 RX ADMIN — LOSARTAN POTASSIUM 12.5 MG: 25 TABLET, FILM COATED ORAL at 11:03

## 2025-03-05 RX ADMIN — PANTOPRAZOLE SODIUM 40 MG: 40 INJECTION, POWDER, LYOPHILIZED, FOR SOLUTION INTRAVENOUS at 09:03

## 2025-03-05 RX ADMIN — LOPERAMIDE HYDROCHLORIDE 2 MG: 2 CAPSULE ORAL at 07:03

## 2025-03-05 RX ADMIN — AMIODARONE HYDROCHLORIDE 400 MG: 200 TABLET ORAL at 11:03

## 2025-03-05 RX ADMIN — ENOXAPARIN SODIUM 50 MG: 60 INJECTION SUBCUTANEOUS at 09:03

## 2025-03-05 RX ADMIN — MAGNESIUM SULFATE HEPTAHYDRATE 2 G: 40 INJECTION, SOLUTION INTRAVENOUS at 11:03

## 2025-03-05 RX ADMIN — APIXABAN 5 MG: 5 TABLET, FILM COATED ORAL at 08:03

## 2025-03-05 RX ADMIN — LOPERAMIDE HYDROCHLORIDE 2 MG: 2 CAPSULE ORAL at 01:03

## 2025-03-05 RX ADMIN — AMIODARONE HYDROCHLORIDE 400 MG: 200 TABLET ORAL at 08:03

## 2025-03-05 RX ADMIN — PANTOPRAZOLE SODIUM 40 MG: 40 INJECTION, POWDER, LYOPHILIZED, FOR SOLUTION INTRAVENOUS at 08:03

## 2025-03-05 RX ADMIN — ATORVASTATIN CALCIUM 80 MG: 40 TABLET, FILM COATED ORAL at 08:03

## 2025-03-05 RX ADMIN — POTASSIUM CHLORIDE 40 MEQ: 1500 TABLET, EXTENDED RELEASE ORAL at 11:03

## 2025-03-05 RX ADMIN — METOPROLOL SUCCINATE 150 MG: 50 TABLET, EXTENDED RELEASE ORAL at 08:03

## 2025-03-05 RX ADMIN — AMIODARONE HYDROCHLORIDE 0.5 MG/MIN: 1.8 INJECTION, SOLUTION INTRAVENOUS at 03:03

## 2025-03-05 NOTE — PROGRESS NOTES
Hospital Medicine  Progress Note    Patient Name: Hien Roy  MRN: [id number]  Status: IP- Inpatient   Admission Date: 3/1/2025  Length of Stay: 4  Date of Service: 03/05/2025       CC: hospital follow-up for Aflutter       SUBJECTIVE     64 y.o. female with a history that includes recent NSTEMI s/p PCI TIEN to LAD, with associated cardiomyopathy (EF 40-45%), discharged home on 02/28, presented back to ED on 3/1 with epigastric abd pain radiating to her back. She notes nausea but no vomiting, though has been unable/unwilling to have oral intake due to pain.    Evaluation in the ED noted pressure is borderline low, with the patient developing tachycardic up to 160s.  EKG noting sinus tachycardia.  Labs note white count of 15,300, mild NAGMA, lipase and LFTs are WNL. Troponin continues to trend down since PCI.  Patient admitted to  services, Cardiology and GI consulted.  Patient also found to have Aflutter, started on oral beta-blocker.  However she subsequently developed RVR with rates >200; and was started on an amiodarone infusion.  GI evaluating, planning on endoscopy, but this was delayed due to Flutter/RVR.      Today: Patient seen and examined at bedside, and chart reviewed.  Patient reports that since yesterday afternoon she has been feeling back to her normal self, which she had felt like since prior to her NSTEMI.  Telemetry notes she is now back in sinus rhythm in the 80s; she remains on amiodarone infusion.  Patient currently declining GI workup, stating she now feel better with control of Aflutter, and is seems very anxious at the mention of any procedure.      MEDICATIONS   Scheduled   atorvastatin  80 mg Oral Daily    enoxparin  1 mg/kg Subcutaneous Q12H (prophylaxis, 0900/2100)    LIDOcaine (PF) 10 mg/ml (1%)  1 mL Intradermal Once    metoprolol succinate  150 mg Oral Daily    ondansetron  4 mg Oral Once    pantoprazole  40 mg Intravenous BID    ticagrelor  90 mg Oral BID     Continuous Infusions    amiodarone in dextrose 5%  0.5 mg/min Intravenous Continuous 16.7 mL/hr at 03/05/25 0317 0.5 mg/min at 03/05/25 0317    electrolyte-A   Intravenous Continuous        electrolyte-A   Intravenous Continuous        lactated ringers   Intravenous Continuous             PHYSICAL EXAM   VITALS: T 98.3 °F (36.8 °C)   /82   P 81   RR 18   O2 100 %    GENERAL: Awake and moderate disress  LUNGS: CTA anteriorly  CVS: tachycardic, regular at present  GI/: Soft, NT, bowel sounds positive.  EXTREMITIES: No LE edema  NEURO: AAOx3  PSYCH: Cooperative      LABS   CBC  Recent Labs     03/05/25 0313   WBC 7.66   RBC 4.06*   HGB 11.8*   HCT 36.4*   MCV 89.7   MCH 29.1   MCHC 32.4*   RDW 13.2        CHEM  Recent Labs     03/02/25  1238 03/03/25  0656 03/04/25  1045 03/05/25 0313   NA  --    < > 139 139   K  --    < > 3.6 3.7   CO2  --    < > 19* 19*   BUN  --    < > 10.7 8.3*   CREATININE  --    < > 0.77 0.76   GLUCOSE  --    < > 97 99   CALCIUM  --    < > 8.9 8.8   MG  --    < > 2.00 1.90   ALBUMIN  --   --  3.1*  --    GLOBULIN  --   --  2.9  --    ALKPHOS  --   --  78  --    ALT  --   --  29  --    AST  --   --  23  --    BILITOT  --   --  0.5  --    TSH 4.143  --   --   --     < > = values in this interval not displayed.         DIAGNOSTICS   Echo    Limited EF to assess function. EF is approximately 35-40%. The apex is akinetic and shows evidence of takosubo. No pericardial effusion noted.    US Abdomen Limited  Narrative:   Grayscale, color and spectral doppler evaluation of the right upper quadrant.  Pancreas is obscured by overlying bowel gas.  Imaged portions of aorta and IVC normal in caliber.  Liver is not significantly enlarged. No focal liver lesion. Normal hepatopetal flow is noted in the portal vein.  No gallstones are seen.  Mild nonspecific gallbladder wall thickening and pericholecystic fluid.  Common bile duct measures 7 mm.  Right kidney measures 10-11 cm in length. No hydronephrosis.  There is a  small right pleural effusion.  Impression:   1. No gallstones are seen.  2. Mild nonspecific gallbladder wall thickening with pericholecystic fluid.  This could relate to fluid overload.  3. Mild extrahepatic biliary ductal dilatation.  Recommend correlation with cholestatic parameters.  4. Small right pleural effusion.  Electronically signed by: Lewis Tillman  Date:    03/02/2025  Time:    09:03        ASSESSMENT   A-flutter with RVR  Epigastric pain, concern for gastritis/PUD  Recent NSTEMI, s/p TIEN to LAD  Cardiomyopathy, compensated    PLAN   Continue amiodarone infusion, transition to oral load per Cardiology  Continue current Toprlol  Could consider outpatient EP evaluation for ablation  Continue monitoring on telemetry  Can hold off GI work up for now at patient's request, will monitor and arrange for outpatient GI follow up  Will continue PPI BID in the interim  Otherwise continue current management and close monitoring in the interim      Prophylaxis: FD Lovenox        Dalton Osborne MD  [facility]    Critical Care Time: 35 minutes spent in direct hands on care, review of labs, imaging and medical record, and discussion of diagnosis, treatment, prognosis with patient/family.  Patient remains at high-risk for clinical decompensation  Critical Care diagnosis:  A flutter with RVR, requiring amiodarone infusion

## 2025-03-05 NOTE — PROGRESS NOTES
[facility]    Cardiology  Progress Note    Patient Name: Hien Roy  MRN: [id number]  Admission Date: 3/1/2025  Hospital Length of Stay: 4 days  Code Status: Full Code   Attending Provider: Nitin Nash MD   Consulting Provider: ASHLEIGH Morrow  Primary Care Physician: Deana Jaimes FNP  Principal Problem:<principal problem not specified>    Patient information was obtained from patient, past medical records, ER records, and primary team.     Subjective:   Chief Complaint/Reason for Consult: Epigastric Pain & AFL    HPI:   Ms. Roy is a 64 year old female, known to [facility] through recent hospitalization, who presented to the hospital with upper abdominal pain. Patient with recent history of presenting NSTEMI where she underwent LAD Stenting and found to have Takotsubo Cardiomyopathy with EF ~ 45%. Patient was placed on HF Medications including DAPT Post PCI and discharged on 2.28.25. Patient reported that abdominal discomfort worsened at home to the point where she could not eat or sleep. She had CTA chest and CT abdomen/pelvis while in the hospital that were  Unremarkable. Lab work up is stable, although troponin value is elevated but down from recent peak (prob due to recent PCI). Lactate normal. EKG revealed atrial flutter- new diagnosis. Respiratory panel negative. Cardizem infusion was started for acute HR Control. CXR revealed Small right basilar infiltrate and associated small effusion are present. Patient admitted to Hospital Medicine Service. [facility] consulted for cardiac evaluation/management.     Hospital Course:  3.3.25: NAD Noted. Vitals Stable. AFL versus SR in the 90's. BP Stable. Reports epigastric discomfort. EGD Planned for today.  3.4.25: NAD Noted. BP Stable. AFL RVR this morning, IV Lopressor total of 10 Mg IV Given.  3.5.25: NAD Noted. Vitals Stable. SR on Tele. BP Stable. Patient in good spirits. Wishes to defer endoscopy at this time. On  Amiodarone Infusion.    PMH: NSTEMI, CAD/PCI LAD, Takotsubo Cardiomyopathy, Anxiety  PSH: , Wrist Surgery, LHC/PCI  Family History: None  Social History: Tobacco- Negative, Alcohol- Occasional Use, Substance Abuse- Negative     Previous Cardiac Diagnostics:   Echocardiogram (3.2.25):  Limited EF to assess function. EF is approximately 35-40%. The apex is akinetic and shows evidence of takosubo. No pericardial effusion noted.     CT Angiogram Abdomen & Pelvis (3.1.25):  No evidence of mesenteric ischemia.  Patent mesenteric arterial vasculature.  Small bilateral pleural effusions.  Diffuse gallbladder wall thickening with small amount of high density material layering dependently.  Findings may be secondary to volume overload.  If there is concern for gallbladder pathology such is cholecystitis, recommend right upper quadrant ultrasound.  However, the CT findings are nonspecific.  Colonic diverticuli.  No diverticulitis.    Echocardiogram (25):  Left Ventricle: The left ventricle is normal in size. Normal wall thickness. There is mildly reduced systolic function with a visually estimated ejection fraction of 40 - 45%. Grade I diastolic dysfunction. [place] is akinetic and resembles takosubo syndrome  Right Ventricle: The right ventricle is normal in size. Systolic function is normal. TAPSE is 1.68 cm.  Mitral Valve: Mildly thickened leaflets. There is moderate regurgitation.  Tricuspid Valve: There is mild regurgitation.  Pericardium: There is no pericardial effusion.     LHC with PCI (25):  Coronary findings:  Dominance: right   Left main:  Patent vessel that bifurcates into a LAD and circumflex system.  Left anterior descending artery:  Large type 2 vessel with diffuse proximal to mid segment stenosis up to 80% by IVUS.  1st diagonal is a moderate-sized vessel that originates proximal to the stenosis.  Circumflex artery:  Patent nondominant vessel that gives rise to a large obtuse marginal-1.  The  AV groove artery arises before the obtuse marginal and terminates in a small terminal OM-2  Right coronary artery:  Patent dominant vessel terminates in a PDA and a moderate-sized JHONATHAN  Left ventriculography:  The ejection fraction was 40% with apical ballooning suggestive of takotsubo. The EDP was 17 mmHg.   Intervention: Proximal to mid LAD 80% stenosis reduced to 0% following IVUS guided TIEN using a 3.5 x 34 marcela post dilated proximally with a 4 x 6 NC.  The moderate-sized diagonal was placed in stent shelter with KAYE 3 flow throughout the vessel at the completion of the procedure.      CT Angiogram Chest (2.26.25):  FINDINGS:  There is no central pulmonary arterial filling defect to suggest pulmonary thromboembolism.  Cardiac chamber sizes are within normal limits.  There is no pleural or pericardial fluid.  There are no enlarged intrathoracic lymph nodes.  There are subcentimeter bilateral hilar short axis lymph nodes.  There is no dense lobar consolidation or suspicious focal pulmonary parenchymal finding.  There is no acute finding in the included abdomen. There is no acute finding in the included body wall. There is no acute finding in the included base of the neck.  Impression:  No convincing evidence of pulmonary thromboembolism.  There are nonspecific subcentimeter bilateral hilar short axis lymph nodes.    Review of patient's allergies indicates:   Allergen Reactions    Meperidine Shortness Of Breath    Sulfa (sulfonamide antibiotics) Shortness Of Breath     No current facility-administered medications on file prior to encounter.     Current Outpatient Medications on File Prior to Encounter   Medication Sig    aspirin (ECOTRIN) 81 MG EC tablet Take 1 tablet (81 mg total) by mouth once daily.    atorvastatin (LIPITOR) 80 MG tablet Take 1 tablet (80 mg total) by mouth once daily.    losartan (COZAAR) 25 MG tablet Take 0.5 tablets (12.5 mg total) by mouth once daily.    metoprolol succinate (TOPROL-XL) 50 MG  24 hr tablet Take 1 tablet (50 mg total) by mouth once daily.    nitroGLYCERIN (NITROSTAT) 0.4 MG SL tablet Place 1 tablet (0.4 mg total) under the tongue every 5 (five) minutes as needed for Chest pain.    pantoprazole (PROTONIX) 40 MG tablet Take 1 tablet (40 mg total) by mouth once daily.    ticagrelor (BRILINTA) 90 mg tablet Take 1 tablet (90 mg total) by mouth 2 (two) times daily.     Review of Systems   Respiratory:  Negative for chest tightness and shortness of breath.    Cardiovascular:  Negative for chest pain.   All other systems reviewed and are negative.    Objective:     Vital Signs (Most Recent):  Temp: 98.3 °F (36.8 °C) (03/05/25 0738)  Pulse: 81 (03/05/25 0859)  Resp: 18 (03/05/25 0738)  BP: 127/82 (03/05/25 0859)  SpO2: 99 % (03/05/25 0931) Vital Signs (24h Range):  Temp:  [96.2 °F (35.7 °C)-98.9 °F (37.2 °C)] 98.3 °F (36.8 °C)  Pulse:  [79-92] 81  Resp:  [18] 18  SpO2:  [97 %-100 %] 99 %  BP: (100-130)/(61-87) 127/82   Weight: 53.1 kg (117 lb)  Body mass index is 20.73 kg/m².  SpO2: 99 %     No intake or output data in the 24 hours ending 03/05/25 1029    Lines/Drains/Airways       Peripheral Intravenous Line  Duration                  Peripheral IV - Single Lumen 03/02/25 0144 22 G No Anterior;Left;Proximal Forearm 3 days         Peripheral IV - Single Lumen 03/03/25 1100 18 G Anterior;Right Forearm 1 day                  Significant Labs:   Chemistries:   Recent Labs   Lab 03/01/25  1434 03/01/25  1727 03/02/25  0051 03/02/25  0653 03/02/25  1238 03/03/25  0656 03/04/25  1045 03/05/25  0313     --   --  135*  --  140 139 139   K 3.9  --   --  3.3*  --  3.7 3.6 3.7     --   --  104  --  105 108* 108*   CO2 20*  --   --  21*  --  25 19* 19*   BUN 9.0*  --   --  9.8  --  10.6 10.7 8.3*   CREATININE 0.76  --   --  0.78  --  0.85 0.77 0.76   CALCIUM 9.4  --   --  8.4  --  9.2 8.9 8.8   BILITOT 1.0  --   --  0.9  --   --  0.5  --    ALKPHOS 65  --   --  77  --   --  78  --    ALT 16  --    "--  19  --   --  29  --    AST 28  --   --  25  --   --  23  --    GLUCOSE 131*  --   --  130*  --  106 97 99   MG  --   --   --  1.80  --  1.80 2.00 1.90   TROPONINI 1.387* 1.426* 1.265* 0.913* 0.917*  --   --   --         CBC/Anemia Labs: Coags:    Recent Labs   Lab 03/01/25  1433 03/02/25  0653 03/05/25  0313   WBC 15.31* 11.33 7.66   HGB 12.8 10.9* 11.8*   HCT 37.5 33.8* 36.4*    235 339   MCV 87.8 91.1 89.7   RDW 13.7 13.5 13.2    No results for input(s): "PT", "INR", "APTT" in the last 168 hours.           Telemetry:  SR    Physical Exam  Vitals and nursing note reviewed.   Constitutional:       Appearance: Normal appearance.   HENT:      Head: Normocephalic.      Mouth/Throat:      Mouth: Mucous membranes are moist.      Pharynx: Oropharynx is clear.   Cardiovascular:      Rate and Rhythm: Normal rate and regular rhythm.      Heart sounds: Normal heart sounds. No murmur heard.  Pulmonary:      Effort: Pulmonary effort is normal. No respiratory distress.      Breath sounds: Normal breath sounds. No wheezing or rales.   Abdominal:      Palpations: Abdomen is soft.      Tenderness: There is no guarding.   Musculoskeletal:      Right lower leg: No edema.      Left lower leg: No edema.   Skin:     General: Skin is warm and dry.   Neurological:      General: No focal deficit present.      Mental Status: She is alert and oriented to person, place, and time. Mental status is at baseline.   Psychiatric:         Behavior: Behavior normal.       Home Medications:   Medications Ordered Prior to Encounter[1]  Current Schedule Inpatient Medications:   atorvastatin  80 mg Oral Daily    enoxparin  1 mg/kg Subcutaneous Q12H (prophylaxis, 0900/2100)    LIDOcaine (PF) 10 mg/ml (1%)  1 mL Intradermal Once    metoprolol succinate  150 mg Oral Daily    ondansetron  4 mg Oral Once    pantoprazole  40 mg Intravenous BID    ticagrelor  90 mg Oral BID        amiodarone in dextrose 5%  0.5 mg/min Intravenous Continuous 16.7 " mL/hr at 25 0317 0.5 mg/min at 25 0317    electrolyte-A   Intravenous Continuous        electrolyte-A   Intravenous Continuous        lactated ringers   Intravenous Continuous           Assessment:   Atrial Flutter (New Onset)- Now  Sinus Rhythm     - [id number]: 4    - On FD Lovenox for Stroke Risk Reduction    - TSH Normal   NSTEMI Type IV Post Recent LAD Stenting  Recent NSTEMI Type I (Anterior Wall) (25)    - CT Angiogram Chest (25): no central pulmonary arterial filling defect to suggest pulmonary thromboembolism.  CAD    - Status Post PCI/TIEN Placement Proximal to Mid LAD 80% Lesion (25)  ICMO/Takotsubo Cardiomyopathy    - EF 35-40% with [place] is akinetic and resembles takosubo syndrome (3.2.25)  Acute Combined Sysotlic & Diastolic HF     - EF 35-40% with Grade I Diastolic Dysfunction  VHD    - MR: Moderate   Epigastric Pain of Unclear Etiology- Possible Gallbladder Etiology    - GI Team Following  Possible Pneumonia- Appears less likely/Does not appear Toxic on Exam  Anemia (Stable)  Diverticulosis Coli (CT Imaging)  Leukocytosis (Resolved)  Anxiety  No History of GI Bleed    Plan:   Transition to Oral Tapered Amiodarone this Mornin Mg PO BID x 3 Days, then 200 Mg PO BID x 3 Days, then 200 Mg PO Daily Thereafter  Continue Toprol  Mg PO Daily  Resume Eliquis 5 Mg PO BID & Continue Brilinta 90 Mg PO BID (No Aspirin to avoid triple therapy)  Patient wishes to defer GI Workup at this time  Outpatient EP Referral for Atrial Flutter Management- Dr. García  Replace K/Mag this AM   Stable from [facility] Standpoint.  Will be available. Call if needed.     Tana Phillips, ASHLEIGH  Cardiology  [facility]     I personally reviewed the NP history and physical above.  See below MDM component performed by me (Marcelino Ocampo MD):    CAD/PCI    Recent NSTEMI    TIEN to LAD on 25  Takotsubo    Apical HK on echo  PAF (flutter)    She is back in sinus now and is on Amio  Abd  pains    Recs:  Workup abd pain per primary team  Cont NOAC and Brilinta (without ASA)  Cont oral Amio load  Cont BB  Could consider outpt EP eval for possible ablation  Will sign off, she can f/u in clinic         [1]   No current facility-administered medications on file prior to encounter.     Current Outpatient Medications on File Prior to Encounter   Medication Sig Dispense Refill    aspirin (ECOTRIN) 81 MG EC tablet Take 1 tablet (81 mg total) by mouth once daily. 90 tablet 3    atorvastatin (LIPITOR) 80 MG tablet Take 1 tablet (80 mg total) by mouth once daily. 90 tablet 1    losartan (COZAAR) 25 MG tablet Take 0.5 tablets (12.5 mg total) by mouth once daily. 45 tablet 3    metoprolol succinate (TOPROL-XL) 50 MG 24 hr tablet Take 1 tablet (50 mg total) by mouth once daily. 90 tablet 3    nitroGLYCERIN (NITROSTAT) 0.4 MG SL tablet Place 1 tablet (0.4 mg total) under the tongue every 5 (five) minutes as needed for Chest pain. 10 tablet 1    pantoprazole (PROTONIX) 40 MG tablet Take 1 tablet (40 mg total) by mouth once daily. 30 tablet 0    ticagrelor (BRILINTA) 90 mg tablet Take 1 tablet (90 mg total) by mouth 2 (two) times daily. 60 tablet 11

## 2025-03-06 VITALS
OXYGEN SATURATION: 98 % | DIASTOLIC BLOOD PRESSURE: 55 MMHG | BODY MASS INDEX: 20.73 KG/M2 | TEMPERATURE: 98 F | SYSTOLIC BLOOD PRESSURE: 112 MMHG | HEIGHT: 63 IN | WEIGHT: 117 LBS | HEART RATE: 75 BPM | RESPIRATION RATE: 18 BRPM

## 2025-03-06 LAB
ANION GAP SERPL CALC-SCNC: 13 MEQ/L
BASOPHILS # BLD AUTO: 0.02 X10(3)/MCL
BASOPHILS NFR BLD AUTO: 0.3 %
BUN SERPL-MCNC: 8.4 MG/DL (ref 9.8–20.1)
CALCIUM SERPL-MCNC: 9.2 MG/DL (ref 8.4–10.2)
CHLORIDE SERPL-SCNC: 107 MMOL/L (ref 98–107)
CO2 SERPL-SCNC: 23 MMOL/L (ref 23–31)
CREAT SERPL-MCNC: 0.85 MG/DL (ref 0.55–1.02)
CREAT/UREA NIT SERPL: 10
EOSINOPHIL # BLD AUTO: 0.04 X10(3)/MCL (ref 0–0.9)
EOSINOPHIL NFR BLD AUTO: 0.5 %
ERYTHROCYTE [DISTWIDTH] IN BLOOD BY AUTOMATED COUNT: 13.2 % (ref 11.5–17)
GFR SERPLBLD CREATININE-BSD FMLA CKD-EPI: >60 ML/MIN/1.73/M2
GLUCOSE SERPL-MCNC: 95 MG/DL (ref 82–115)
HCT VFR BLD AUTO: 38.2 % (ref 37–47)
HGB BLD-MCNC: 12.6 G/DL (ref 12–16)
IMM GRANULOCYTES # BLD AUTO: 0.04 X10(3)/MCL (ref 0–0.04)
IMM GRANULOCYTES NFR BLD AUTO: 0.5 %
LYMPHOCYTES # BLD AUTO: 0.31 X10(3)/MCL (ref 0.6–4.6)
LYMPHOCYTES NFR BLD AUTO: 4.1 %
MCH RBC QN AUTO: 29.2 PG (ref 27–31)
MCHC RBC AUTO-ENTMCNC: 33 G/DL (ref 33–36)
MCV RBC AUTO: 88.6 FL (ref 80–94)
MONOCYTES # BLD AUTO: 0.68 X10(3)/MCL (ref 0.1–1.3)
MONOCYTES NFR BLD AUTO: 8.9 %
NEUTROPHILS # BLD AUTO: 6.54 X10(3)/MCL (ref 2.1–9.2)
NEUTROPHILS NFR BLD AUTO: 85.7 %
NRBC BLD AUTO-RTO: 0 %
OHS QRS DURATION: 78 MS
OHS QTC CALCULATION: 444 MS
PLATELET # BLD AUTO: 426 X10(3)/MCL (ref 130–400)
PMV BLD AUTO: 11.5 FL (ref 7.4–10.4)
POTASSIUM SERPL-SCNC: 3.7 MMOL/L (ref 3.5–5.1)
RBC # BLD AUTO: 4.31 X10(6)/MCL (ref 4.2–5.4)
SODIUM SERPL-SCNC: 143 MMOL/L (ref 136–145)
WBC # BLD AUTO: 7.63 X10(3)/MCL (ref 4.5–11.5)

## 2025-03-06 PROCEDURE — 80048 BASIC METABOLIC PNL TOTAL CA: CPT

## 2025-03-06 PROCEDURE — 36415 COLL VENOUS BLD VENIPUNCTURE: CPT

## 2025-03-06 PROCEDURE — 25000003 PHARM REV CODE 250: Performed by: INTERNAL MEDICINE

## 2025-03-06 PROCEDURE — 63600175 PHARM REV CODE 636 W HCPCS: Performed by: INTERNAL MEDICINE

## 2025-03-06 PROCEDURE — 25000003 PHARM REV CODE 250: Performed by: NURSE PRACTITIONER

## 2025-03-06 PROCEDURE — 85025 COMPLETE CBC W/AUTO DIFF WBC: CPT

## 2025-03-06 PROCEDURE — 25000003 PHARM REV CODE 250

## 2025-03-06 RX ORDER — SUCRALFATE 1 G/10ML
1 SUSPENSION ORAL 4 TIMES DAILY
Qty: 280 ML | Refills: 0 | Status: SHIPPED | OUTPATIENT
Start: 2025-03-06 | End: 2025-03-13

## 2025-03-06 RX ORDER — AMIODARONE HYDROCHLORIDE 200 MG/1
TABLET ORAL
Qty: 44 TABLET | Refills: 0 | Status: SHIPPED | OUTPATIENT
Start: 2025-03-06 | End: 2025-04-10

## 2025-03-06 RX ORDER — METOPROLOL SUCCINATE 50 MG/1
150 TABLET, EXTENDED RELEASE ORAL DAILY
Qty: 90 TABLET | Refills: 0 | Status: SHIPPED | OUTPATIENT
Start: 2025-03-07 | End: 2025-04-06

## 2025-03-06 RX ADMIN — AMIODARONE HYDROCHLORIDE 400 MG: 200 TABLET ORAL at 09:03

## 2025-03-06 RX ADMIN — LOSARTAN POTASSIUM 12.5 MG: 25 TABLET, FILM COATED ORAL at 09:03

## 2025-03-06 RX ADMIN — METOPROLOL SUCCINATE 150 MG: 50 TABLET, EXTENDED RELEASE ORAL at 09:03

## 2025-03-06 RX ADMIN — APIXABAN 5 MG: 5 TABLET, FILM COATED ORAL at 09:03

## 2025-03-06 RX ADMIN — PANTOPRAZOLE SODIUM 40 MG: 40 INJECTION, POWDER, LYOPHILIZED, FOR SOLUTION INTRAVENOUS at 09:03

## 2025-03-06 RX ADMIN — ATORVASTATIN CALCIUM 80 MG: 40 TABLET, FILM COATED ORAL at 09:03

## 2025-03-06 RX ADMIN — TICAGRELOR 90 MG: 90 TABLET ORAL at 09:03

## 2025-03-06 NOTE — DISCHARGE SUMMARY
[facility] Hospital Medicine Discharge Summary    Admit Date: 3/1/2025  Discharge Date and Time: 3/6/385608:50 AM  Admitting Physician:  Team  Discharging Physician: Shelly Berry MD.  Primary Care Physician: Deana Jaimes FNP  Consults: Cardiology and Gastroenterology    Discharge Diagnoses:  # Aflutter w/RVR   - TSH normal  # NSTEMI Type IV Post Recent LAD Stenting   # Hypokalemia - resolved  # Recent NSTEMI type I s/p PCI TIEN to LAD  # Ischemic cardiomyopathy  # Diffuse gallbladder thickening  # Colonic diverticuli  # CAD    - Status Post PCI/TIEN Placement Proximal to Mid LAD 80% Lesion (2.27.25)  # ICMO/Takotsubo Cardiomyopathy    - EF 35-40% with [place] is akinetic and resembles takosubo syndrome (3.2.25)  # Acute Combined Sysotlic & Diastolic HF     - EF 35-40% with Grade I Diastolic Dysfunction  # Anxiety     Hospital Course:   64 year old woman with a history that includes recent NSTEMI s/p PCI TIEN to LAD, with associated cardiomyopathy (EF 40-45%), discharged home on 02/28, presented back to ED on 3/1 with epigastric abd pain radiating to her back. She notes nausea but no vomiting, though has been unable/unwilling to have oral intake due to pain. LFTs and lipase were normal.     Evaluation in the ED noted pressure is borderline low, with the patient developing tachycardic up to 160s.  EKG noting sinus tachycardia.  Labs note white count of 15,300, mild NAGMA, lipase and LFTs are WNL. Troponin continues to trend down since PCI.  CTA A/P showed no evidence of mesenteric ischemia.  Patent mesenteric arterial vasculature. Small bilateral pleural effusions. Diffuse gallbladder wall thickening with small amount of high density material layering dependently.  US abdomen with similar findings. Findings may be secondary to volume overload.  If there is concern for gallbladder pathology such is cholecystitis, recommend right upper quadrant ultrasound.  However, the CT  findings are nonspecific. Colonic diverticuli.  No diverticulitis. Patient admitted to  services, Cardiology and GI consulted.  Patient also found to have Aflutter, started on oral beta-blocker.  However she subsequently developed RVR with rates >200; and was started on an amiodarone infusion which was subsequently switched to PO amiodarone.     Patient was seen by GI and she refused HIDA scan or EGD. Blood cultures negative. UA negative. Since patient refused further work up - patient cleared by GI and cardiology for discharge. Patient recommended to follow up with PCP, cardiology and EP outpatient. Patient reports that she does not want to do so many things at once and hence is wanting to follow up outpatient.  at bedside - he is in agreement of the plan. Pt was seen and examined on the day of discharge.       Vitals:  VITAL SIGNS: 24 HRS MIN & MAX LAST   Temp  Min: 97.9 °F (36.6 °C)  Max: 99.3 °F (37.4 °C) 98 °F (36.7 °C)   BP  Min: 105/64  Max: 120/76 (!) 112/55   Pulse  Min: 75  Max: 78  75   Resp  Min: 18  Max: 18 18   SpO2  Min: 97 %  Max: 99 % 98 %       Physical Exam:  GENERAL: Awake and in no disress  LUNGS: CTA anteriorly  CVS: RRR  GI/: Soft, NT, bowel sounds positive.  EXTREMITIES: No LE edema  NEURO: AAOx4, no focal deficits  PSYCH: Cooperative    Procedures Performed: No admission procedures for hospital encounter.     Significant Diagnostic Studies: See Full reports for all details    Recent Labs   Lab 03/02/25  0653 03/05/25  0313 03/06/25  0853   WBC 11.33 7.66 7.63   RBC 3.71* 4.06* 4.31   HGB 10.9* 11.8* 12.6   HCT 33.8* 36.4* 38.2   MCV 91.1 89.7 88.6   MCH 29.4 29.1 29.2   MCHC 32.2* 32.4* 33.0   RDW 13.5 13.2 13.2    339 426*   MPV 12.3* 11.8* 11.5*       Recent Labs   Lab 03/01/25  1434 03/02/25  0653 03/03/25  0656 03/04/25  1045 03/05/25  0313 03/06/25  0853    135* 140 139 139 143   K 3.9 3.3* 3.7 3.6 3.7 3.7    104 105 108* 108* 107   CO2 20* 21* 25 19* 19*  23   BUN 9.0* 9.8 10.6 10.7 8.3* 8.4*   CREATININE 0.76 0.78 0.85 0.77 0.76 0.85   CALCIUM 9.4 8.4 9.2 8.9 8.8 9.2   MG  --  1.80 1.80 2.00 1.90  --    ALBUMIN 3.5 3.1*  --  3.1*  --   --    ALKPHOS 65 77  --  78  --   --    ALT 16 19  --  29  --   --    AST 28 25  --  23  --   --    BILITOT 1.0 0.9  --  0.5  --   --         Microbiology Results (last 7 days)       Procedure Component Value Units Date/Time    Respiratory Culture [[id number]]     Order Status: Canceled Specimen: Sputum              Echo    Limited EF to assess function. EF is approximately 35-40%. The apex is   akinetic and shows evidence of takosubo. No pericardial effusion noted.  US Abdomen Limited  Narrative: EXAMINATION:  US ABDOMEN LIMITED    CLINICAL HISTORY:  Abdominal pain.RUQ/Cholecystitis;    COMPARISON:  CT yesterday.    FINDINGS:  Grayscale, color and spectral doppler evaluation of the right upper quadrant.    Pancreas is obscured by overlying bowel gas.  Imaged portions of aorta and IVC normal in caliber.    Liver is not significantly enlarged. No focal liver lesion. Normal hepatopetal flow is noted in the portal vein.    No gallstones are seen.  Mild nonspecific gallbladder wall thickening and pericholecystic fluid.  Common bile duct measures 7 mm.    Right kidney measures 10-11 cm in length. No hydronephrosis.    There is a small right pleural effusion.  Impression: 1. No gallstones are seen.  2. Mild nonspecific gallbladder wall thickening with pericholecystic fluid.  This could relate to fluid overload.  3. Mild extrahepatic biliary ductal dilatation.  Recommend correlation with cholestatic parameters.  4. Small right pleural effusion.    Electronically signed by: Lewis Tillman  Date:    03/02/2025  Time:    09:03         Medication List        START taking these medications      amiodarone 200 MG Tab  Commonly known as: PACERONE  Take 2 tablets (400 mg total) by mouth 2 (two) times daily for 2 days, THEN 1 tablet (200 mg total) 2  (two) times daily for 3 days, THEN 1 tablet (200 mg total) once daily.  Start taking on: March 6, 2025     apixaban 5 mg Tab  Commonly known as: ELIQUIS  Take 1 tablet (5 mg total) by mouth 2 (two) times daily.     sucralfate 100 mg/mL suspension  Commonly known as: CARAFATE  Take 10 mLs (1 g total) by mouth 4 (four) times daily. for 7 days            CHANGE how you take these medications      metoprolol succinate 50 MG 24 hr tablet  Commonly known as: TOPROL-XL  Take 3 tablets (150 mg total) by mouth once daily.  Start taking on: March 7, 2025  What changed: how much to take            CONTINUE taking these medications      atorvastatin 80 MG tablet  Commonly known as: LIPITOR  Take 1 tablet (80 mg total) by mouth once daily.     losartan 25 MG tablet  Commonly known as: COZAAR  Take 0.5 tablets (12.5 mg total) by mouth once daily.     nitroGLYCERIN 0.4 MG SL tablet  Commonly known as: NITROSTAT  Place 1 tablet (0.4 mg total) under the tongue every 5 (five) minutes as needed for Chest pain.     pantoprazole 40 MG tablet  Commonly known as: PROTONIX  Take 1 tablet (40 mg total) by mouth once daily.     ticagrelor 90 mg tablet  Commonly known as: BRILINTA  Take 1 tablet (90 mg total) by mouth 2 (two) times daily.            STOP taking these medications      aspirin 81 MG EC tablet  Commonly known as: ECOTRIN               Where to Get Your Medications        These medications were sent to [facility] [address] [place], [place] [address], [address] [place] [address]      Phone: [phone]   amiodarone 200 MG Tab  apixaban 5 mg Tab  metoprolol succinate 50 MG 24 hr tablet  sucralfate 100 mg/mL suspension          Explained in detail to the patient about the discharge plan, medications, and follow-up visits. Pt understands and agrees with the treatment plan  Discharge Disposition: home  Discharged Condition: stable  Diet-   Dietary Orders (From admission, onward)       Start     Ordered    03/05/25  1339  Dietary nutrition supplements BID; Boost Plus Nutritional Drink - Any flavor  Continuous        Question Answer Comment   Frequency: BID    Select PO Supplement: Boost Plus Nutritional Drink - Any flavor        03/05/25 1341    03/05/25 1337  Diet Heart Healthy  Diet effective now         03/05/25 1341                   Medications Per DC med rec  Activities as tolerated   Follow-up Information       Deana Jaimes FNP Follow up.    Specialty: Family Medicine  Contact information:  [address]  [place] [address]  [phone]               Marcelino Ocampo MD Follow up.    Specialty: Cardiology  Contact information:  [address]  [place] [address]  [phone]               Willard Salazar MD Follow up.    Specialty: Gastroenterology  Contact information:  [address]  [facility] [address]  [phone]               Vernon García MD. Call in 1 week(s).    Specialties: Cardiology, Electrophysiology  Contact information:  [address]  [facility] [address]  [phone]                           For further questions contact hospitalist office    Discharge time 75 minutes    For worsening symptoms, chest pain, shortness of breath, increased abdominal pain, high grade fever, stroke or stroke like symptoms, immediately go to the nearest Emergency Room or call 911 as soon as possible.      Shelly Cherry M.D, on 3/6/2025. at 11:50 AM.

## 2025-03-07 LAB
BACTERIA BLD CULT: NORMAL
BACTERIA BLD CULT: NORMAL

## 2025-03-11 ENCOUNTER — TELEPHONE (OUTPATIENT)
Dept: ADMINISTRATIVE | Facility: CLINIC | Age: 65
End: 2025-03-11
Payer: MEDICARE

## 2025-03-15 ENCOUNTER — HOSPITAL ENCOUNTER (EMERGENCY)
Facility: HOSPITAL | Age: 65
Discharge: HOME | End: 2025-03-15
Attending: INTERNAL MEDICINE
Payer: MEDICARE

## 2025-03-15 VITALS
TEMPERATURE: 100 F | RESPIRATION RATE: 25 BRPM | HEART RATE: 76 BPM | BODY MASS INDEX: 20.9 KG/M2 | WEIGHT: 118 LBS | OXYGEN SATURATION: 97 % | DIASTOLIC BLOOD PRESSURE: 61 MMHG | SYSTOLIC BLOOD PRESSURE: 102 MMHG

## 2025-03-15 DIAGNOSIS — R07.89 NON-CARDIAC CHEST PAIN: ICD-10-CM

## 2025-03-15 DIAGNOSIS — R07.9 CHEST PAIN: ICD-10-CM

## 2025-03-15 DIAGNOSIS — J90 PLEURAL EFFUSION: ICD-10-CM

## 2025-03-15 DIAGNOSIS — J18.9 PNEUMONIA OF BOTH LOWER LOBES DUE TO INFECTIOUS ORGANISM: Primary | ICD-10-CM

## 2025-03-15 DIAGNOSIS — R07.89 CHEST WALL PAIN: ICD-10-CM

## 2025-03-15 LAB
ALBUMIN SERPL-MCNC: 3.2 G/DL (ref 3.4–4.8)
ALBUMIN/GLOB SERPL: 0.7 RATIO (ref 1.1–2)
ALP SERPL-CCNC: 97 UNIT/L (ref 40–150)
ALT SERPL-CCNC: 16 UNIT/L (ref 0–55)
ANION GAP SERPL CALC-SCNC: 13 MEQ/L
AST SERPL-CCNC: 14 UNIT/L (ref 5–34)
BASOPHILS # BLD AUTO: 0.04 X10(3)/MCL
BASOPHILS NFR BLD AUTO: 0.2 %
BILIRUB SERPL-MCNC: 0.5 MG/DL
BNP BLD-MCNC: 184.6 PG/ML
BUN SERPL-MCNC: 12 MG/DL (ref 9.8–20.1)
CALCIUM SERPL-MCNC: 9.4 MG/DL (ref 8.4–10.2)
CHLORIDE SERPL-SCNC: 103 MMOL/L (ref 98–107)
CO2 SERPL-SCNC: 21 MMOL/L (ref 23–31)
CREAT SERPL-MCNC: 1.07 MG/DL (ref 0.55–1.02)
CREAT/UREA NIT SERPL: 11
EOSINOPHIL # BLD AUTO: 0.02 X10(3)/MCL (ref 0–0.9)
EOSINOPHIL NFR BLD AUTO: 0.1 %
ERYTHROCYTE [DISTWIDTH] IN BLOOD BY AUTOMATED COUNT: 13.4 % (ref 11.5–17)
FLUAV AG UPPER RESP QL IA.RAPID: NOT DETECTED
FLUBV AG UPPER RESP QL IA.RAPID: NOT DETECTED
GFR SERPLBLD CREATININE-BSD FMLA CKD-EPI: 57 ML/MIN/1.73/M2
GLOBULIN SER-MCNC: 4.5 GM/DL (ref 2.4–3.5)
GLUCOSE SERPL-MCNC: 131 MG/DL (ref 82–115)
HCT VFR BLD AUTO: 38.3 % (ref 37–47)
HGB BLD-MCNC: 12.6 G/DL (ref 12–16)
IMM GRANULOCYTES # BLD AUTO: 0.12 X10(3)/MCL (ref 0–0.04)
IMM GRANULOCYTES NFR BLD AUTO: 0.7 %
INR PPP: 1.5
LYMPHOCYTES # BLD AUTO: 0.63 X10(3)/MCL (ref 0.6–4.6)
LYMPHOCYTES NFR BLD AUTO: 3.6 %
MAGNESIUM SERPL-MCNC: 2 MG/DL (ref 1.6–2.6)
MCH RBC QN AUTO: 28.8 PG (ref 27–31)
MCHC RBC AUTO-ENTMCNC: 32.9 G/DL (ref 33–36)
MCV RBC AUTO: 87.6 FL (ref 80–94)
MONOCYTES # BLD AUTO: 1.6 X10(3)/MCL (ref 0.1–1.3)
MONOCYTES NFR BLD AUTO: 9.1 %
NEUTROPHILS # BLD AUTO: 15.15 X10(3)/MCL (ref 2.1–9.2)
NEUTROPHILS NFR BLD AUTO: 86.3 %
NRBC BLD AUTO-RTO: 0 %
OHS QRS DURATION: 66 MS
OHS QTC CALCULATION: 484 MS
PLATELET # BLD AUTO: 681 X10(3)/MCL (ref 130–400)
PMV BLD AUTO: 10.9 FL (ref 7.4–10.4)
POTASSIUM SERPL-SCNC: 3.9 MMOL/L (ref 3.5–5.1)
PROT SERPL-MCNC: 7.7 GM/DL (ref 5.8–7.6)
PROTHROMBIN TIME: 19.2 SECONDS (ref 12.4–14.9)
RBC # BLD AUTO: 4.37 X10(6)/MCL (ref 4.2–5.4)
RSV A 5' UTR RNA NPH QL NAA+PROBE: NOT DETECTED
SARS-COV-2 RNA RESP QL NAA+PROBE: NOT DETECTED
SODIUM SERPL-SCNC: 137 MMOL/L (ref 136–145)
TROPONIN I SERPL-MCNC: 0.02 NG/ML (ref 0–0.04)
WBC # BLD AUTO: 17.56 X10(3)/MCL (ref 4.5–11.5)

## 2025-03-15 PROCEDURE — 63600175 PHARM REV CODE 636 W HCPCS: Mod: JZ,TB | Performed by: INTERNAL MEDICINE

## 2025-03-15 PROCEDURE — 83735 ASSAY OF MAGNESIUM: CPT | Performed by: INTERNAL MEDICINE

## 2025-03-15 PROCEDURE — 96375 TX/PRO/DX INJ NEW DRUG ADDON: CPT

## 2025-03-15 PROCEDURE — 83880 ASSAY OF NATRIURETIC PEPTIDE: CPT | Performed by: INTERNAL MEDICINE

## 2025-03-15 PROCEDURE — 85025 COMPLETE CBC W/AUTO DIFF WBC: CPT | Performed by: INTERNAL MEDICINE

## 2025-03-15 PROCEDURE — 0241U COVID/RSV/FLU A&B PCR: CPT | Performed by: INTERNAL MEDICINE

## 2025-03-15 PROCEDURE — 84484 ASSAY OF TROPONIN QUANT: CPT | Performed by: INTERNAL MEDICINE

## 2025-03-15 PROCEDURE — 96374 THER/PROPH/DIAG INJ IV PUSH: CPT

## 2025-03-15 PROCEDURE — 85610 PROTHROMBIN TIME: CPT | Performed by: INTERNAL MEDICINE

## 2025-03-15 PROCEDURE — 80053 COMPREHEN METABOLIC PANEL: CPT | Performed by: INTERNAL MEDICINE

## 2025-03-15 PROCEDURE — 93005 ELECTROCARDIOGRAM TRACING: CPT

## 2025-03-15 PROCEDURE — 93010 ELECTROCARDIOGRAM REPORT: CPT | Mod: ,,, | Performed by: INTERNAL MEDICINE

## 2025-03-15 PROCEDURE — 99285 EMERGENCY DEPT VISIT HI MDM: CPT | Mod: 25

## 2025-03-15 RX ORDER — KETOROLAC TROMETHAMINE 30 MG/ML
30 INJECTION, SOLUTION INTRAMUSCULAR; INTRAVENOUS
Status: COMPLETED | OUTPATIENT
Start: 2025-03-15 | End: 2025-03-15

## 2025-03-15 RX ORDER — DEXAMETHASONE SODIUM PHOSPHATE 4 MG/ML
4 INJECTION, SOLUTION INTRA-ARTICULAR; INTRALESIONAL; INTRAMUSCULAR; INTRAVENOUS; SOFT TISSUE
Status: COMPLETED | OUTPATIENT
Start: 2025-03-15 | End: 2025-03-15

## 2025-03-15 RX ORDER — PROCHLORPERAZINE EDISYLATE 5 MG/ML
10 INJECTION INTRAMUSCULAR; INTRAVENOUS
Status: COMPLETED | OUTPATIENT
Start: 2025-03-15 | End: 2025-03-15

## 2025-03-15 RX ORDER — ALBUTEROL SULFATE 90 UG/1
1-2 INHALANT RESPIRATORY (INHALATION) EVERY 6 HOURS PRN
Qty: 8 G | Refills: 0 | Status: SHIPPED | OUTPATIENT
Start: 2025-03-15 | End: 2025-03-29

## 2025-03-15 RX ORDER — AZITHROMYCIN 500 MG/1
500 TABLET, FILM COATED ORAL DAILY
Qty: 7 TABLET | Refills: 0 | Status: SHIPPED | OUTPATIENT
Start: 2025-03-15 | End: 2025-03-22

## 2025-03-15 RX ORDER — METHYLPREDNISOLONE 4 MG/1
TABLET ORAL
Qty: 21 EACH | Refills: 0 | Status: SHIPPED | OUTPATIENT
Start: 2025-03-15 | End: 2025-04-05

## 2025-03-15 RX ORDER — CEFTRIAXONE 1 G/1
1 INJECTION, POWDER, FOR SOLUTION INTRAMUSCULAR; INTRAVENOUS
Status: COMPLETED | OUTPATIENT
Start: 2025-03-15 | End: 2025-03-15

## 2025-03-15 RX ADMIN — KETOROLAC TROMETHAMINE 30 MG: 30 INJECTION, SOLUTION INTRAMUSCULAR; INTRAVENOUS at 02:03

## 2025-03-15 RX ADMIN — DEXAMETHASONE SODIUM PHOSPHATE 4 MG: 4 INJECTION, SOLUTION INTRA-ARTICULAR; INTRALESIONAL; INTRAMUSCULAR; INTRAVENOUS; SOFT TISSUE at 03:03

## 2025-03-15 RX ADMIN — CEFTRIAXONE SODIUM 1 G: 1 INJECTION, POWDER, FOR SOLUTION INTRAMUSCULAR; INTRAVENOUS at 03:03

## 2025-03-15 RX ADMIN — PROCHLORPERAZINE EDISYLATE 10 MG: 5 INJECTION, SOLUTION INTRAMUSCULAR; INTRAVENOUS at 02:03

## 2025-03-15 NOTE — ED PROVIDER NOTES
Encounter Date: 3/15/2025       History     Chief Complaint   Patient presents with    Chest Pain     Chest pain starting last night, increasing tonight. Worse when taking a deep breath. Feels like an elephant is sitting on her chest     Very anxious 64-year-old white female presents emergency department complaining of chest pain that started last night.  She reports it is in the lower right rib area in the lower left rib area and going up through her chest and around to her back.  She reports she was trying to sleep yesterday and had trouble sleeping because of the intermittent pain and tonight she could not sleep at all so she came to the ER.  She does admit that she had a stent placed month ago      Review of patient's allergies indicates:   Allergen Reactions    Meperidine Shortness Of Breath    Sulfa (sulfonamide antibiotics) Shortness Of Breath     Past Medical History:   Diagnosis Date    Known health problems: none      Past Surgical History:   Procedure Laterality Date     SECTION       SECTION      LEFT HEART CATHETERIZATION N/A 2025    Procedure: Left heart cath;  Surgeon: Inna Turner MD;  Location: [facility] CATH LAB;  Service: Cardiology;  Laterality: N/A;    WRIST SURGERY       No family history on file.  Social History[1]  Review of Systems   Constitutional: Negative.  Negative for activity change, appetite change, chills, diaphoresis, fatigue, fever and unexpected weight change.   HENT: Negative.  Negative for congestion, dental problem, drooling, ear discharge, ear pain, facial swelling, hearing loss, mouth sores, nosebleeds, postnasal drip, rhinorrhea, sinus pressure, sinus pain, sneezing, sore throat, tinnitus, trouble swallowing and voice change.    Eyes: Negative.  Negative for photophobia, pain, discharge, redness, itching and visual disturbance.   Respiratory: Negative.  Negative for apnea, cough, choking, chest tightness, shortness of breath, wheezing and stridor.     Cardiovascular:  Positive for chest pain. Negative for palpitations and leg swelling.   Gastrointestinal: Negative.  Negative for abdominal distention, abdominal pain, anal bleeding, blood in stool, constipation, diarrhea, nausea, rectal pain and vomiting.   Endocrine: Negative.  Negative for cold intolerance, heat intolerance, polydipsia, polyphagia and polyuria.   Genitourinary: Negative.  Negative for decreased urine volume, difficulty urinating, dyspareunia, dysuria, enuresis, flank pain, frequency, genital sores, hematuria, menstrual problem, pelvic pain, urgency, vaginal bleeding, vaginal discharge and vaginal pain.   Musculoskeletal: Negative.  Negative for arthralgias, back pain, gait problem, joint swelling, myalgias, neck pain and neck stiffness.   Skin: Negative.  Negative for color change, pallor, rash and wound.   Allergic/Immunologic: Negative.  Negative for environmental allergies, food allergies and immunocompromised state.   Neurological: Negative.  Negative for dizziness, tremors, seizures, syncope, facial asymmetry, speech difficulty, weakness, light-headedness, numbness and headaches.   Hematological: Negative.  Negative for adenopathy. Does not bruise/bleed easily.   Psychiatric/Behavioral: Negative.  Negative for agitation, behavioral problems, confusion, decreased concentration, dysphoric mood, hallucinations, self-injury, sleep disturbance and suicidal ideas. The patient is not nervous/anxious and is not hyperactive.    All other systems reviewed and are negative.      Physical Exam     Initial Vitals [03/15/25 0157]   BP Pulse Resp Temp SpO2   (!) 141/77 84 16 99.5 °F (37.5 °C) (!) 94 %      MAP       --         Physical Exam    Nursing note and vitals reviewed.  Constitutional: She appears well-developed and well-nourished. She is not diaphoretic. No distress.   HENT:   Head: Normocephalic and atraumatic. Mouth/Throat: Oropharynx is clear and moist. No oropharyngeal exudate.   Eyes:  Conjunctivae and EOM are normal. Pupils are equal, round, and reactive to light. No scleral icterus.   Neck: Neck supple. No JVD present.   Normal range of motion.  Cardiovascular:  Normal rate, regular rhythm, normal heart sounds and intact distal pulses.     Exam reveals no gallop and no friction rub.       No murmur heard.  Pulmonary/Chest: No respiratory distress. She has no wheezes. She has rhonchi. She exhibits tenderness.   Palpation of her chest reproduces her symptoms   Abdominal: Abdomen is soft. Bowel sounds are normal. She exhibits no distension. There is no abdominal tenderness. There is no rebound.   Musculoskeletal:         General: Normal range of motion.      Cervical back: Normal range of motion and neck supple.     Neurological: She is alert and oriented to person, place, and time. She has normal strength.   Skin: Skin is warm and dry. Capillary refill takes less than 2 seconds.   Psychiatric: Thought content normal. Her mood appears anxious.         ED Course   Procedures  Labs Reviewed   COMPREHENSIVE METABOLIC PANEL - Abnormal       Result Value    Sodium 137      Potassium 3.9      Chloride 103      CO2 21 (*)     Glucose 131 (*)     Blood Urea Nitrogen 12.0      Creatinine 1.07 (*)     Calcium 9.4      Protein Total 7.7 (*)     Albumin 3.2 (*)     Globulin 4.5 (*)     Albumin/Globulin Ratio 0.7 (*)     Bilirubin Total 0.5      ALP 97      ALT 16      AST 14      eGFR 57      Anion Gap 13.0      BUN/Creatinine Ratio 11     B-TYPE NATRIURETIC PEPTIDE - Abnormal    Natriuretic Peptide 184.6 (*)    PROTIME-INR - Abnormal    PT 19.2 (*)     INR 1.5 (*)     Narrative:     Protimes are used to monitor anticoagulant agents such as warfarin. PT INR values are based on the current patient normal mean and the JADON value for the specific instrument reagent used.  **Routine theraputic target values for the INR are 2.0-3.0**   CBC WITH DIFFERENTIAL - Abnormal    WBC 17.56 (*)     RBC 4.37      Hgb 12.6       Hct 38.3      MCV 87.6      MCH 28.8      MCHC 32.9 (*)     RDW 13.4      Platelet 681 (*)     MPV 10.9 (*)     Neut % 86.3      Lymph % 3.6      Mono % 9.1      Eos % 0.1      Basophil % 0.2      Imm Grans % 0.7      Neut # 15.15 (*)     Lymph # 0.63      Mono # 1.60 (*)     Eos # 0.02      Baso # 0.04      Imm Gran # 0.12 (*)     NRBC% 0.0     TROPONIN I - Normal    Troponin-I 0.018     MAGNESIUM - Normal    Magnesium Level 2.00     COVID/RSV/FLU A&B PCR - Normal    Influenza A PCR Not Detected      Influenza B PCR Not Detected      Respiratory Syncytial Virus PCR Not Detected      SARS-CoV-2 PCR Not Detected      Narrative:     The Xpert Xpress SARS-CoV-2/FLU/RSV plus is a rapid, multiplexed real-time PCR test intended for the simultaneous qualitative detection and differentiation of SARS-CoV-2, Influenza A, Influenza B, and respiratory syncytial virus (RSV) viral RNA in either nasopharyngeal swab or nasal swab specimens.         CBC W/ AUTO DIFFERENTIAL    Narrative:     The following orders were created for panel order CBC auto differential.  Procedure                               Abnormality         Status                     ---------                               -----------         ------                     CBC with Differential[[id number]]       Abnormal            Final result                 Please view results for these tests on the individual orders.     EKG Readings: (Independently Interpreted)   EKG done at 1:48 a.m. on March 15, 2025 shows normal sinus rhythm nonspecific T-wave abnormalities in the inferior and anterior lateral leads prolonged QT ventricular rate is 84 beats per       Imaging Results              X-Ray Chest AP Portable (Preliminary result)  Result time 03/15/25 03:47:33      Wet Read by Julio Zavala MD (03/15/25 03:47:33, [facility] - Emergency Dept, Emergency Medicine)    Blunting of the costophrenic angles bilaterally with early development of infiltrate in  the right lower lobe consistent with clinical examination                                     Medications   ketorolac injection 30 mg (30 mg Intravenous Given 3/15/25 0200)   prochlorperazine injection Soln 10 mg (10 mg Intravenous Given 3/15/25 0200)   cefTRIAXone injection 1 g (1 g Intravenous Given 3/15/25 0344)   dexAMETHasone injection 4 mg (4 mg Intravenous Given 3/15/25 0344)     Medical Decision Making  64-year-old white female with pleuritic type chest pain.  Differential diagnosis includes but is not limited to STEMI, NSTEMI, pericarditis, congestive heart failure, pneumothorax, pleural effusion, pneumonia, pleurisy, costochondritis, anxiety, GERD.  Workup included EKG which showed no evidence of ischemia infarction blood work which showed a marked elevation in her white blood cell count with a left shift neutrophils and chest x-ray which showed bilateral small pleural effusions with a early infiltrate forming in the right lower lung area swabs were sent and she had no evidence of viral respiratory illnesses so she was given a g of Rocephin and some dexamethasone.  For her chest wall pain she was given Toradol and Compazine and this greatly reduced her anxiety and improved her pain.  Will discharge with Medrol Dosepak azithromycin and an inhaler    Problems Addressed:  Chest pain: acute illness or injury with systemic symptoms  Chest wall pain: acute illness or injury with systemic symptoms  Non-cardiac chest pain: acute illness or injury  Pleural effusion: acute illness or injury  Pneumonia of both lower lobes due to infectious organism: acute illness or injury with systemic symptoms    Amount and/or Complexity of Data Reviewed  Independent Historian: spouse  External Data Reviewed: labs, radiology, ECG and notes.  Labs: ordered. Decision-making details documented in ED Course.  Radiology: ordered and independent interpretation performed. Decision-making details documented in ED Course.  ECG/medicine  "tests: ordered and independent interpretation performed. Decision-making details documented in ED Course.    Risk  OTC drugs.  Prescription drug management.                                      Clinical Impression:  Final diagnoses:  [R07.9] Chest pain  [J18.9] Pneumonia of both lower lobes due to infectious organism (Primary)  [J90] Pleural effusion  [R07.89] Non-cardiac chest pain  [R07.89] Chest wall pain          ED Disposition Condition    Discharge Stable          ED Prescriptions       Medication Sig Dispense Start Date End Date Auth. Provider    methylPREDNISolone (MEDROL DOSEPACK) 4 mg tablet use as directed 21 each 3/15/2025 4/5/2025 Julio Zavala MD    azithromycin (ZITHROMAX) 500 MG tablet Take 1 tablet (500 mg total) by mouth once daily. for 7 days 7 tablet 3/15/2025 3/22/2025 Julio Zavala MD    albuterol (PROVENTIL/VENTOLIN HFA) 90 mcg/actuation inhaler Inhale 1-2 puffs into the lungs every 6 (six) hours as needed for Wheezing or Shortness of Breath. Rescue 8 g 3/15/2025 3/29/2025 Julio Zavala MD          Follow-up Information       Follow up With Specialties Details Why Contact Info    Deana Jaimes[facility] In 3 days  [address] [place] [id number] [phone]            Portions of this note have been created with voice recognition software. Occasional "wrong-words" or "sound alike" substitutions may have occurred due to inherent limitations of voice software. Please read the note carefully and recognize, using context, word substitutions may have occurred.         [1]   Social History  Tobacco Use    Smoking status: Never    Smokeless tobacco: Never   Substance Use Topics    Alcohol use: Yes     Comment: occ    Drug use: Not Currently        Julio Zavala MD  03/15/25 0351    "

## 2025-04-04 ENCOUNTER — HOSPITAL ENCOUNTER (OUTPATIENT)
Dept: RADIOLOGY | Facility: HOSPITAL | Age: 65
Discharge: HOME | End: 2025-04-04
Attending: FAMILY MEDICINE
Payer: MEDICARE

## 2025-04-04 DIAGNOSIS — R07.2 PRECORDIAL PAIN: ICD-10-CM

## 2025-04-04 PROCEDURE — 71046 X-RAY EXAM CHEST 2 VIEWS: CPT | Mod: TC

## 2025-04-08 ENCOUNTER — HOSPITAL ENCOUNTER (EMERGENCY)
Facility: HOSPITAL | Age: 65
Discharge: HOME | End: 2025-04-08
Attending: EMERGENCY MEDICINE
Payer: MEDICARE

## 2025-04-08 VITALS
WEIGHT: 112 LBS | SYSTOLIC BLOOD PRESSURE: 108 MMHG | TEMPERATURE: 99 F | HEIGHT: 63 IN | HEART RATE: 50 BPM | OXYGEN SATURATION: 98 % | RESPIRATION RATE: 20 BRPM | DIASTOLIC BLOOD PRESSURE: 60 MMHG | BODY MASS INDEX: 19.84 KG/M2

## 2025-04-08 DIAGNOSIS — Z86.79 HISTORY OF HYPERTENSION: ICD-10-CM

## 2025-04-08 DIAGNOSIS — R79.89 ELEVATED TROPONIN: Primary | ICD-10-CM

## 2025-04-08 DIAGNOSIS — R07.9 CHEST PAIN: ICD-10-CM

## 2025-04-08 LAB
ALBUMIN SERPL-MCNC: 4.2 G/DL (ref 3.4–4.8)
ALBUMIN/GLOB SERPL: 0.9 RATIO (ref 1.1–2)
ALP SERPL-CCNC: 84 UNIT/L (ref 40–150)
ALT SERPL-CCNC: 16 UNIT/L (ref 0–55)
ANION GAP SERPL CALC-SCNC: 12 MEQ/L
AST SERPL-CCNC: 13 UNIT/L (ref 11–45)
BASOPHILS # BLD AUTO: 0.04 X10(3)/MCL
BASOPHILS NFR BLD AUTO: 0.6 %
BILIRUB SERPL-MCNC: 0.5 MG/DL
BNP BLD-MCNC: 233.9 PG/ML
BUN SERPL-MCNC: 20 MG/DL (ref 9.8–20.1)
CALCIUM SERPL-MCNC: 10.4 MG/DL (ref 8.4–10.2)
CHLORIDE SERPL-SCNC: 104 MMOL/L (ref 98–107)
CO2 SERPL-SCNC: 27 MMOL/L (ref 23–31)
CREAT SERPL-MCNC: 1.01 MG/DL (ref 0.55–1.02)
CREAT/UREA NIT SERPL: 20
EOSINOPHIL # BLD AUTO: 0.02 X10(3)/MCL (ref 0–0.9)
EOSINOPHIL NFR BLD AUTO: 0.3 %
ERYTHROCYTE [DISTWIDTH] IN BLOOD BY AUTOMATED COUNT: 15.4 % (ref 11.5–17)
GFR SERPLBLD CREATININE-BSD FMLA CKD-EPI: >60 ML/MIN/1.73/M2
GLOBULIN SER-MCNC: 4.5 GM/DL (ref 2.4–3.5)
GLUCOSE SERPL-MCNC: 89 MG/DL (ref 82–115)
HCT VFR BLD AUTO: 44.7 % (ref 37–47)
HGB BLD-MCNC: 14.3 G/DL (ref 12–16)
IMM GRANULOCYTES # BLD AUTO: 0.08 X10(3)/MCL (ref 0–0.04)
IMM GRANULOCYTES NFR BLD AUTO: 1.1 %
LYMPHOCYTES # BLD AUTO: 1.15 X10(3)/MCL (ref 0.6–4.6)
LYMPHOCYTES NFR BLD AUTO: 16 %
MAGNESIUM SERPL-MCNC: 1.9 MG/DL (ref 1.6–2.6)
MCH RBC QN AUTO: 28.3 PG (ref 27–31)
MCHC RBC AUTO-ENTMCNC: 32 G/DL (ref 33–36)
MCV RBC AUTO: 88.5 FL (ref 80–94)
MONOCYTES # BLD AUTO: 0.53 X10(3)/MCL (ref 0.1–1.3)
MONOCYTES NFR BLD AUTO: 7.4 %
NEUTROPHILS # BLD AUTO: 5.39 X10(3)/MCL (ref 2.1–9.2)
NEUTROPHILS NFR BLD AUTO: 74.6 %
NRBC BLD AUTO-RTO: 0 %
PLATELET # BLD AUTO: 446 X10(3)/MCL (ref 130–400)
PMV BLD AUTO: 11.1 FL (ref 7.4–10.4)
POTASSIUM SERPL-SCNC: 3.2 MMOL/L (ref 3.5–5.1)
PROT SERPL-MCNC: 8.7 GM/DL (ref 5.8–7.6)
RBC # BLD AUTO: 5.05 X10(6)/MCL (ref 4.2–5.4)
SODIUM SERPL-SCNC: 143 MMOL/L (ref 136–145)
TROPONIN I SERPL-MCNC: 0.03 NG/ML (ref 0–0.04)
TROPONIN I SERPL-MCNC: 0.1 NG/ML (ref 0–0.04)
TROPONIN I SERPL-MCNC: 0.13 NG/ML (ref 0–0.04)
WBC # BLD AUTO: 7.21 X10(3)/MCL (ref 4.5–11.5)

## 2025-04-08 PROCEDURE — 83880 ASSAY OF NATRIURETIC PEPTIDE: CPT | Performed by: EMERGENCY MEDICINE

## 2025-04-08 PROCEDURE — 85025 COMPLETE CBC W/AUTO DIFF WBC: CPT | Performed by: EMERGENCY MEDICINE

## 2025-04-08 PROCEDURE — 84484 ASSAY OF TROPONIN QUANT: CPT | Performed by: EMERGENCY MEDICINE

## 2025-04-08 PROCEDURE — 93010 ELECTROCARDIOGRAM REPORT: CPT | Mod: ,,, | Performed by: INTERNAL MEDICINE

## 2025-04-08 PROCEDURE — 25000003 PHARM REV CODE 250

## 2025-04-08 PROCEDURE — 80053 COMPREHEN METABOLIC PANEL: CPT | Performed by: EMERGENCY MEDICINE

## 2025-04-08 PROCEDURE — 25000003 PHARM REV CODE 250: Performed by: EMERGENCY MEDICINE

## 2025-04-08 PROCEDURE — 84484 ASSAY OF TROPONIN QUANT: CPT

## 2025-04-08 PROCEDURE — 99285 EMERGENCY DEPT VISIT HI MDM: CPT | Mod: 25

## 2025-04-08 PROCEDURE — 83735 ASSAY OF MAGNESIUM: CPT | Performed by: EMERGENCY MEDICINE

## 2025-04-08 PROCEDURE — 93005 ELECTROCARDIOGRAM TRACING: CPT

## 2025-04-08 RX ORDER — CLONIDINE HYDROCHLORIDE 0.1 MG/1
0.1 TABLET ORAL
Status: COMPLETED | OUTPATIENT
Start: 2025-04-08 | End: 2025-04-08

## 2025-04-08 RX ADMIN — CLONIDINE HYDROCHLORIDE 0.1 MG: 0.1 TABLET ORAL at 05:04

## 2025-04-08 RX ADMIN — POTASSIUM BICARBONATE 20 MEQ: 782 TABLET, EFFERVESCENT ORAL at 02:04

## 2025-04-08 NOTE — ED PROVIDER NOTES
Encounter Date: 2025       History     Chief Complaint   Patient presents with    Hypertension    Chest Pain     C/o some chest discomfort and high blood pressure this am     See MDM    The history is provided by the patient. No  was used.     Review of patient's allergies indicates:   Allergen Reactions    Meperidine Shortness Of Breath    Sulfa (sulfonamide antibiotics) Shortness Of Breath     Past Medical History:   Diagnosis Date    Known health problems: none      Past Surgical History:   Procedure Laterality Date     SECTION       SECTION      LEFT HEART CATHETERIZATION N/A 2025    Procedure: Left heart cath;  Surgeon: Inna Turner MD;  Location: [facility] CATH LAB;  Service: Cardiology;  Laterality: N/A;    WRIST SURGERY       No family history on file.  Social History[1]  Review of Systems   Constitutional:         Chest pain headache HTN   All other systems reviewed and are negative.      Physical Exam     Initial Vitals [25 1214]   BP Pulse Resp Temp SpO2   (!) 187/98 70 16 98.4 °F (36.9 °C) 99 %      MAP       --         Physical Exam    Nursing note and vitals reviewed.  Constitutional: She appears well-developed and well-nourished. She is not diaphoretic. She is cooperative.  Non-toxic appearance. She does not have a sickly appearance. She does not appear ill. No distress.   Patient appears to be a tad bit nervous but is easily redirected.  She is calm and cooperative and answers all questions asked.   HENT:   Head: Normocephalic and atraumatic.   Eyes: EOM are normal.   Neck:   Normal range of motion.  Cardiovascular:  Normal rate, regular rhythm and intact distal pulses.           Pulmonary/Chest: Breath sounds normal. No respiratory distress.   Abdominal: Abdomen is soft. Bowel sounds are normal.   Musculoskeletal:         General: Normal range of motion.      Cervical back: Normal range of motion.     Neurological: She is alert and oriented to  person, place, and time. GCS score is 15. GCS eye subscore is 4. GCS verbal subscore is 5. GCS motor subscore is 6.   Skin: Skin is warm and dry.         ED Course   Procedures  Labs Reviewed   COMPREHENSIVE METABOLIC PANEL - Abnormal       Result Value    Sodium 143      Potassium 3.2 (*)     Chloride 104      CO2 27      Glucose 89      Blood Urea Nitrogen 20.0      Creatinine 1.01      Calcium 10.4 (*)     Protein Total 8.7 (*)     Albumin 4.2      Globulin 4.5 (*)     Albumin/Globulin Ratio 0.9 (*)     Bilirubin Total 0.5      ALP 84      ALT 16      AST 13      eGFR >60      Anion Gap 12.0      BUN/Creatinine Ratio 20     B-TYPE NATRIURETIC PEPTIDE - Abnormal    Natriuretic Peptide 233.9 (*)    CBC WITH DIFFERENTIAL - Abnormal    WBC 7.21      RBC 5.05      Hgb 14.3      Hct 44.7      MCV 88.5      MCH 28.3      MCHC 32.0 (*)     RDW 15.4      Platelet 446 (*)     MPV 11.1 (*)     Neut % 74.6      Lymph % 16.0      Mono % 7.4      Eos % 0.3      Basophil % 0.6      Imm Grans % 1.1      Neut # 5.39      Lymph # 1.15      Mono # 0.53      Eos # 0.02      Baso # 0.04      Imm Gran # 0.08 (*)     NRBC% 0.0     TROPONIN I - Abnormal    Troponin-I 0.134 (*)    TROPONIN I - Abnormal    Troponin-I 0.103 (*)    TROPONIN I - Normal    Troponin-I 0.026     MAGNESIUM - Normal    Magnesium Level 1.90     CBC W/ AUTO DIFFERENTIAL    Narrative:     The following orders were created for panel order CBC auto differential.  Procedure                               Abnormality         Status                     ---------                               -----------         ------                     CBC with Differential[[id number]]       Abnormal            Final result                 Please view results for these tests on the individual orders.     EKG Readings: (Independently Interpreted)   Initial Reading: No STEMI. Previous EKG: Compared with most recent EKG Previous EKG Date: 3/15/25. Rhythm: Sinus Bradycardia. Heart Rate: 54.  ST Segment Depression: I, II, III, V2, V3, V4, V5 and V6. Axis: Normal. Clinical Impression: Sinus Bradycardia     ECG Results              EKG 12-lead (In process)        Collection Time Result Time QRS Duration OHS QTC Calculation    04/08/25 12:26:39 04/08/25 13:12:23 78 430                     In process by Interface, Lab In [facility] (04/08/25 13:12:28)                   Narrative:    Test Reason : R07.9,    Vent. Rate :  54 BPM     Atrial Rate :  54 BPM     P-R Int : 136 ms          QRS Dur :  78 ms      QT Int : 454 ms       P-R-T Axes :  70  76 264 degrees    QTcB Int : 430 ms    Sinus bradycardia  ST and T wave abnormality, consider inferior ischemia  ST and T wave abnormality, consider anterolateral ischemia  Abnormal ECG  When compared with ECG of 15-Mar-2025 01:48,  Vent. rate has decreased by  30 bpm  T wave inversion less evident in Anterior leads  QT has shortened    Referred By: AAAREFERRAL SELF           Confirmed By:                                   Imaging Results              X-Ray Chest AP Portable (Final result)  Result time 04/08/25 14:58:55      Final result by Matt San MD (04/08/25 14:58:55)                   Impression:      1. Borderline cardiomegaly  2. Atelectasis and/or scarring lower lungs  3. Thoracic spondylosis and scoliosis      Electronically signed by: Matt San  Date:    04/08/2025  Time:    14:58               Narrative:    EXAMINATION:  XR CHEST AP PORTABLE    CLINICAL HISTORY:  chest pain;, .    COMPARISON:  04/04/2025    FINDINGS:  An AP view or more reveals the heart to be borderline enlarged.  The trachea is midline.  No definite infiltrate or effusion is seen.  Atelectasis and/or scarring is evident the left lung base.  Degenerative changes and curvature are noted to the thoracic spine.  Bony structures are osteopenic.                                       Medications   potassium bicarbonate disintegrating tablet 20 mEq (20 mEq Oral Given 4/8/25 1400)  "  cloNIDine tablet 0.1 mg (0.1 mg Oral Given 4/8/25 4816)     Medical Decision Making  The patient is a 64 y.o. female with a pertinent PMHX of MI this past February, a flutter, blood thinner use who presents to the Emergency Department with her  with a chief complaint of HTN. Symptoms began this morning when patient states that she was going to get a "vitamin infusion" and had her blood pressure checked at the clinic and was told that her blood pressure was greater than 200 / greater than 100. Associated symptoms include intermittent chest pain and headache that began after the patient saw her blood pressure reading which has since resolved.  Patient states that she believes the headache and chest pain was due to her being anxious about the blood pressure reading.  The chest pain is currently rated as a 0/10 in severity and described as painful with no radiation.  Symptoms are aggravated with nothing and alleviated with nothing. The patient denies weakness, dizziness, nausea, vomiting, shortness of breath, abdominal pain, fever, cough, changes in vision, changes in speech. No other reported symptoms at this time.  Patient states that she took all of her daily medications today as directed.  Cardiologist is Dr Ocampo.    Pertinent physical exam findings include patient appearing to be a bit anxious but is easily redirected.  RRR, clear breath sounds, equal pulses.  Vital signs on arrival notable for HTN, otherwise stable.  Patient states that she took all of her medications as directed today.    EKG sinus Jeff.  CMP showing potassium 3.2, otherwise no emergent findings.  Troponin #1 0.026.  Mg WNL.  CBC with no signs of acute infection.  .9.    CXR impression:1. Borderline cardiomegaly   2. Atelectasis and/or scarring lower lungs   3. Thoracic spondylosis and scoliosis.      Troponin#2:  0.134.  Troponin#3:  0.103    Laboratory and imaging findings discussed with patient.  Patient will be discharged with " "close cardiology follow up as recommended per cardiology consult.  Patient will call her cardiologist Dr. Ocampo in the morning to schedule an appointment.  Discharge instructions and strict return precautions provided. Patient verbalized understanding and agreement of plan. All questions answered.    Portions of this note have been created with voice recognition software. Occasional "wrong-words" or "sound alike" substitutions may have occurred due to inherent limitations of voice software. Please read the note carefully and recognize, using context, word substitutions may have occurred.       Amount and/or Complexity of Data Reviewed  External Data Reviewed: labs.     Details: Today's labs compared to previous.    Previous BNP 4 days ago 97.3  CXR on 03/15/2025 reading:  The cardiac silhouette and central vascular structures are unchanged.  The trachea is midline. No new or worsening consolidation is developed in the interval.  Blunting of the costophrenic angles is present, consistent with newly developed small pleural effusions.  Regional osseous structures and extrathoracic soft tissues are similar.    Left heart catheterization with Dr. Turner on 02/27/2025 02/28/2025 note states that angiogram showed:   Proximal to mid LAD with 80 % noncalcified stenosis treated with IVUS TIEN using a 3.5 x 34 marcela stent post dilated with a 4.0 x 6 NC.  The moderate-sized diagonal was placed in stent assisted with KAYE 3 flow at the completion of the procedure.  ·  Remaining coronaries demonstrated patent anatomy  ·  The ejection fraction was 40% with apical ballooning suggestive of takotsubo.  The EDP was 17 mmHg.    Labs: ordered. Decision-making details documented in ED Course.  Radiology: ordered. Decision-making details documented in ED Course.  ECG/medicine tests: ordered and independent interpretation performed. Decision-making details documented in ED Course.  Discussion of management or test interpretation with external " provider(s): Spoke with Dr.John Mcneil with cardiology who requested troponin be repeated 2 hours after the 2nd troponin was drawn and if it has elevated further, patient will need to be transferred to [facility] for further care.    Risk  Prescription drug management.  Risk Details: Strict ED return precautions provided. I have spoken with the patient and/or caregivers. I have explained the patient's condition, diagnoses and treatment plan based on the information available to me at this time. I have answered the patient's and/or caregiver's questions and addressed any concerns. The patient and/or caregivers have as good an understanding of the patient's diagnosis, condition and treatment plan as can be expected at this point. The patient's condition is stable and appropriate for discharge from the emergency department.      The patient will pursue further outpatient evaluation with the primary care physician or other designated or consulting physician as outlined in the discharge instructions. The patient and/or caregivers are agreeable to this plan of care and follow-up instructions have been explained in detail. The patient and/or caregivers have received these instructions in written format and have expressed an understanding of the discharge instructions. The patient and/or caregivers are aware that any significant change in condition or worsening of symptoms should prompt an immediate return to this or the closest emergency department or a call to 911.        Additional MDM:   Differential Diagnosis:   Judging by the patient's chief complaint and pertinent history, the patient has the following possible differential diagnoses, including but not limited to the following:  ACS, medication noncompliant, resistant hypertension, anxiety  Some of these are deemed to be lower likelihood and some more likely based on my physical exam and history combined with possible lab work and/or imaging studies. Please  see the pertinent studies, and refer to the HPI. Some of these diagnoses will take further evaluation to fully rule out, perhaps as an outpatient and the patient was encouraged to follow up when discharged for more comprehensive evaluation.          APC / Resident Notes:   I have personally had face to face with the patient.  All charts, labs, and imaging studies were reviewed.  I agree with this PA/NPs findings, exam and plan. I approve the management plan and take responsibility of the patient management.                                   Clinical Impression:  Final diagnoses:  [R07.9] Chest pain  [R79.89] Elevated troponin (Primary)  [Z86.79] History of hypertension          ED Disposition Condition    Discharge Stable          ED Prescriptions    None       Follow-up Information       Follow up With Specialties Details Why Contact Info    Deana Jaimes FNP Family Medicine Call in 1 week As needed [address]  [place]  [place] [id number] [phone]      Marcelino Ocampo MD Cardiology Call in 1 week As needed [address] [place] [id number] [phone]                   [1]   Social History  Tobacco Use    Smoking status: Never    Smokeless tobacco: Never   Substance Use Topics    Alcohol use: Yes     Comment: occ    Drug use: Not Currently        Beatriz Chavez PA-C  04/08/25 1932

## 2025-04-08 NOTE — TELEMEDICINE CONSULT
[facility] - Emergency Dept    Telecardiology  Consult Note    Patient Name: Hien Roy  MRN: [id number]  Admission Date: 2025  Hospital Length of Stay: 0 days  Code Status: Prior   Attending Provider: Chava Odonnell DO   Consulting Provider: Prabhu Mcneil Jr, MD  Primary Care Physician: Deana Jaimes FNP  Principal Problem:<principal problem not specified>    Patient information was obtained from ER records.     Subjective:     Chief Complaint/Reason for Consult: HTN, chest pain    HPI: 64 year old woman with history of CAD status post recent LAD stent presents with elevated BP and chest pain. Patient was getting a vitamin infusion in outpatient clinic. BP was checked, and found to be 200/100. This caused patient to become anxious and she began to experience chest pain. BP gradually came down, and patient's chest pain subsided. She presented to ED. Patient is compliant with all medications, including DAPT. Chest pain free at this time.      Past Medical History:   Diagnosis Date    Known health problems: none      Past Surgical History:   Procedure Laterality Date     SECTION       SECTION      LEFT HEART CATHETERIZATION N/A 2025    Procedure: Left heart cath;  Surgeon: Inna Turner MD;  Location: [facility] CATH LAB;  Service: Cardiology;  Laterality: N/A;    WRIST SURGERY       Review of patient's allergies indicates:   Allergen Reactions    Meperidine Shortness Of Breath    Sulfa (sulfonamide antibiotics) Shortness Of Breath     No current facility-administered medications on file prior to encounter.     Current Outpatient Medications on File Prior to Encounter   Medication Sig    albuterol (PROVENTIL/VENTOLIN HFA) 90 mcg/actuation inhaler Inhale 1-2 puffs into the lungs every 6 (six) hours as needed for Wheezing or Shortness of Breath. Rescue    amiodarone (PACERONE) 200 MG Tab Take 2 tablets (400 mg total) by mouth 2 (two) times daily for 2 days, THEN 1 tablet  (200 mg total) 2 (two) times daily for 3 days, THEN 1 tablet (200 mg total) once daily.    atorvastatin (LIPITOR) 80 MG tablet Take 1 tablet (80 mg total) by mouth once daily.    losartan (COZAAR) 25 MG tablet Take 0.5 tablets (12.5 mg total) by mouth once daily.    metoprolol succinate (TOPROL-XL) 50 MG 24 hr tablet Take 3 tablets (150 mg total) by mouth once daily.    nitroGLYCERIN (NITROSTAT) 0.4 MG SL tablet Place 1 tablet (0.4 mg total) under the tongue every 5 (five) minutes as needed for Chest pain.    pantoprazole (PROTONIX) 40 MG tablet Take 1 tablet (40 mg total) by mouth once daily.    ticagrelor (BRILINTA) 90 mg tablet Take 1 tablet (90 mg total) by mouth 2 (two) times daily.     Family History    None       Tobacco Use    Smoking status: Never    Smokeless tobacco: Never   Substance and Sexual Activity    Alcohol use: Yes     Comment: occ    Drug use: Not Currently    Sexual activity: Not on file       Review of Systems   Constitutional: Negative.    HENT: Negative.     Eyes: Negative.    Respiratory: Negative.     Cardiovascular:  Positive for chest pain.   Gastrointestinal: Negative.    Endocrine: Negative.    Genitourinary: Negative.    Musculoskeletal: Negative.    Skin: Negative.    Allergic/Immunologic: Negative.    Neurological: Negative.    Hematological: Negative.    Psychiatric/Behavioral: Negative.       Objective:     Vital Signs (Most Recent):  Temp: 98.4 °F (36.9 °C) (04/08/25 1214)  Pulse: 70 (04/08/25 1214)  Resp: 16 (04/08/25 1214)  BP: 132/81 (04/08/25 1537)  SpO2: 99 % (04/08/25 1214) Vital Signs (24h Range):  Temp:  [98.4 °F (36.9 °C)] 98.4 °F (36.9 °C)  Pulse:  [70] 70  Resp:  [16] 16  SpO2:  [99 %] 99 %  BP: (132-187)/(81-98) 132/81   Weight: 50.8 kg (112 lb)  Body mass index is 19.84 kg/m².  SpO2: 99 %     No intake or output data in the 24 hours ending 04/08/25 1824  Lines/Drains/Airways       None                 Significant Labs:   Chemistries:   Recent Labs   Lab  04/04/25  1351 04/08/25  1241 04/08/25  1608    143  --    K 3.6 3.2*  --     104  --    CO2 24 27  --    BUN 19.0 20.0  --    CREATININE 1.03* 1.01  --    CALCIUM 9.5 10.4*  --    BILITOT 0.6 0.5  --    ALKPHOS 70 84  --    ALT 12 16  --    AST 9* 13  --    GLUCOSE 99 89  --    MG  --  1.90  --    TROPONINI <0.010 0.026 0.134*        CBC/Anemia Labs: Coags:    Recent Labs   Lab 04/04/25  1351 04/08/25  1241   WBC 12.62* 7.21   HGB 13.2 14.3   HCT 40.1 44.7    446*   MCV 87.6 88.5   RDW 15.3 15.4    Recent Labs   Lab 04/04/25  1351   INR 1.2        Significant Imaging:  Imaging Results              X-Ray Chest AP Portable (Final result)  Result time 04/08/25 14:58:55      Final result by Matt San MD (04/08/25 14:58:55)                   Impression:      1. Borderline cardiomegaly  2. Atelectasis and/or scarring lower lungs  3. Thoracic spondylosis and scoliosis      Electronically signed by: Matt San  Date:    04/08/2025  Time:    14:58               Narrative:    EXAMINATION:  XR CHEST AP PORTABLE    CLINICAL HISTORY:  chest pain;, .    COMPARISON:  04/04/2025    FINDINGS:  An AP view or more reveals the heart to be borderline enlarged.  The trachea is midline.  No definite infiltrate or effusion is seen.  Atelectasis and/or scarring is evident the left lung base.  Degenerative changes and curvature are noted to the thoracic spine.  Bony structures are osteopenic.                                    EKG: NSR, nonspecific T wave changes; largely unchanged from prior    Physical Exam  Constitutional:       Appearance: Normal appearance.   HENT:      Head: Normocephalic and atraumatic.      Nose: Nose normal.      Mouth/Throat:      Mouth: Mucous membranes are dry.      Pharynx: Oropharynx is clear.   Eyes:      Extraocular Movements: Extraocular movements intact.      Pupils: Pupils are equal, round, and reactive to light.   Neck:      Vascular: No carotid bruit.   Cardiovascular:       Rate and Rhythm: Normal rate.      Heart sounds: No murmur heard.     No friction rub. No gallop.   Pulmonary:      Effort: Pulmonary effort is normal. No respiratory distress.      Breath sounds: Normal breath sounds. No stridor. No wheezing, rhonchi or rales.   Chest:      Chest wall: No tenderness.   Abdominal:      General: Abdomen is flat. Bowel sounds are normal.      Palpations: Abdomen is soft.   Musculoskeletal:      Cervical back: Normal range of motion and neck supple. No rigidity or tenderness.   Lymphadenopathy:      Cervical: No cervical adenopathy.   Neurological:      General: No focal deficit present.      Mental Status: She is alert and oriented to person, place, and time. Mental status is at baseline.   Psychiatric:         Mood and Affect: Mood normal.         Behavior: Behavior normal.         Thought Content: Thought content normal.         Judgment: Judgment normal.       Home Medications:   Medications Ordered Prior to Encounter[1]  Current Schedule Inpatient Medications:    Continuous Infusions:    Assessment:     64 year old woman with history of CAD status post recent LAD stent presents with elevated BP and chest pain. Troponin was initially normal, but then amber to 0.134. [facility] films reviewed. LAD stent did prison small diagonal, but no other significant disease.    Plan:     - trend troponin in 4-6 hours after most recent one. If flat, or reduced from prior, and patient remains chest pain free, reasonable to discharge and arrange for follow-up, as symptoms are most likely due to hypertensive urgency/emergency. If troponin continues to rise, would treat for ACS and arrange for possible transfer for [facility].   - continue DAPT  - continue home BP meds    Thank you for your consult.     Prabhu Mcneil MD  Interventional Cardiology/Structural Heart Disease  [facility]        [1]   No current facility-administered medications on file prior to encounter.     Current  Outpatient Medications on File Prior to Encounter   Medication Sig Dispense Refill    albuterol (PROVENTIL/VENTOLIN HFA) 90 mcg/actuation inhaler Inhale 1-2 puffs into the lungs every 6 (six) hours as needed for Wheezing or Shortness of Breath. Rescue 8 g 0    amiodarone (PACERONE) 200 MG Tab Take 2 tablets (400 mg total) by mouth 2 (two) times daily for 2 days, THEN 1 tablet (200 mg total) 2 (two) times daily for 3 days, THEN 1 tablet (200 mg total) once daily. 44 tablet 0    atorvastatin (LIPITOR) 80 MG tablet Take 1 tablet (80 mg total) by mouth once daily. 90 tablet 1    losartan (COZAAR) 25 MG tablet Take 0.5 tablets (12.5 mg total) by mouth once daily. 45 tablet 3    metoprolol succinate (TOPROL-XL) 50 MG 24 hr tablet Take 3 tablets (150 mg total) by mouth once daily. 90 tablet 0    nitroGLYCERIN (NITROSTAT) 0.4 MG SL tablet Place 1 tablet (0.4 mg total) under the tongue every 5 (five) minutes as needed for Chest pain. 10 tablet 1    pantoprazole (PROTONIX) 40 MG tablet Take 1 tablet (40 mg total) by mouth once daily. 30 tablet 0    ticagrelor (BRILINTA) 90 mg tablet Take 1 tablet (90 mg total) by mouth 2 (two) times daily. 60 tablet 11

## 2025-04-09 LAB
OHS QRS DURATION: 78 MS
OHS QTC CALCULATION: 430 MS

## 2025-04-09 NOTE — DISCHARGE INSTRUCTIONS
Take your home medicines as prescribed.  Stay well hydrated.    See your family doctor in one to 2 days for further evaluation, workup, and treatment as necessary.  Call your cardiologist to schedule follow up appointment.  If you experience any new or worsening symptoms return to emergency department.      The exam and treatment you received in Emergency Room was for an urgent problem and NOT INTENDED AS COMPLETE CARE. It is important that you FOLLOW UP with a doctor for ongoing care. If your symptoms become WORSE or you DO NOT IMPROVE and you are unable to reach your health care provider, you should RETURN to the emergency department. The Emergency Room doctor has provided a PRELIMINARY INTERPRETATION of all your STUDIES. A final interpretation may be done after you are discharged. IF A CHANGE in your diagnosis or treatment is needed WE WILL CONTACT YOU. It is critical that we have a CURRENT PHONE NUMBER FOR YOU.

## (undated) DEVICE — VALVE CONTROL COPILOT

## (undated) DEVICE — CONTRAST ISOVUE 370 500ML MULT

## (undated) DEVICE — TIP SUCTION YANKAUER

## (undated) DEVICE — KIT GLIDESHEATH SLEND 6FR 10CM

## (undated) DEVICE — SOL IRRI STRL WATER 1000ML

## (undated) DEVICE — GUIDE LAUNCHER 6FR EBU 3.0

## (undated) DEVICE — GUIDEWIRE PROWATER .014X180CM

## (undated) DEVICE — CATH FL 3.5 5FR

## (undated) DEVICE — Device

## (undated) DEVICE — DEVICE INDEFLATOR BASIX

## (undated) DEVICE — GLOVE PROTEXIS BLUE LATEX 7.5

## (undated) DEVICE — CATH EAGLE EYE PLATINUM

## (undated) DEVICE — CANNULA DUAL CO2/O2 NASAL 7FT

## (undated) DEVICE — CATH JACKY RADIAL 5FR 100CM

## (undated) DEVICE — KIT SURGICAL COLON .25 1.1OZ

## (undated) DEVICE — KIT CANIST SUCTION 1200CC

## (undated) DEVICE — COVER PROBE US 5.5X58L NON LTX

## (undated) DEVICE — BAND TR COMP DEVICE REG 24CM

## (undated) DEVICE — PACK OR CLEAN UP COMBO SIZE 2

## (undated) DEVICE — J WIRE HI TORQUE PILOT 50X014

## (undated) DEVICE — GLOVE 7.5 PROTEXIS PI MICRO

## (undated) DEVICE — KIT MANIFOLD LOW PRESS TUBING

## (undated) DEVICE — KIT HAND CONTROL HIGH PRESSUR

## (undated) DEVICE — PAD DEFIB CADENCE ADULT R2

## (undated) DEVICE — TUBING O2 FEMALE CONN 13FT

## (undated) DEVICE — GUIDEWIRE INQWIRE SE 3MM JTIP

## (undated) DEVICE — COLLECTION SPECIMEN NEPTUNE